# Patient Record
Sex: FEMALE | Race: WHITE | Employment: UNEMPLOYED | URBAN - NONMETROPOLITAN AREA
[De-identification: names, ages, dates, MRNs, and addresses within clinical notes are randomized per-mention and may not be internally consistent; named-entity substitution may affect disease eponyms.]

---

## 2021-12-07 ENCOUNTER — OFFICE VISIT (OUTPATIENT)
Dept: FAMILY MEDICINE CLINIC | Age: 61
End: 2021-12-07
Payer: COMMERCIAL

## 2021-12-07 VITALS
HEART RATE: 66 BPM | HEIGHT: 64 IN | DIASTOLIC BLOOD PRESSURE: 96 MMHG | SYSTOLIC BLOOD PRESSURE: 154 MMHG | OXYGEN SATURATION: 97 % | BODY MASS INDEX: 24.07 KG/M2 | TEMPERATURE: 98.1 F | WEIGHT: 141 LBS

## 2021-12-07 DIAGNOSIS — Z76.89 ENCOUNTER TO ESTABLISH CARE WITH NEW DOCTOR: ICD-10-CM

## 2021-12-07 DIAGNOSIS — R06.2 NOCTURNAL COUGH WITH WHEEZE: ICD-10-CM

## 2021-12-07 DIAGNOSIS — M19.90 ARTHRITIS: ICD-10-CM

## 2021-12-07 DIAGNOSIS — R05.8 NOCTURNAL COUGH WITH WHEEZE: ICD-10-CM

## 2021-12-07 DIAGNOSIS — I10 ESSENTIAL HYPERTENSION: Primary | ICD-10-CM

## 2021-12-07 PROCEDURE — 99204 OFFICE O/P NEW MOD 45 MIN: CPT | Performed by: STUDENT IN AN ORGANIZED HEALTH CARE EDUCATION/TRAINING PROGRAM

## 2021-12-07 RX ORDER — LISINOPRIL 10 MG/1
10 TABLET ORAL DAILY
Qty: 30 TABLET | Refills: 0 | Status: SHIPPED | OUTPATIENT
Start: 2021-12-07 | End: 2021-12-14 | Stop reason: SDUPTHER

## 2021-12-07 RX ORDER — ALBUTEROL SULFATE 90 UG/1
2 AEROSOL, METERED RESPIRATORY (INHALATION) 4 TIMES DAILY PRN
Qty: 18 G | Refills: 0 | Status: SHIPPED | OUTPATIENT
Start: 2021-12-07 | End: 2022-01-31 | Stop reason: SDUPTHER

## 2021-12-07 RX ORDER — MELOXICAM 15 MG/1
15 TABLET ORAL DAILY
Qty: 30 TABLET | Refills: 5 | Status: SHIPPED | OUTPATIENT
Start: 2021-12-07 | End: 2022-01-17

## 2021-12-07 RX ORDER — MELOXICAM 15 MG/1
15 TABLET ORAL DAILY
COMMUNITY
Start: 2021-11-30 | End: 2021-12-07 | Stop reason: SDUPTHER

## 2021-12-07 SDOH — ECONOMIC STABILITY: FOOD INSECURITY: WITHIN THE PAST 12 MONTHS, THE FOOD YOU BOUGHT JUST DIDN'T LAST AND YOU DIDN'T HAVE MONEY TO GET MORE.: NEVER TRUE

## 2021-12-07 SDOH — ECONOMIC STABILITY: FOOD INSECURITY: WITHIN THE PAST 12 MONTHS, YOU WORRIED THAT YOUR FOOD WOULD RUN OUT BEFORE YOU GOT MONEY TO BUY MORE.: NEVER TRUE

## 2021-12-07 ASSESSMENT — ENCOUNTER SYMPTOMS
ABDOMINAL PAIN: 0
SINUS PRESSURE: 0
SHORTNESS OF BREATH: 0
SORE THROAT: 0
COUGH: 0
VOMITING: 0

## 2021-12-07 ASSESSMENT — PATIENT HEALTH QUESTIONNAIRE - PHQ9
SUM OF ALL RESPONSES TO PHQ QUESTIONS 1-9: 0
2. FEELING DOWN, DEPRESSED OR HOPELESS: 0
SUM OF ALL RESPONSES TO PHQ QUESTIONS 1-9: 0
SUM OF ALL RESPONSES TO PHQ QUESTIONS 1-9: 0
SUM OF ALL RESPONSES TO PHQ9 QUESTIONS 1 & 2: 0
1. LITTLE INTEREST OR PLEASURE IN DOING THINGS: 0

## 2021-12-07 ASSESSMENT — SOCIAL DETERMINANTS OF HEALTH (SDOH): HOW HARD IS IT FOR YOU TO PAY FOR THE VERY BASICS LIKE FOOD, HOUSING, MEDICAL CARE, AND HEATING?: NOT HARD AT ALL

## 2021-12-07 NOTE — PATIENT INSTRUCTIONS
Your goal blood pressure is 135/85 or below. Symptoms of low blood pressure include  lightheadedness, weakness, pale color, and nausea. If these occur you should have your blood pressure checked as soon as possible, either using a home blood pressure cuff or have someone bring you to the office to have your blood pressure checked. Weight loss and diet control are an important part of managing high blood pressure. Diet aiming at lowering sodium and cholesterol are very important contributors to blood pressure management. Good blood pressure control is important as it prevent heart attacks and stroke. High Blood Pressure: Care Instructions  Overview     It's normal for blood pressure to go up and down throughout the day. But if it stays up, you have high blood pressure. Another name for high blood pressure is hypertension. Despite what a lot of people think, high blood pressure usually doesn't cause headaches or make you feel dizzy or lightheaded. It usually has no symptoms. But it does increase your risk of stroke, heart attack, and other problems. You and your doctor will talk about your risks of these problems based on your blood pressure. Your doctor will give you a goal for your blood pressure. Your goal will be based on your health and your age. Lifestyle changes, such as eating healthy and being active, are always important to help lower blood pressure. You might also take medicine to reach your blood pressure goal.  Follow-up care is a key part of your treatment and safety. Be sure to make and go to all appointments, and call your doctor if you are having problems. It's also a good idea to know your test results and keep a list of the medicines you take. How can you care for yourself at home? Medical treatment  · If you stop taking your medicine, your blood pressure will go back up. You may take one or more types of medicine to lower your blood pressure. Be safe with medicines.  Take your medicine exactly as prescribed. Call your doctor if you think you are having a problem with your medicine. · Talk to your doctor before you start taking aspirin every day. Aspirin can help certain people lower their risk of a heart attack or stroke. But taking aspirin isn't right for everyone, because it can cause serious bleeding. · See your doctor regularly. You may need to see the doctor more often at first or until your blood pressure comes down. · If you are taking blood pressure medicine, talk to your doctor before you take decongestants or anti-inflammatory medicine, such as ibuprofen. Some of these medicines can raise blood pressure. · Learn how to check your blood pressure at home. Lifestyle changes  · Stay at a healthy weight. This is especially important if you put on weight around the waist. Losing even 10 pounds can help you lower your blood pressure. · If your doctor recommends it, get more exercise. Walking is a good choice. Bit by bit, increase the amount you walk every day. Try for at least 30 minutes on most days of the week. You also may want to swim, bike, or do other activities. · Avoid or limit alcohol. Talk to your doctor about whether you can drink any alcohol. · Try to limit how much sodium you eat to less than 2,300 milligrams (mg) a day. Your doctor may ask you to try to eat less than 1,500 mg a day. · Eat plenty of fruits (such as bananas and oranges), vegetables, legumes, whole grains, and low-fat dairy products. · Lower the amount of saturated fat in your diet. Saturated fat is found in animal products such as milk, cheese, and meat. Limiting these foods may help you lose weight and also lower your risk for heart disease. · Do not smoke. Smoking increases your risk for heart attack and stroke. If you need help quitting, talk to your doctor about stop-smoking programs and medicines. These can increase your chances of quitting for good.     When should you call for help?   Call  911 anytime you think you may need emergency care. This may mean having symptoms that suggest that your blood pressure is causing a serious heart or blood vessel problem. Your blood pressure may be over 180/120. For example, call 911 if:  · You have symptoms of a heart attack. These may include:  ? Chest pain or pressure, or a strange feeling in the chest.  ? Sweating. ? Shortness of breath. ? Nausea or vomiting. ? Pain, pressure, or a strange feeling in the back, neck, jaw, or upper belly or in one or both shoulders or arms. ? Lightheadedness or sudden weakness. ? A fast or irregular heartbeat. · You have symptoms of a stroke. These may include:  ? Sudden numbness, tingling, weakness, or loss of movement in your face, arm, or leg, especially on only one side of your body. ? Sudden vision changes. ? Sudden trouble speaking. ? Sudden confusion or trouble understanding simple statements. ? Sudden problems with walking or balance. ? A sudden, severe headache that is different from past headaches. · You have severe back or belly pain. Do not wait until your blood pressure comes down on its own. Get help right away. Call your doctor now or seek immediate care if:  · Your blood pressure is much higher than normal (such as 180/120 or higher), but you don't have symptoms. · You think high blood pressure is causing symptoms, such as:  ? Severe headache.  ? Blurry vision. Watch closely for changes in your health, and be sure to contact your doctor if:  · Your blood pressure measures higher than your doctor recommends at least 2 times. That means the top number is higher or the bottom number is higher, or both. · You think you may be having side effects from your blood pressure medicine. Where can you learn more? Go to https://pratik.[employer]. org and sign in to your [employer] account.  Enter O888 in the [employer] box to learn more about \"High Blood Pressure: Care Instructions. \"     If you do not have an account, please click on the \"Sign Up Now\" link. Current as of: December 16, 2019               Content Version: 12.5  © 2080-4102 Healthwise, Incorporated. Care instructions adapted under license by [facility] ([facility]). If you have questions about a medical condition or this instruction, always ask your healthcare professional. [address] any warranty or liability for your use of this information. DASH Diet: Care Instructions  Your Care Instructions     The DASH diet is an eating plan that can help lower your blood pressure. DASH stands for Dietary Approaches to Stop Hypertension. Hypertension is high blood pressure. The DASH diet focuses on eating foods that are high in calcium, potassium, and magnesium. These nutrients can lower blood pressure. The foods that are highest in these nutrients are fruits, vegetables, low-fat dairy products, nuts, seeds, and legumes. But taking calcium, potassium, and magnesium supplements instead of eating foods that are high in those nutrients does not have the same effect. The DASH diet also includes whole grains, fish, and poultry. The DASH diet is one of several lifestyle changes your doctor may recommend to lower your high blood pressure. Your doctor may also want you to decrease the amount of sodium in your diet. Lowering sodium while following the DASH diet can lower blood pressure even further than just the DASH diet alone. Follow-up care is a key part of your treatment and safety. Be sure to make and go to all appointments, and call your doctor if you are having problems. It's also a good idea to know your test results and keep a list of the medicines you take. How can you care for yourself at home? Following the DASH diet  · Eat 4 to 5 servings of fruit each day. A serving is 1 medium-sized piece of fruit, ½ cup chopped or canned fruit, 1/4 cup dried fruit, or 4 ounces (½ cup) of fruit juice. Choose fruit more often than fruit juice. · Eat 4 to 5 servings of vegetables each day. A serving is 1 cup of lettuce or raw leafy vegetables, ½ cup of chopped or cooked vegetables, or 4 ounces (½ cup) of vegetable juice. Choose vegetables more often than vegetable juice. · Get 2 to 3 servings of low-fat and fat-free dairy each day. A serving is 8 ounces of milk, 1 cup of yogurt, or 1 ½ ounces of cheese. · Eat 6 to 8 servings of grains each day. A serving is 1 slice of bread, 1 ounce of dry cereal, or ½ cup of cooked rice, pasta, or cooked cereal. Try to choose whole-grain products as much as possible. · Limit lean meat, poultry, and fish to 2 servings each day. A serving is 3 ounces, about the size of a deck of cards. · Eat 4 to 5 servings of nuts, seeds, and legumes (cooked dried beans, lentils, and split peas) each week. A serving is 1/3 cup of nuts, 2 tablespoons of seeds, or ½ cup of cooked beans or peas. · Limit fats and oils to 2 to 3 servings each day. A serving is 1 teaspoon of vegetable oil or 2 tablespoons of salad dressing. · Limit sweets and added sugars to 5 servings or less a week. A serving is 1 tablespoon jelly or jam, ½ cup sorbet, or 1 cup of lemonade. · Eat less than 2,300 milligrams (mg) of sodium a day. If you limit your sodium to 1,500 mg a day, you can lower your blood pressure even more. Tips for success  · Start small. Do not try to make dramatic changes to your diet all at once. You might feel that you are missing out on your favorite foods and then be more likely to not follow the plan. Make small changes, and stick with them. Once those changes become habit, add a few more changes. · Try some of the following:  ? Make it a goal to eat a fruit or vegetable at every meal and at snacks. This will make it easy to get the recommended amount of fruits and vegetables each day. ? Try yogurt topped with fruit and nuts for a snack or healthy dessert.   ? Add lettuce, tomato, cucumber, and onion to sandwiches. ? Combine a ready-made pizza crust with low-fat mozzarella cheese and lots of vegetable toppings. Try using tomatoes, squash, spinach, broccoli, carrots, cauliflower, and onions. ? Have a variety of cut-up vegetables with a low-fat dip as an appetizer instead of chips and dip. ? Sprinkle sunflower seeds or chopped almonds over salads. Or try adding chopped walnuts or almonds to cooked vegetables. ? Try some vegetarian meals using beans and peas. Add garbanzo or kidney beans to salads. Make burritos and tacos with mashed matias beans or black beans. Where can you learn more? Go to https://[employer] Financialjohnnaeb.[facility]. org and sign in to your [employer] account. Enter I340 in the [employer] box to learn more about \"DASH Diet: Care Instructions. \"     If you do not have an account, please click on the \"Sign Up Now\" link. Current as of: December 16, 2019               Content Version: 12.5  © 9700-3032 Healthwise, Incorporated. Care instructions adapted under license by [facility] ([facility]). If you have questions about a medical condition or this instruction, always ask your healthcare professional. [address] any warranty or liability for your use of this information.

## 2021-12-07 NOTE — PROGRESS NOTES
2021    Brdigett Oseguera (:  1960) is a 63 y.o. female, here for evaluation of the following medical concerns:  Chief Complaint   Patient presents with   [address] [place] [address]     Declined mammogram and colonoscopy.  Breathing Problem     Would like inhaler. HPI  Establishing care  Last PCP was several years ago  Past medical history: osteoarthritis-hands knees    OA  Started on Meloxicam 3 months ago  Feels like this has helped her pain a little bit  Pain located in her hands and knees  Previously had steroid injection but had severe migraine for 2 weeks following injection  Was told by Ortho they could do a fusion for her but patient declined    Breathing problems  Exposed to cleaning chemicals 7 years ago  Has had off and on breathing issues since then  Worse in the winter time and with chemical exposure  Endorses cough, wheezing  No prior history of asthma or COPD  Has been given albuterol inhaler in the past which improves her symptoms  Has never had PFTs    Elevated BP reading  No prior history of eHTN  BP has been slowly increasing over time  BP was in the 140's at last provider and she was focusing on diet and exercise  Denied CP, SOB, headaches, vision changes    Health maintenance  Breast cancer: Declined mammogram  Colon cancer: Declined screening  Screening lab work: Patient declined at this time    Review of Systems   Constitutional: Negative for chills and fever. HENT: Negative for congestion, sinus pressure and sore throat. Respiratory: Negative for cough and shortness of breath. Cardiovascular: Negative for chest pain and palpitations. Gastrointestinal: Negative for abdominal pain and vomiting. Musculoskeletal: Positive for arthralgias. Negative for myalgias. Skin: Negative for rash and wound. Neurological: Negative for speech difficulty and light-headedness. Psychiatric/Behavioral: Negative for suicidal ideas.  The patient is not nervous/anxious. Prior to Visit Medications    Medication Sig Taking? Authorizing Provider   meloxicam (MOBIC) 15 MG tablet Take 1 tablet by mouth daily Yes Jayce Baugh DO   albuterol sulfate HFA (VENTOLIN HFA) 108 (90 Base) MCG/ACT inhaler Inhale 2 puffs into the lungs 4 times daily as needed for Wheezing Yes Jayce Baugh DO   lisinopril (PRINIVIL;ZESTRIL) 10 MG tablet Take 1 tablet by mouth daily Yes Jayce Baugh DO        Medications Discontinued During This Encounter   Medication Reason    meloxicam (MOBIC) 15 MG tablet REORDER       Allergies   Allergen Reactions    Penicillins Itching and Rash       Past Medical History:   Diagnosis Date    Arthritis of both knees     Arthritis of right hand        Past Surgical History:   Procedure Laterality Date    BREAST ENHANCEMENT SURGERY       SECTION      PARTIAL HYSTERECTOMY      TONSILLECTOMY      WISDOM TOOTH EXTRACTION         Social History     Socioeconomic History    Marital status: Unknown     Spouse name: Not on file    Number of children: Not on file    Years of education: Not on file    Highest education level: Not on file   Occupational History    Not on file   Tobacco Use    Smoking status: Never Smoker    Smokeless tobacco: Never Used   Substance and Sexual Activity    Alcohol use: Not on file    Drug use: Not on file    Sexual activity: Not on file   Other Topics Concern    Not on file   Social History Narrative    Not on file     Social Determinants of Health     Financial Resource Strain: Low Risk     Difficulty of Paying Living Expenses: Not hard at all   Food Insecurity: No Food Insecurity    Worried About Running Out of Food in the Last Year: Never true    [place] of Food in the Last Year: Never true   Transportation Needs:     Lack of Transportation (Medical): Not on file    Lack of Transportation (Non-Medical):  Not on file   Physical Activity:     Days of Exercise per Week: Not on file    Minutes of Exercise per Session: Not on file   Stress:     Feeling of Stress : Not on file   Social Connections:     Frequency of Communication with Friends and Family: Not on file    Frequency of Social Gatherings with Friends and Family: Not on file    Attends Evangelical Services: Not on file    Active Member of [address] [place] [address] [employer] or Organizations: Not on file    Attends Club or Organization Meetings: Not on file    Marital Status: Not on file   Intimate Partner Violence:     Fear of Current or Ex-Partner: Not on file    Emotionally Abused: Not on file    Physically Abused: Not on file    Sexually Abused: Not on file   Housing Stability:     Unable to Pay for Housing in the Last Year: Not on file    Number of [place] in the Last Year: Not on file    Unstable Housing in the Last Year: Not on file        Family History   Problem Relation Age of Onset    Cancer Mother         lung    High Blood Pressure Mother     High Blood Pressure Father     Diabetes Neg Hx     Kidney Disease Neg Hx     Stroke Neg Hx        Vitals:    12/07/21 1239 12/07/21 1251   BP: (!) 162/94 (!) 154/96   Site: Left Upper Arm Right Upper Arm   Position: Sitting Sitting   Cuff Size: Medium Adult Medium Adult   Pulse: 66    Temp: 98.1 °F (36.7 °C)    SpO2: 97%    Weight: 141 lb (64 kg)    Height: 5' 4\" (1.626 m)        Estimated body mass index is 24.2 kg/m² as calculated from the following:    Height as of this encounter: 5' 4\" (1.626 m). Weight as of this encounter: 141 lb (64 kg). No results for input(s): WBC, RBC, HGB, HCT, MCV, MCH, MCHC, RDW, PLT, MPV in the last 72 hours. No results for input(s): NA, K, CL, CO2, BUN, CREATININE, GLUCOSE, CALCIUM, PROT, LABALBU, BILITOT, ALKPHOS, AST, ALT in the last 72 hours. No results found for: LABA1C    No results found. Physical Exam  Constitutional:       Appearance: Normal appearance. She is normal weight. HENT:      Head: Normocephalic and atraumatic.    Eyes: Extraocular Movements: Extraocular movements intact. Conjunctiva/sclera: Conjunctivae normal.   Cardiovascular:      Rate and Rhythm: Normal rate and regular rhythm. Pulses: Normal pulses. Heart sounds: Normal heart sounds. Pulmonary:      Effort: Pulmonary effort is normal.      Breath sounds: Normal breath sounds. No wheezing or rales. Skin:     General: Skin is warm. Capillary Refill: Capillary refill takes less than 2 seconds. Findings: No rash. Neurological:      Mental Status: She is alert. Psychiatric:         Mood and Affect: Mood normal.         Thought Content: Thought content normal.         Judgment: Judgment normal.         ASSESSMENT/PLAN:  1. Essential hypertension  -Blood pressure is elevated on exam today and per patient elevated on prior visits with her last PCP several years ago  -We will start lisinopril at this time and recheck in 2 weeks at follow-up appointment  -Patient declined screening lab work  -Discussed low-sodium diet and exercise 150 minutes/week    - lisinopril (PRINIVIL;ZESTRIL) 10 MG tablet; Take 1 tablet by mouth daily  Dispense: 30 tablet; Refill: 0    2. Arthritis  -Continue meloxicam  -Patient has [facility] [address] in the past  -Had negative reaction to corticosteroid joint injection  -Was told by Ortho that next option was fusion however patient does not want surgery at this time    - meloxicam (MOBIC) 15 MG tablet; Take 1 tablet by mouth daily  Dispense: 30 tablet; Refill: 5    3. Nocturnal cough with wheeze  -We will get PFTs to rule out underlying asthma or COPD  -Albuterol for symptomatic treatment    - albuterol sulfate HFA (VENTOLIN HFA) 108 (90 Base) MCG/ACT inhaler; Inhale 2 puffs into the lungs 4 times daily as needed for Wheezing  Dispense: 18 g; Refill: 0  - Full PFT Study With Bronchodilator; Future    4.  Encounter to establish care with new doctor  -Declined mammogram, Colonoscopy and screening labs      Medications Discontinued During This Encounter   Medication Reason    meloxicam (MOBIC) 15 MG tablet REORDER       ---------------------------------------------------------------------  Side effects, adverse effects of the medication prescribed today, as well as treatment plan/ rationale and result expectations have been discussed with the patient who expresses understanding and desires to proceed. Close follow up to evaluate treatment results and for coordination of care. I have reviewed the patient's medical history in detail and updated the computerized patient record. As always, patient is advised that if symptoms worsen in any way they will proceed to the nearest emergency room. --------------------------------------------------------------------    Return in about 2 weeks (around 12/21/2021) for f/u BP, new med. An  electronic signature was used to authenticate this note.     --Que Dixon DO on 12/7/2021 at 1:18 PM

## 2021-12-09 ENCOUNTER — TELEPHONE (OUTPATIENT)
Dept: FAMILY MEDICINE CLINIC | Age: 61
End: 2021-12-09

## 2021-12-09 NOTE — TELEPHONE ENCOUNTER
Pt calling because she was charged $ 25 instead of $ 5 on 12/7/21 appt with Dr Wilmer Ibarra.      Pt # [phone]

## 2021-12-14 ENCOUNTER — TELEPHONE (OUTPATIENT)
Dept: FAMILY MEDICINE CLINIC | Age: 61
End: 2021-12-14

## 2021-12-14 ENCOUNTER — VIRTUAL VISIT (OUTPATIENT)
Dept: FAMILY MEDICINE CLINIC | Age: 61
End: 2021-12-14
Payer: COMMERCIAL

## 2021-12-14 DIAGNOSIS — T88.7XXA MEDICATION SIDE EFFECT: Primary | ICD-10-CM

## 2021-12-14 DIAGNOSIS — I10 ESSENTIAL HYPERTENSION: ICD-10-CM

## 2021-12-14 PROCEDURE — 99213 OFFICE O/P EST LOW 20 MIN: CPT

## 2021-12-14 RX ORDER — LISINOPRIL 10 MG/1
10 TABLET ORAL DAILY
Qty: 30 TABLET | Refills: 0 | Status: SHIPPED | OUTPATIENT
Start: 2021-12-14 | End: 2022-01-03 | Stop reason: SINTOL

## 2021-12-14 ASSESSMENT — ENCOUNTER SYMPTOMS
CHEST TIGHTNESS: 0
TROUBLE SWALLOWING: 0
SINUS PAIN: 0
APNEA: 0
ABDOMINAL PAIN: 0
COLOR CHANGE: 0
EYE DISCHARGE: 0
SINUS PRESSURE: 0
DIARRHEA: 0
WHEEZING: 0
FACIAL SWELLING: 0
NAUSEA: 0
EYE ITCHING: 0
VOMITING: 0
SHORTNESS OF BREATH: 0
RHINORRHEA: 0
COUGH: 0
SORE THROAT: 0
EYE PAIN: 0
BACK PAIN: 0

## 2021-12-14 NOTE — PATIENT INSTRUCTIONS
Patient Education        Learning About ACE Inhibitors  What are ACE inhibitors? ACE (angiotensin-converting enzyme) inhibitors are medicines that lower blood pressure. They stop the release of an enzyme. This enzyme makes your blood vessels smaller. Without it, your blood vessels relax and get bigger. This lowers your blood pressure. These medicines also increase how much water and salt go into your urine. This also lowers blood pressure. You may take this kind of medicine if you have high blood pressure. Or you may take it if you have heart problems, kidney problems, or diabetes. If you have coronary artery disease, this medicine can help prevent heart attacks and strokes. Examples  · benazepril (Lotensin)  · enalapril (Vasotec)  · lisinopril (Prinivil, Zestril)  · ramipril (Altace)  This is not a complete list.  Possible side effects  Side effects may include:  · A cough. · Low blood pressure. This can make you feel dizzy or weak. · Too much potassium in your body. · Swelling of your lips, tongue, or face. If the swelling is severe, you may need treatment right away. Severe swelling can make it hard to breathe, but this is rare. You may have other side effects or reactions not listed here. Check the information that comes with your medicine. What to know about taking this medicine  · ACE inhibitors can cause a dry cough. Talk to your doctor if you have a dry cough. You may need a different medicine. · These medicines can cause an allergic reaction. This can cause a little swelling. Or it can cause red bumps on your skin that hurt. In rare cases, the swelling may make it hard for you to breathe. · Do not take this medicine if you are pregnant or plan to become pregnant. · Take your medicines exactly as prescribed. Call your doctor if you think you are having a problem with your medicine. · Check with your doctor or pharmacist before you use any other medicines.  This includes over-the-counter

## 2021-12-14 NOTE — TELEPHONE ENCOUNTER
Called pt to reschedule her 12/23 OV appt for January. She is concerned that her lisinopril will run out before then. She feels like she is having some sinus issues. Pt wants to know if you could send her in a z joanne?

## 2021-12-14 NOTE — PROGRESS NOTES
[address] Encounter  CHIEF COMPLAINT       Chief Complaint   Patient presents with    Sinusitis     evening times, dry cough, lots of pressure around nose, forehead lots of pressure. needs a zpack.  Other     temp 98.4    pulse 79 ox 95   bp 140/100 bp 140/96       HISTORY OF PRESENT ILLNESS   Twin Prescott is a 63 y.o. female who presents with:  HPI  Reporting symptoms of sinus congestion and dizziness she reports the symptoms have been fluctuating on and off. Worse in the evenings since she started taking lisinopril a few weeks ago. REVIEW OF SYSTEMS     Review of Systems   Constitutional: Negative for appetite change, chills, diaphoresis, fatigue and fever. HENT: Positive for congestion. Negative for ear discharge, ear pain, facial swelling, hearing loss, mouth sores, postnasal drip, rhinorrhea, sinus pressure, sinus pain, sore throat and trouble swallowing. Eyes: Negative for pain, discharge and itching. Respiratory: Negative for apnea, cough, chest tightness, shortness of breath and wheezing. Cardiovascular: Negative for chest pain and palpitations. Gastrointestinal: Negative for abdominal pain, diarrhea, nausea and vomiting. Endocrine: Negative for cold intolerance and heat intolerance. Genitourinary: Negative for decreased urine volume and difficulty urinating. Musculoskeletal: Negative for arthralgias, back pain and myalgias. Skin: Negative for color change, pallor and rash. Neurological: Positive for dizziness. Negative for syncope, weakness, light-headedness and headaches. Hematological: Negative for adenopathy. Psychiatric/Behavioral: Negative for behavioral problems, confusion and sleep disturbance.      PAST MEDICAL HISTORY         Diagnosis Date    Arthritis of both knees     Arthritis of right hand      SURGICAL HISTORY     Patient  has a past surgical history that includes partial hysterectomy (cervix not removed);  section; Breast enhancement surgery; Young tooth extraction; and Tonsillectomy. CURRENT MEDICATIONS       Previous Medications    ALBUTEROL SULFATE HFA (VENTOLIN HFA) 108 (90 BASE) MCG/ACT INHALER    Inhale 2 puffs into the lungs 4 times daily as needed for Wheezing    LISINOPRIL (PRINIVIL;ZESTRIL) 10 MG TABLET    Take 1 tablet by mouth daily    MELOXICAM (MOBIC) 15 MG TABLET    Take 1 tablet by mouth daily     ALLERGIES     Patient is is allergic to penicillins. FAMILY HISTORY     Patient'sfamily history includes Cancer in her mother; High Blood Pressure in her father and mother. HISTORY     Patient  reports that she has never smoked. She has never used smokeless tobacco.  PHYSICAL EXAM     VITALS   ,  ,  ,  ,    Physical Exam  Constitutional:       General: She is not in acute distress. Appearance: Normal appearance. She is not ill-appearing, toxic-appearing or diaphoretic. HENT:      Head: Normocephalic. Right Ear: Tympanic membrane, ear canal and external ear normal. No middle ear effusion. There is no impacted cerumen. No mastoid tenderness. Tympanic membrane is not perforated, erythematous or bulging. Left Ear: Tympanic membrane, ear canal and external ear normal.  No middle ear effusion. There is no impacted cerumen. No mastoid tenderness. Tympanic membrane is not perforated, erythematous or bulging. Nose: No congestion or rhinorrhea. Mouth/Throat:      Mouth: Mucous membranes are moist.      Pharynx: Oropharynx is clear. No pharyngeal swelling, oropharyngeal exudate or posterior oropharyngeal erythema. Tonsils: No tonsillar exudate or tonsillar abscesses. 0 on the right. 0 on the left. Eyes:      General:         Right eye: No discharge. Left eye: No discharge. Cardiovascular:      Rate and Rhythm: Normal rate and regular rhythm. Pulses: Normal pulses. Heart sounds: Normal heart sounds. No murmur heard. No gallop.     Pulmonary:      Effort: Pulmonary effort is normal. No respiratory distress. Breath sounds: Normal breath sounds. No stridor. No wheezing, rhonchi or rales. Chest:      Chest wall: No tenderness. Abdominal:      General: Abdomen is flat. There is no distension. Palpations: Abdomen is soft. Tenderness: There is no abdominal tenderness. Musculoskeletal:         General: Normal range of motion. Cervical back: No rigidity or tenderness. Lymphadenopathy:      Cervical: No cervical adenopathy. Skin:     General: Skin is warm and dry. Capillary Refill: Capillary refill takes less than 2 seconds. Coloration: Skin is not pale. Neurological:      General: No focal deficit present. Mental Status: She is alert and oriented to person, place, and time. Mental status is at baseline. Psychiatric:         Mood and Affect: Mood normal.         Behavior: Behavior normal.       READY CARE COURSE   No orders of the defined types were placed in this encounter. Labs:  No results found for this visit on 12/14/21. IMAGING:  No orders to display     Scheduled Meds:  Continuous Infusions:  PRN Meds:. PROCEDURES:  FINAL IMPRESSION    No diagnosis found. DISPOSITION/PLAN     HISTORY OF PRESENT ILLNESS   Priscilla Dia is a 63 y.o. female who presents with ymptoms of sinus congestion and dizziness she reports the symptoms have been fluctuating on and off. Worse in the evenings since she started taking lisinopril a few weeks ago. Pt is afebrile has nontoxic appearance and VS are stable. On exam ears appear bilaterally normal, no bulging of the TM no erythremia. Pharynx is pink and moist, no tonsillar enlargement. Neck is supple, no masses. Lung sounds are clear throughout. Heart sounds normal and regular. No edema noted in the lower extremities. Patient's blood pressure is mildly elevated today. Though consistent or slightly improved from previous visit.   There is low concern for an acute illness at this time advised patient to obtain blood pressure cuff and begin checking blood pressures at home she should do this every other day and record results to bring back in for PCP to review. Patient will be set up with an appointment for PCP prior to leaving today. PATIENT REFERRED TO:  Return As soon as possible, for Follow up with PCP. DISCHARGE MEDICATIONS:  New Prescriptions    No medications on file     Cannot display discharge medications since this is not an admission.        Norma Alpers, SAVANNAH - CNP

## 2021-12-14 NOTE — TELEPHONE ENCOUNTER
I did refill patient's lisinopril    I would recommend she be seen for her sinus issues, can be done virtually

## 2021-12-23 ENCOUNTER — HOSPITAL ENCOUNTER (OUTPATIENT)
Dept: PULMONOLOGY | Age: 61
Discharge: HOME | End: 2021-12-23
Payer: COMMERCIAL

## 2021-12-23 DIAGNOSIS — R06.2 NOCTURNAL COUGH WITH WHEEZE: ICD-10-CM

## 2021-12-23 DIAGNOSIS — R05.8 NOCTURNAL COUGH WITH WHEEZE: ICD-10-CM

## 2021-12-23 PROCEDURE — 94060 EVALUATION OF WHEEZING: CPT

## 2021-12-23 PROCEDURE — 94729 DIFFUSING CAPACITY: CPT

## 2021-12-23 PROCEDURE — 6360000002 HC RX W HCPCS: Performed by: STUDENT IN AN ORGANIZED HEALTH CARE EDUCATION/TRAINING PROGRAM

## 2021-12-23 PROCEDURE — 94726 PLETHYSMOGRAPHY LUNG VOLUMES: CPT

## 2021-12-23 RX ORDER — ALBUTEROL SULFATE 2.5 MG/3ML
2.5 SOLUTION RESPIRATORY (INHALATION) ONCE
Status: COMPLETED | OUTPATIENT
Start: 2021-12-23 | End: 2021-12-23

## 2021-12-23 RX ADMIN — ALBUTEROL SULFATE 2.5 MG: 2.5 SOLUTION RESPIRATORY (INHALATION) at 09:25

## 2021-12-27 NOTE — PROCEDURES
[address]                      [address], [address] [facility]                               PULMONARY FUNCTION    PATIENT NAME: Romulo Babb                    :        1960  MED REC NO:   [id number]                            ROOM:  ACCOUNT NO:   [de-identified]                           ADMIT DATE: 2021  PROVIDER:     Neela Cartwright MD    DATE OF PROCEDURE:  2021    REASON FOR STUDY:  Shortness of breath, cough, and wheezing. INTERPRETATION:  FVC is 1.91, 57% of predicted; FEV1 is 1.41, 54% of  predicted; FEV1/FVC is 73%; and FEF is 25-75% is 1.09, 45% of predicted. Postbronchodilator study shows FVC is 2.18, 14% improvement and FEV1 is  1.60, 13% improvement. Lung volume study shows residual volume is 2.56,  129% of predicted; TLC is 4.32, 85% of predicted; RV to TLC ratio is  59.11, 151% of predicted. Diffusion capacity is 19.65, 90% of  predicted. Airway resistance is 4.23, 227% of predicted. SUMMARY:  Moderately severe obstructive pulmonary disease. There is  significant response to bronchodilator. Static lung volume study  suggests significant air trapping and hyperinflation. Diffusion  capacity within normal range. Airway resistance is increased. Clinical  correlation is requested.         Andrew Clayton MD    D: 2021 9:06:38       T: 2021 10:41:46     [email or web address]  Job#: [id number]     Doc#: [id number]    CC:

## 2021-12-28 PROCEDURE — 94729 DIFFUSING CAPACITY: CPT | Performed by: INTERNAL MEDICINE

## 2021-12-28 PROCEDURE — 94726 PLETHYSMOGRAPHY LUNG VOLUMES: CPT | Performed by: INTERNAL MEDICINE

## 2021-12-28 PROCEDURE — 94060 EVALUATION OF WHEEZING: CPT | Performed by: INTERNAL MEDICINE

## 2022-01-03 ENCOUNTER — OFFICE VISIT (OUTPATIENT)
Dept: FAMILY MEDICINE CLINIC | Age: 62
End: 2022-01-03
Payer: COMMERCIAL

## 2022-01-03 VITALS
HEART RATE: 88 BPM | SYSTOLIC BLOOD PRESSURE: 144 MMHG | OXYGEN SATURATION: 98 % | BODY MASS INDEX: 23.56 KG/M2 | TEMPERATURE: 98.2 F | HEIGHT: 64 IN | DIASTOLIC BLOOD PRESSURE: 90 MMHG | WEIGHT: 138 LBS

## 2022-01-03 DIAGNOSIS — I10 ESSENTIAL HYPERTENSION: ICD-10-CM

## 2022-01-03 DIAGNOSIS — M19.90 ARTHRITIS: ICD-10-CM

## 2022-01-03 DIAGNOSIS — T88.7XXA MEDICATION SIDE EFFECT: ICD-10-CM

## 2022-01-03 DIAGNOSIS — J45.40 MODERATE PERSISTENT ASTHMA WITHOUT COMPLICATION: Primary | ICD-10-CM

## 2022-01-03 PROCEDURE — 99214 OFFICE O/P EST MOD 30 MIN: CPT | Performed by: STUDENT IN AN ORGANIZED HEALTH CARE EDUCATION/TRAINING PROGRAM

## 2022-01-03 RX ORDER — LOSARTAN POTASSIUM 50 MG/1
50 TABLET ORAL DAILY
Qty: 14 TABLET | Refills: 0 | Status: CANCELLED | OUTPATIENT
Start: 2022-01-03 | End: 2022-01-17

## 2022-01-03 RX ORDER — TELMISARTAN 40 MG/1
40 TABLET ORAL DAILY
Qty: 30 TABLET | Refills: 0 | Status: SHIPPED | OUTPATIENT
Start: 2022-01-03 | End: 2022-01-31 | Stop reason: SDUPTHER

## 2022-01-03 RX ORDER — AMLODIPINE BESYLATE 5 MG/1
5 TABLET ORAL DAILY
Qty: 30 TABLET | Refills: 0 | Status: CANCELLED | OUTPATIENT
Start: 2022-01-03

## 2022-01-03 ASSESSMENT — ENCOUNTER SYMPTOMS
SHORTNESS OF BREATH: 0
VOMITING: 0
ABDOMINAL PAIN: 0
WHEEZING: 1
COUGH: 1
SINUS PRESSURE: 0
SORE THROAT: 0

## 2022-01-03 NOTE — PATIENT INSTRUCTIONS
Patient Education        Learning About ARBs  Introduction     ARBs (angiotensin II receptor blockers) block a hormone that makes blood vessels narrow. As a result, the blood vessels relax and widen. This lowers blood pressure. ARBs also put more water and salt into the urine. This also lowers blood pressure. ARBs can treat:  · High blood pressure. · Coronary artery disease. · Heart failure. They also may be used to help your kidneys when you have diabetes. Examples  · candesartan (Atacand)  · irbesartan (Avapro)  · losartan (Cozaar)  · olmesartan (Benicar)  · valsartan (Diovan)  This is not a complete list of all ARBs. Possible side effects  Side effects may include:  · Low blood pressure. You may feel dizzy and weak. · High potassium levels. You may have other side effects or reactions not listed here. Check the information that comes with your medicine. What to know about taking this medicine  · ARBs may be used if you had a cough when you tried to take an ACE inhibitor. ARBs are less likely to cause a cough. · You may need regular blood tests. · Take your medicines exactly as prescribed. Call your doctor if you think you are having a problem with your medicine. · Tell your doctor or pharmacist all the medicines you take. This includes over-the-counter medicines, vitamins, herbal products, and supplements. Taking some medicines together can cause problems. · You should not take ARBs if you are pregnant or planning to become pregnant. Where can you learn more? Go to https://[email or web address]-dispo.com.Ma[employer]. org and sign in to your [facility] account. Enter K212 in the [facility] box to learn more about \"Learning About ARBs. \"     If you do not have an account, please click on the \"Sign Up Now\" link. Current as of: April 29, 2021               Content Version: 13.1  © 6009-6027 Healthwise, Incorporated. Care instructions adapted under license by [facility] ([facility]).  If you have questions about a medical condition or this instruction, always ask your healthcare professional. [place] [address] any warranty or liability for your use of this information.

## 2022-01-03 NOTE — RESULT ENCOUNTER NOTE
Noted, moderate to severe obstructive pulmonary disease with responds to bronchodilator - discussed with pt at apt

## 2022-01-03 NOTE — PROGRESS NOTES
1/3/2022    Treasure Rodriguez (:  1960) is a 63 y.o. female, here for evaluation of the following medical concerns:  Chief Complaint   Patient presents with    Hypertension    Bloated    Discuss Medications     Allergy to lisinopril? HPI  Past medical history: osteoarthritis-hands knees     Breathing problems  Exposed to cleaning chemicals 7 years ago  Has had off and on breathing issues since then  Worse in the winter time and with chemical exposure  Endorses cough, wheezing  No prior history of asthma or COPD  Has been given albuterol inhaler in the past which improves her symptoms    PFTs  showed moderately severe obstructive pulmonary disease. There is  significant response to bronchodilator    Elevated BP reading  No prior history of eHTN  BP has been slowly increasing over time  BP was in the 140's at last provider and she was focusing on diet and exercise  Denied CP, SOB, headaches, vision changes    Started on 10mg lisinopril   Stopped due to lightheadedness, dry cough, bloated  Would like to try Telmisartan    OA  Started on Meloxicam 3 months ago  Feels like this has helped her pain a little bit  Pain located in her hands and knees  Previously had steroid injection but had severe migraine for 2 weeks following injection  Was told by Ortho they could do a fusion for her but patient declined     Continue meloxicam daily    Health maintenance  Breast cancer: Declined mammogram - waiting until next month due to current enlarged LNs under the left arm due to recent COVID vaccine  Colon cancer: Declined screening    Review of Systems   Constitutional: Negative for chills and fever. HENT: Negative for congestion, sinus pressure and sore throat. Respiratory: Positive for cough and wheezing. Negative for shortness of breath. Cardiovascular: Negative for chest pain and palpitations. Gastrointestinal: Negative for abdominal pain and vomiting.    Musculoskeletal: Negative for arthralgias and myalgias. Skin: Negative for rash and wound. Neurological: Negative for speech difficulty and light-headedness. Psychiatric/Behavioral: Negative for suicidal ideas. The patient is not nervous/anxious. Prior to Visit Medications    Medication Sig Taking?  Authorizing Provider   budesonide (PULMICORT FLEXHALER) 180 MCG/ACT AEPB inhaler Inhale 2 puffs into the lungs 2 times daily Yes Saniya Lizzy, DO   telmisartan (MICARDIS) 40 MG tablet Take 1 tablet by mouth daily Yes Saniya Lizzy, DO   meloxicam (MOBIC) 15 MG tablet Take 1 tablet by mouth daily Yes Saniya Lizzy, DO   albuterol sulfate HFA (VENTOLIN HFA) 108 (90 Base) MCG/ACT inhaler Inhale 2 puffs into the lungs 4 times daily as needed for Wheezing Yes Saniya Ball, DO        Medications Discontinued During This Encounter   Medication Reason    lisinopril (PRINIVIL;ZESTRIL) 10 MG tablet Side effects       Allergies   Allergen Reactions    Lisinopril Other (See Comments), Shortness Of Breath and Swelling    Molds & Smuts Shortness Of Breath and Palpitations    Milk-Related Compounds Other (See Comments)    Pollen Extract Itching, Other (See Comments) and Swelling    Penicillins Itching and Rash       Past Medical History:   Diagnosis Date    Arthritis of both knees     Arthritis of right hand     Fibroadenoma of breast, right        Past Surgical History:   Procedure Laterality Date    BREAST ENHANCEMENT SURGERY      BREAST FIBROADENOMA SURGERY       SECTION      PARTIAL HYSTERECTOMY      TONSILLECTOMY      WISDOM TOOTH EXTRACTION         Social History     Socioeconomic History    Marital status:      Spouse name: Not on file    Number of children: Not on file    Years of education: Not on file    Highest education level: Not on file   Occupational History    Not on file   Tobacco Use    Smoking status: Never Smoker    Smokeless tobacco: Never Used   Substance and Sexual Activity    Alcohol use: Not on file    Drug use: Not on file    Sexual activity: Not on file   Other Topics Concern    Not on file   Social History Narrative    Not on file     Social Determinants of Health     Financial Resource Strain: Low Risk     Difficulty of Paying Living Expenses: Not hard at all   Food Insecurity: No Food Insecurity    Worried About Running Out of Food in the Last Year: Never true    [address] in the Last Year: Never true   Transportation Needs:     Lack of Transportation (Medical): Not on file    Lack of Transportation (Non-Medical):  Not on file   Physical Activity:     Days of Exercise per Week: Not on file    Minutes of Exercise per Session: Not on file   Stress:     Feeling of Stress : Not on file   Social Connections:     Frequency of Communication with Friends and Family: Not on file    Frequency of Social Gatherings with Friends and Family: Not on file    Attends Worship Services: Not on file    Active Member of [address] [place] [address] or Organizations: Not on file    Attends Club or Organization Meetings: Not on file    Marital Status: Not on file   Intimate Partner Violence:     Fear of Current or Ex-Partner: Not on file    Emotionally Abused: Not on file    Physically Abused: Not on file    Sexually Abused: Not on file   Housing Stability:     Unable to Pay for Housing in the Last Year: Not on file    Number of [place] in the Last Year: Not on file    Unstable Housing in the Last Year: Not on file        Family History   Problem Relation Age of Onset    Cancer Mother         lung    High Blood Pressure Mother     High Blood Pressure Father     Diabetes Neg Hx     Kidney Disease Neg Hx     Stroke Neg Hx        Vitals:    01/03/22 1037 01/03/22 1046   BP: (!) 136/90 (!) 144/90   Site: Right Upper Arm Left Upper Arm   Position: Sitting Sitting   Cuff Size: Medium Adult Medium Adult   Pulse: 88    Temp: 98.2 °F (36.8 °C)    SpO2: 98%    Weight: 138 lb (62.6 kg)    Height: 5' 4\" (1.626 m)        Estimated body mass index is 23.69 kg/m² as calculated from the following:    Height as of this encounter: 5' 4\" (1.626 m). Weight as of this encounter: 138 lb (62.6 kg). No results for input(s): WBC, RBC, HGB, HCT, MCV, MCH, MCHC, RDW, PLT, MPV in the last 72 hours. No results for input(s): NA, K, CL, CO2, BUN, CREATININE, GLUCOSE, CALCIUM, PROT, LABALBU, BILITOT, ALKPHOS, AST, ALT in the last 72 hours. No results found for: LABA1C    No results found. Physical Exam  Constitutional:       General: She is not in acute distress. Appearance: Normal appearance. HENT:      Head: Normocephalic and atraumatic. Eyes:      Extraocular Movements: Extraocular movements intact. Conjunctiva/sclera: Conjunctivae normal.   Musculoskeletal:         General: No deformity. Normal range of motion. Skin:     Findings: No lesion or rash. Neurological:      General: No focal deficit present. Mental Status: She is alert. Mental status is at baseline. Psychiatric:         Mood and Affect: Mood normal.         Behavior: Behavior normal.         Thought Content: Thought content normal.         ASSESSMENT/PLAN:  1. Moderate persistent asthma without complication  PFTs with moderately severe obstructive pulmonary disease with significant improvement with bronchodilator  We will start Pulmicort and continue albuterol as needed  Referral created for pulmonology    - budesonide (PULMICORT FLEXHALER) 180 MCG/ACT AEPB inhaler; Inhale 2 puffs into the lungs 2 times daily  Dispense: 1 each; Refill: Beatrice Carpio MD, Pulmonology, [facility]    2. Essential hypertension  Patient had side effects from lisinopril including abdominal bloating, lightheadedness and dry cough  She would like to try an ARB at this time specifically telmisartan as she is read online about it  Start telmisartan with follow-up in 2 weeks  Labs as ordered    - telmisartan (MICARDIS) 40 MG tablet;  Take 1 tablet by mouth daily  Dispense: 30 tablet; Refill: 0    3. Arthritis  Continue meloxicam      Medications Discontinued During This Encounter   Medication Reason    lisinopril (PRINIVIL;ZESTRIL) 10 MG tablet Side effects       ---------------------------------------------------------------------  Side effects, adverse effects of the medication prescribed today, as well as treatment plan/ rationale and result expectations have been discussed with the patient who expresses understanding and desires to proceed. Close follow up to evaluate treatment results and for coordination of care. I have reviewed the patient's medical history in detail and updated the computerized patient record. As always, patient is advised that if symptoms worsen in any way they will proceed to the nearest emergency room. --------------------------------------------------------------------    Return in about 2 weeks (around 1/17/2022) for f/u BP - 30 min. An  electronic signature was used to authenticate this note.     --Quinton Rodriguez DO on 1/4/2022 at 2:56 PM

## 2022-01-04 ENCOUNTER — TELEPHONE (OUTPATIENT)
Dept: FAMILY MEDICINE CLINIC | Age: 62
End: 2022-01-04

## 2022-01-04 NOTE — TELEPHONE ENCOUNTER
[facility], Monday-Friday.   8am-430 pm.     [place] Monday-Friday   6a - 5p.  sat 7a - 12p

## 2022-01-04 NOTE — TELEPHONE ENCOUNTER
Pt calling regarding past appointments. Allergy to milk related products, inhaler that was prescribed has milk related products. Pt would like the declining of labs removed from her notes. States it was never brought up or discussed. Also in the notes it was never mentioned about the lymph nodes being enlarged reason why mammogram is not being completed now. Possible inflammation from booster shot. Pt would like mammogram completed at [facility]    Ph, [phone]    Pt will hold off on mammogram. until next appointment. Will order then. Pt states she would like all the blood work that can be done all in one day, she hates blood work.      Will call pt back with  Lab hours and locations    [phone]

## 2022-01-05 DIAGNOSIS — I10 ESSENTIAL HYPERTENSION: ICD-10-CM

## 2022-01-05 LAB
ALBUMIN SERPL-MCNC: 4.5 G/DL (ref 3.5–4.6)
ALP BLD-CCNC: 106 U/L (ref 40–130)
ALT SERPL-CCNC: 36 U/L (ref 0–33)
ANION GAP SERPL CALCULATED.3IONS-SCNC: 12 MEQ/L (ref 9–15)
AST SERPL-CCNC: 27 U/L (ref 0–35)
BILIRUB SERPL-MCNC: 0.4 MG/DL (ref 0.2–0.7)
BUN BLDV-MCNC: 17 MG/DL (ref 6–20)
CALCIUM SERPL-MCNC: 9.7 MG/DL (ref 8.5–9.9)
CHLORIDE BLD-SCNC: 100 MEQ/L (ref 95–107)
CHOLESTEROL, TOTAL: 246 MG/DL (ref 0–199)
CO2: 27 MEQ/L (ref 20–31)
CREAT SERPL-MCNC: 0.65 MG/DL (ref 0.5–0.9)
GFR AFRICAN AMERICAN: >60
GFR NON-AFRICAN AMERICAN: >60
GLOBULIN: 2.9 G/DL (ref 2.3–3.5)
GLUCOSE BLD-MCNC: 84 MG/DL (ref 70–99)
HCT VFR BLD CALC: 43 % (ref 37–47)
HDLC SERPL-MCNC: 75 MG/DL (ref 40–59)
HEMOGLOBIN: 14.3 G/DL (ref 12–16)
LDL CHOLESTEROL CALCULATED: 155 MG/DL (ref 0–129)
MCH RBC QN AUTO: 31.2 PG (ref 27–31.3)
MCHC RBC AUTO-ENTMCNC: 33.2 % (ref 33–37)
MCV RBC AUTO: 93.9 FL (ref 82–100)
PDW BLD-RTO: 14 % (ref 11.5–14.5)
PLATELET # BLD: 271 K/UL (ref 130–400)
POTASSIUM SERPL-SCNC: 4.3 MEQ/L (ref 3.4–4.9)
RBC # BLD: 4.58 M/UL (ref 4.2–5.4)
SODIUM BLD-SCNC: 139 MEQ/L (ref 135–144)
TOTAL PROTEIN: 7.4 G/DL (ref 6.3–8)
TRIGL SERPL-MCNC: 79 MG/DL (ref 0–150)
TSH REFLEX: 2.49 UIU/ML (ref 0.44–3.86)
WBC # BLD: 7.3 K/UL (ref 4.8–10.8)

## 2022-01-17 ENCOUNTER — OFFICE VISIT (OUTPATIENT)
Dept: FAMILY MEDICINE CLINIC | Age: 62
End: 2022-01-17
Payer: COMMERCIAL

## 2022-01-17 VITALS
SYSTOLIC BLOOD PRESSURE: 130 MMHG | HEIGHT: 64 IN | TEMPERATURE: 98.4 F | BODY MASS INDEX: 23.69 KG/M2 | OXYGEN SATURATION: 98 % | DIASTOLIC BLOOD PRESSURE: 70 MMHG | HEART RATE: 87 BPM

## 2022-01-17 DIAGNOSIS — Z12.31 SCREENING MAMMOGRAM FOR BREAST CANCER: ICD-10-CM

## 2022-01-17 DIAGNOSIS — J45.40 MODERATE PERSISTENT ASTHMA WITHOUT COMPLICATION: ICD-10-CM

## 2022-01-17 DIAGNOSIS — M19.90 ARTHRITIS: ICD-10-CM

## 2022-01-17 DIAGNOSIS — S16.1XXA STRAIN OF STERNOCLEIDOMASTOID MUSCLE, INITIAL ENCOUNTER: ICD-10-CM

## 2022-01-17 DIAGNOSIS — I10 ESSENTIAL HYPERTENSION: Primary | ICD-10-CM

## 2022-01-17 DIAGNOSIS — Z80.3 FAMILY HISTORY OF BREAST CANCER: ICD-10-CM

## 2022-01-17 PROCEDURE — 99214 OFFICE O/P EST MOD 30 MIN: CPT | Performed by: STUDENT IN AN ORGANIZED HEALTH CARE EDUCATION/TRAINING PROGRAM

## 2022-01-17 RX ORDER — FLUTICASONE PROPIONATE 44 UG/1
2 AEROSOL, METERED RESPIRATORY (INHALATION) 2 TIMES DAILY
Qty: 1 EACH | Refills: 0 | Status: SHIPPED | OUTPATIENT
Start: 2022-01-17 | End: 2022-04-01 | Stop reason: SDUPTHER

## 2022-01-17 ASSESSMENT — ENCOUNTER SYMPTOMS
VOMITING: 0
COUGH: 1
CONSTIPATION: 0
ABDOMINAL PAIN: 0
ABDOMINAL DISTENTION: 0
DIARRHEA: 0
SHORTNESS OF BREATH: 0
SORE THROAT: 0
SINUS PRESSURE: 0

## 2022-01-17 NOTE — PROGRESS NOTES
2022    Jes Gomez (:  1960) is a 63 y.o. female, here for evaluation of the following medical concerns:  Chief Complaint   Patient presents with    2 Week Follow-Up    Hypertension    Discuss Medications     Inhaler. Pt lactose. Unable to take the pulmicort. Has used flonase i the past so possibly fluticasone inhaler. Pt would also like to discuss meloxicam, has not taken in the last 2 weeks due to side effects with BP medication.  Health Maintenance     Yes for Mammogram. Declined colonscopy at this time.  Cough     Every evening. Productive.       HPI  Past medical history: osteoarthritis-hands knees    Breathing problems  Exposed to cleaning chemicals 7 years ago  Has had off and on breathing issues since then  Worse in the winter time and with chemical exposure  Endorses cough, wheezing  No prior history of asthma or COPD  Has been given albuterol inhaler in the past which improves her symptoms     PFTs  showed moderately severe obstructive pulmonary disease. Nino Spatz is  significant response to bronchodilator  Started on Pulmicort however patient did not  at the pharmacy as she states she has a lactose allergy and there is lactose product in the inhaler  Referred to pulmonology at last apt - scheduled  with Dr. Rishi Dee    Does have intermittent productive cough with clear mucus production  Denies fevers or chills    Elevated BP reading  Initially started on 10mg lisinopril   Stopped due to lightheadedness, dry cough, bloated  Pt requested Telmisartan - started on 40mg daily 1/3  Blood pressure well controlled today and patient is not having any side effects    Neck pain  Left anterior neck  Occurring for a few months  Worse with movement of the head side to side  Has not tried anything for symptoms besides meloxicam which she takes for arthritis    OA  Started on Meloxicam 3 months ago  Feels like this has helped her pain a little bit  Pain located in her hands and knees  Previously had steroid injection but had severe migraine for 2 weeks following injection  Was told by Ortho they could do a fusion for her but patient declined     Stop meloxicam because she was having bloating and GI issues     Health maintenance  Breast cancer: would like ordered  Colon cancer: Declined screening  Basic labs: normal    Review of Systems   Constitutional: Negative for chills and fever. HENT: Negative for congestion, sinus pressure and sore throat. Respiratory: Positive for cough. Negative for shortness of breath. Cardiovascular: Negative for chest pain and palpitations. Gastrointestinal: Negative for abdominal distention, abdominal pain, constipation, diarrhea and vomiting. Musculoskeletal: Positive for neck pain. Negative for arthralgias and myalgias. Skin: Negative for rash and wound. Neurological: Negative for speech difficulty and light-headedness. Psychiatric/Behavioral: Negative for suicidal ideas. The patient is not nervous/anxious. Prior to Visit Medications    Medication Sig Taking?  Authorizing Provider   fluticasone (FLOVENT HFA) 44 MCG/ACT inhaler Inhale 2 puffs into the lungs 2 times daily Yes Jerry Alvarez, DO   telmisartan (MICARDIS) 40 MG tablet Take 1 tablet by mouth daily Yes Jerry Alvarez, DO   albuterol sulfate HFA (VENTOLIN HFA) 108 (90 Base) MCG/ACT inhaler Inhale 2 puffs into the lungs 4 times daily as needed for Wheezing Yes Jerry Alvarez, DO        Medications Discontinued During This Encounter   Medication Reason    budesonide (PULMICORT FLEXHALER) 180 MCG/ACT AEPB inhaler Patient Choice    meloxicam (MOBIC) 15 MG tablet Patient Choice       Allergies   Allergen Reactions    Lisinopril Other (See Comments), Shortness Of Breath and Swelling    Molds & Smuts Shortness Of Breath and Palpitations    Milk-Related Compounds Other (See Comments)    Pollen Extract Itching, Other (See Comments) and Swelling    Penicillins Itching and Rash       Past Medical History:   Diagnosis Date    Arthritis of both knees     Arthritis of right hand     Fibroadenoma of breast, right        Past Surgical History:   Procedure Laterality Date    BREAST ENHANCEMENT SURGERY      BREAST FIBROADENOMA SURGERY       SECTION      PARTIAL HYSTERECTOMY      TONSILLECTOMY      WISDOM TOOTH EXTRACTION         Social History     Socioeconomic History    Marital status:      Spouse name: Not on file    Number of children: Not on file    Years of education: Not on file    Highest education level: Not on file   Occupational History    Not on file   Tobacco Use    Smoking status: Never Smoker    Smokeless tobacco: Never Used   Substance and Sexual Activity    Alcohol use: Not on file    Drug use: Not on file    Sexual activity: Not on file   Other Topics Concern    Not on file   Social History Narrative    Not on file     Social Determinants of Health     Financial Resource Strain: Low Risk     Difficulty of Paying Living Expenses: Not hard at all   Food Insecurity: No Food Insecurity    Worried About Running Out of Food in the Last Year: Never true    Elham of Food in the Last Year: Never true   Transportation Needs:     Lack of Transportation (Medical): Not on file    Lack of Transportation (Non-Medical):  Not on file   Physical Activity:     Days of Exercise per Week: Not on file    Minutes of Exercise per Session: Not on file   Stress:     Feeling of Stress : Not on file   Social Connections:     Frequency of Communication with Friends and Family: Not on file    Frequency of Social Gatherings with Friends and Family: Not on file    Attends [employer] Services: Not on file    Active Member of Clubs or Organizations: Not on file    Attends Club or Organization Meetings: Not on file    Marital Status: Not on file   Intimate Partner Violence:     Fear of Current or Ex-Partner: Not on file    Emotionally Abused: Not on file  Physically Abused: Not on file    Sexually Abused: Not on file   Housing Stability:     Unable to Pay for Housing in the Last Year: Not on file    Number of Places Lived in the Last Year: Not on file    Unstable Housing in the Last Year: Not on file        Family History   Problem Relation Age of Onset    Cancer Mother         lung    High Blood Pressure Mother     High Blood Pressure Father     Diabetes Neg Hx     Kidney Disease Neg Hx     Stroke Neg Hx        Vitals:    01/17/22 1117   BP: 130/70   Pulse: 87   Temp: 98.4 °F (36.9 °C)   SpO2: 98%   Height: 5' 4\" (1.626 m)       Estimated body mass index is 23.69 kg/m² as calculated from the following:    Height as of this encounter: 5' 4\" (1.626 m). Weight as of 1/3/22: 138 lb (62.6 kg). No results for input(s): WBC, RBC, HGB, HCT, MCV, MCH, MCHC, RDW, PLT, MPV in the last 72 hours. No results for input(s): NA, K, CL, CO2, BUN, CREATININE, GLUCOSE, CALCIUM, PROT, LABALBU, BILITOT, ALKPHOS, AST, ALT in the last 72 hours. No results found for: LABA1C    No results found. Physical Exam  Constitutional:       General: She is not in acute distress. Appearance: Normal appearance. HENT:      Head: Normocephalic and atraumatic. Eyes:      Extraocular Movements: Extraocular movements intact. Conjunctiva/sclera: Conjunctivae normal.   Musculoskeletal:         General: Tenderness (Tenderness at the left SCM origin and along the muscle belly) present. No deformity. Normal range of motion. Skin:     Findings: No lesion or rash. Neurological:      General: No focal deficit present. Mental Status: She is alert. Mental status is at baseline. Psychiatric:         Mood and Affect: Mood normal.         Behavior: Behavior normal.         Thought Content: Thought content normal.         ASSESSMENT/PLAN:  1. Essential hypertension  Blood pressure well controlled, continue telmisartan 40 mg daily    2.  Moderate persistent asthma without complication  Follow-up with Dr. Ricardo Greenfield 2/9 as scheduled  Will trial Flovent in the meantime    - fluticasone (FLOVENT HFA) 44 MCG/ACT inhaler; Inhale 2 puffs into the lungs 2 times daily  Dispense: 1 each; Refill: 0    3. Arthritis  Discontinued meloxicam due to GI side effects including bloating and diarrhea    4. Strain of sternocleidomastoid muscle  Neck pain likely musculoskeletal in nature as she has pain with turning her head and there is tenderness to palpation at the insertion of the SCM on exam  Discussed rice therapy    4. Screening mammogram for breast cancer  - [facility] DIGITAL SCREEN W OR WO CAD BILATERAL; Future    5. Family history of breast cancer  Patient would like referral for genetics as she does have a significant family history of breast cancer at a young age with several aunts and cousins diagnosed with breast cancer or GYN cancers    - External Referral to Genetics      Medications Discontinued During This Encounter   Medication Reason    budesonide (PULMICORT FLEXHALER) 180 MCG/ACT AEPB inhaler Patient Choice    meloxicam (MOBIC) 15 MG tablet Patient Choice       ---------------------------------------------------------------------  Side effects, adverse effects of the medication prescribed today, as well as treatment plan/ rationale and result expectations have been discussed with the patient who expresses understanding and desires to proceed. Close follow up to evaluate treatment results and for coordination of care. I have reviewed the patient's medical history in detail and updated the computerized patient record. As always, patient is advised that if symptoms worsen in any way they will proceed to the nearest emergency room. --------------------------------------------------------------------    Return in about 4 weeks (around 2/14/2022) for f/u chronic conditions. An  electronic signature was used to authenticate this note.     --Derek Coley DO on 1/17/2022 at 12:08 PM

## 2022-01-21 ENCOUNTER — TELEPHONE (OUTPATIENT)
Dept: FAMILY MEDICINE CLINIC | Age: 62
End: 2022-01-21

## 2022-01-21 NOTE — TELEPHONE ENCOUNTER
Insurance pharmacy called to say that flovent was denied. Said we need to try flovent or Qvar first.  States they do not have lactose.

## 2022-01-31 DIAGNOSIS — R06.2 NOCTURNAL COUGH WITH WHEEZE: ICD-10-CM

## 2022-01-31 DIAGNOSIS — R05.8 NOCTURNAL COUGH WITH WHEEZE: ICD-10-CM

## 2022-01-31 DIAGNOSIS — I10 ESSENTIAL HYPERTENSION: ICD-10-CM

## 2022-01-31 RX ORDER — ALBUTEROL SULFATE 90 UG/1
2 AEROSOL, METERED RESPIRATORY (INHALATION) 4 TIMES DAILY PRN
Qty: 18 G | Refills: 0 | Status: SHIPPED | OUTPATIENT
Start: 2022-01-31 | End: 2022-03-11 | Stop reason: SDUPTHER

## 2022-01-31 RX ORDER — TELMISARTAN 40 MG/1
40 TABLET ORAL DAILY
Qty: 30 TABLET | Refills: 0 | Status: SHIPPED | OUTPATIENT
Start: 2022-01-31 | End: 2022-02-18 | Stop reason: SDUPTHER

## 2022-01-31 NOTE — TELEPHONE ENCOUNTER
Pt asking for a call when done   [phone]  Dr Deisi Kong pt pt asking for this as soon as possible due to the snow warning

## 2022-02-02 LAB — MAMMOGRAPHY, EXTERNAL: NORMAL

## 2022-02-09 ENCOUNTER — HOSPITAL ENCOUNTER (OUTPATIENT)
Dept: GENERAL RADIOLOGY | Age: 62
Discharge: HOME | End: 2022-02-11
Payer: COMMERCIAL

## 2022-02-09 ENCOUNTER — OFFICE VISIT (OUTPATIENT)
Dept: PULMONOLOGY | Age: 62
End: 2022-02-09
Payer: COMMERCIAL

## 2022-02-09 VITALS
HEIGHT: 64 IN | OXYGEN SATURATION: 99 % | WEIGHT: 128 LBS | BODY MASS INDEX: 21.85 KG/M2 | SYSTOLIC BLOOD PRESSURE: 121 MMHG | DIASTOLIC BLOOD PRESSURE: 85 MMHG | TEMPERATURE: 98.5 F

## 2022-02-09 DIAGNOSIS — R06.02 SHORTNESS OF BREATH: ICD-10-CM

## 2022-02-09 DIAGNOSIS — J44.9 CHRONIC OBSTRUCTIVE PULMONARY DISEASE, UNSPECIFIED COPD TYPE (HCC): ICD-10-CM

## 2022-02-09 DIAGNOSIS — J44.9 CHRONIC OBSTRUCTIVE PULMONARY DISEASE, UNSPECIFIED COPD TYPE (HCC): Primary | ICD-10-CM

## 2022-02-09 PROCEDURE — 99204 OFFICE O/P NEW MOD 45 MIN: CPT | Performed by: INTERNAL MEDICINE

## 2022-02-09 PROCEDURE — 71046 X-RAY EXAM CHEST 2 VIEWS: CPT

## 2022-02-09 ASSESSMENT — ENCOUNTER SYMPTOMS
RHINORRHEA: 0
VOMITING: 0
TROUBLE SWALLOWING: 0
SHORTNESS OF BREATH: 1
VOICE CHANGE: 0
EYE DISCHARGE: 0
ABDOMINAL PAIN: 0
WHEEZING: 1
SORE THROAT: 0
CHEST TIGHTNESS: 0
COUGH: 1
EYE ITCHING: 0
SINUS PRESSURE: 0
DIARRHEA: 0
NAUSEA: 0

## 2022-02-09 NOTE — PROGRESS NOTES
Subjective:     Kian Ryan is a 63 y.o. female who complains today of:     Chief Complaint   Patient presents with    New Patient     asthma       HPI  C/o shortness of breath  with exertion for 10  Years but getting worse in the last 1 year  She has cough , chest tightness when she has goes  from warm to cold weather. He also has c/o  Wheezing more at night and chest tightness. .  She is currently using albuterol HFA as needed basis. 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 No Chest pain with radiation  or pleuritic pain. No Hemoptysis. No  leg edema. No orthopnea. No Fever or chills. No Rhinorrhea and postnasal drip.     Allergies:  Lisinopril, Molds & smuts, Milk-related compounds, Pollen extract, and Penicillins  Past Medical History:   Diagnosis Date    Arthritis of both knees     Arthritis of right hand     Fibroadenoma of breast, right      Past Surgical History:   Procedure Laterality Date    BREAST ENHANCEMENT SURGERY      BREAST FIBROADENOMA SURGERY       SECTION      PARTIAL HYSTERECTOMY      TONSILLECTOMY      WISDOM TOOTH EXTRACTION       Family History   Problem Relation Age of Onset    Cancer Mother         lung    High Blood Pressure Mother     High Blood Pressure Father     Diabetes Neg Hx     Kidney Disease Neg Hx     Stroke Neg Hx      Social History     Socioeconomic History    Marital status:      Spouse name: Not on file    Number of children: Not on file    Years of education: Not on file    Highest education level: Not on file   Occupational History    Not on file   Tobacco Use    Smoking status: Never Smoker    Smokeless tobacco: Never Used   Substance and Sexual Activity    Alcohol use: Not on file    Drug use: Not on file    Sexual activity: Not on file   Other Topics Concern    Not on Negative for difficulty urinating and hematuria. Musculoskeletal: Negative for arthralgias, joint swelling and myalgias. Skin: Negative for rash. Allergic/Immunologic: Negative for environmental allergies and food allergies. Neurological: Negative for dizziness, tremors, weakness and headaches. Psychiatric/Behavioral: Negative for behavioral problems and sleep disturbance.         :     Vitals:    02/09/22 1424   BP: 121/85   Temp: 98.5 °F (36.9 °C)   SpO2: 99%   Weight: 128 lb (58.1 kg)   Height: 5' 4\" (1.626 m)     Wt Readings from Last 3 Encounters:   02/09/22 128 lb (58.1 kg)   01/03/22 138 lb (62.6 kg)   12/07/21 141 lb (64 kg)         Physical Exam  Constitutional:       General: She is not in acute distress. Appearance: She is well-developed. She is not diaphoretic. HENT:      Head: Normocephalic and atraumatic. Nose: Nose normal.   Eyes:      Pupils: Pupils are equal, round, and reactive to light. Neck:      Thyroid: No thyromegaly. Vascular: No JVD. Trachea: No tracheal deviation. Cardiovascular:      Rate and Rhythm: Normal rate and regular rhythm. Heart sounds: No murmur heard. No friction rub. No gallop. Pulmonary:      Effort: No respiratory distress. Breath sounds: No wheezing or rales. Chest:      Chest wall: No tenderness. Abdominal:      General: There is no distension. Tenderness: There is no abdominal tenderness. There is no rebound. Musculoskeletal:         General: Normal range of motion. Lymphadenopathy:      Cervical: No cervical adenopathy. Skin:     General: Skin is warm and dry. Neurological:      Mental Status: She is alert and oriented to person, place, and time.       Coordination: Coordination normal.         Current Outpatient Medications   Medication Sig Dispense Refill    telmisartan (MICARDIS) 40 MG tablet Take 1 tablet by mouth daily 30 tablet 0    albuterol sulfate HFA (VENTOLIN HFA) 108 (90 Base) MCG/ACT inhaler Inhale 2 puffs into the lungs 4 times daily as needed for Wheezing 18 g 0    fluticasone (FLOVENT HFA) 44 MCG/ACT inhaler Inhale 2 puffs into the lungs 2 times daily (Patient not taking: Reported on 2/9/2022) 1 each 0     No current facility-administered medications for this visit. Assessment/Plan:     1. Chronic obstructive pulmonary disease, unspecified COPD type (HCC)  C/o shortness of breath  with exertion for 10  Years but worse in 1 year. She has cough , chest tightness when she has from warm to cold weather. C/o Wheezing more at night. C/o Chest tightness. She has c/o black mold ,exposure 10 years ago , She has been  and in [address], expose to dust.  Spraying chemical in farm. She is using bronchodilator with albuterol HFA prn. Continue albuterol HFA as needed basis  She had a PFT done shows moderately severe obstructive pulmonary disease with significant response to bronchodilator hyperinflation and DLCO is within normal range. I will request chest x-ray. And alpha-1 antitrypsin level. Will give sample of ICS LAMA LABA. - XR CHEST STANDARD (2 VW); Future  - Alpha-1-Antitrypsin; Future    2. Shortness of breath  She having  chronic shortness of breath which is likely due to COPD, Continue  Bronchodilator, as before. keep Spo2 90% or above. Return in about 4 weeks (around 3/9/2022) for shortness of breath, COPD.       Jose Urbina MD

## 2022-02-11 LAB — ALPHA-1 ANTITRYPSIN: 135 MG/DL (ref 90–200)

## 2022-02-18 DIAGNOSIS — I10 ESSENTIAL HYPERTENSION: ICD-10-CM

## 2022-02-18 RX ORDER — TELMISARTAN 40 MG/1
40 TABLET ORAL DAILY
Qty: 30 TABLET | Refills: 0 | Status: SHIPPED | OUTPATIENT
Start: 2022-02-18

## 2022-02-18 NOTE — TELEPHONE ENCOUNTER
Pt asking for refills. Accidentally left prescription out of town. Told patient that message was routed to provider. Patient then started yelling at me using the F word. Stated that she can never get anywhere when she calls here. Proceeded to hang up on me.  RN

## 2022-02-18 NOTE — TELEPHONE ENCOUNTER
Pt  aware that script was sent in. He did say thank you to all of us. He said that he wanted to know why someone in the office said to him that there was nothing we could do since the provider was not in the office. She went hysterical  after hearing that and that's why he had to get involved.

## 2022-02-18 NOTE — TELEPHONE ENCOUNTER
Pt of VN-Pt  calling stating that his wife left in [place] and is needing the micardis refilled today, please advise, spouses phone number is [phone]

## 2022-02-19 ENCOUNTER — TELEPHONE (OUTPATIENT)
Dept: FAMILY MEDICINE CLINIC | Age: 62
End: 2022-02-19

## 2022-02-19 NOTE — TELEPHONE ENCOUNTER
----- Message from [place] sent at 2/19/2022 10:29 AM EST -----  Subject: Message to Provider    QUESTIONS  Information for Provider? PT  would like to speak with the Practice   Manager about the call yesterday and the issue stemming from that. Please   contact him at [phone]. He would like to be contacted to address the   issue because the PT is too upset with the matter. ---------------------------------------------------------------------------  --------------  Marek QUEEN  What is the best way for the office to contact you? OK to leave message on   voicemail  Preferred Call Back Phone Number? [phone]  ---------------------------------------------------------------------------  --------------  SCRIPT ANSWERS  Relationship to Patient? Other  Representative Name? Miller Farley  Is the Representative on the appropriate HIPAA document in Epic?  Yes

## 2022-03-11 ENCOUNTER — OFFICE VISIT (OUTPATIENT)
Dept: PULMONOLOGY | Age: 62
End: 2022-03-11
Payer: COMMERCIAL

## 2022-03-11 VITALS
HEIGHT: 64 IN | HEART RATE: 73 BPM | OXYGEN SATURATION: 98 % | WEIGHT: 135 LBS | SYSTOLIC BLOOD PRESSURE: 123 MMHG | BODY MASS INDEX: 23.05 KG/M2 | DIASTOLIC BLOOD PRESSURE: 86 MMHG

## 2022-03-11 DIAGNOSIS — J44.9 CHRONIC OBSTRUCTIVE PULMONARY DISEASE, UNSPECIFIED COPD TYPE (HCC): Primary | ICD-10-CM

## 2022-03-11 PROCEDURE — 99214 OFFICE O/P EST MOD 30 MIN: CPT | Performed by: INTERNAL MEDICINE

## 2022-03-11 RX ORDER — ALBUTEROL SULFATE 90 UG/1
2 AEROSOL, METERED RESPIRATORY (INHALATION) 4 TIMES DAILY PRN
Qty: 18 G | Refills: 0 | Status: SHIPPED | OUTPATIENT
Start: 2022-03-11 | End: 2022-04-05 | Stop reason: SDUPTHER

## 2022-03-11 ASSESSMENT — ENCOUNTER SYMPTOMS
RHINORRHEA: 0
COUGH: 1
VOICE CHANGE: 0
WHEEZING: 1
VOMITING: 0
CHEST TIGHTNESS: 0
SORE THROAT: 0
SINUS PRESSURE: 0
EYE ITCHING: 0
EYE DISCHARGE: 0
SHORTNESS OF BREATH: 1
ABDOMINAL PAIN: 0
NAUSEA: 0
DIARRHEA: 0
TROUBLE SWALLOWING: 0

## 2022-03-11 NOTE — PROGRESS NOTES
Subjective:     Kyara Gerard is a 63 y.o. female who complains today of:     Chief Complaint   Patient presents with    COPD     CXR result       HPI    She had CXR show COPD , alpha 1 antitrypsin is normal range. PFT show moderate COPD. She said she is feeling better since she is on breztri HFA  2 puff BID . Albuterol HFA prn .  C/o shortness of breath  with exertion. She has cough , chest tightness when she has goes  from warm to cold weather. He also has C/o Wheezing more at night and chest tightness. She is currently using albuterol HFA as needed basis. No Chest pain with radiation  or pleuritic pain. No Hemoptysis. No  leg edema. No orthopnea. No Fever or chills. No Rhinorrhea and postnasal drip.     Allergies:  Lisinopril, Molds & smuts, Milk-related compounds, Pollen extract, and Penicillins  Past Medical History:   Diagnosis Date    Arthritis of both knees     Arthritis of right hand     Fibroadenoma of breast, right      Past Surgical History:   Procedure Laterality Date    BREAST ENHANCEMENT SURGERY      BREAST FIBROADENOMA SURGERY       SECTION      PARTIAL HYSTERECTOMY      TONSILLECTOMY      WISDOM TOOTH EXTRACTION       Family History   Problem Relation Age of Onset    Cancer Mother         lung    High Blood Pressure Mother     High Blood Pressure Father     Diabetes Neg Hx     Kidney Disease Neg Hx     Stroke Neg Hx      Social History     Socioeconomic History    Marital status:      Spouse name: Not on file    Number of children: Not on file    Years of education: Not on file    Highest education level: Not on file   Occupational History    Not on file   Tobacco Use    Smoking status: Never Smoker    Smokeless tobacco: Never Used   Substance and Sexual Activity    Alcohol use: Not on file    Drug use: Not on file    Sexual activity: Not on file   Other Topics Concern    Not on file   Social History Narrative    Not on file Social Determinants of Health     Financial Resource Strain: Low Risk     Difficulty of Paying Living Expenses: Not hard at all   Food Insecurity: No Food Insecurity    Worried About Running Out of Food in the Last Year: Never true    [place] of Food in the Last Year: Never true   Transportation Needs:     Lack of Transportation (Medical): Not on file    Lack of Transportation (Non-Medical): Not on file   Physical Activity:     Days of Exercise per Week: Not on file    Minutes of Exercise per Session: Not on file   Stress:     Feeling of Stress : Not on file   Social Connections:     Frequency of Communication with Friends and Family: Not on file    Frequency of Social Gatherings with Friends and Family: Not on file    Attends Holiness Services: Not on file    Active Member of [address] [place] [address] or Organizations: Not on file    Attends Club or Organization Meetings: Not on file    Marital Status: Not on file   Intimate Partner Violence:     Fear of Current or Ex-Partner: Not on file    Emotionally Abused: Not on file    Physically Abused: Not on file    Sexually Abused: Not on file   Housing Stability:     Unable to Pay for Housing in the Last Year: Not on file    Number of [place] in the Last Year: Not on file    Unstable Housing in the Last Year: Not on file         Review of Systems   Constitutional: Negative for chills, diaphoresis, fatigue and fever. HENT: Negative for congestion, mouth sores, nosebleeds, postnasal drip, rhinorrhea, sinus pressure, sneezing, sore throat, trouble swallowing and voice change. Eyes: Negative for discharge, itching and visual disturbance. Respiratory: Positive for cough, shortness of breath and wheezing. Negative for chest tightness. Cardiovascular: Negative for chest pain, palpitations and leg swelling. Gastrointestinal: Negative for abdominal pain, diarrhea, nausea and vomiting. Genitourinary: Negative for difficulty urinating and hematuria. Musculoskeletal: Negative for arthralgias, joint swelling and myalgias. Skin: Negative for rash. Allergic/Immunologic: Negative for environmental allergies and food allergies. Neurological: Negative for dizziness, tremors, weakness and headaches. Psychiatric/Behavioral: Negative for behavioral problems and sleep disturbance.         :     Vitals:    03/11/22 1129   BP: 123/86   Pulse: 73   SpO2: 98%   Weight: 135 lb (61.2 kg)   Height: 5' 4\" (1.626 m)     Wt Readings from Last 3 Encounters:   03/11/22 135 lb (61.2 kg)   02/09/22 128 lb (58.1 kg)   01/03/22 138 lb (62.6 kg)         Physical Exam  Constitutional:       General: She is not in acute distress. Appearance: She is well-developed. She is not diaphoretic. HENT:      Head: Normocephalic and atraumatic. Nose: Nose normal.   Eyes:      Pupils: Pupils are equal, round, and reactive to light. Neck:      Thyroid: No thyromegaly. Vascular: No JVD. Trachea: No tracheal deviation. Cardiovascular:      Rate and Rhythm: Normal rate and regular rhythm. Heart sounds: No murmur heard. No friction rub. No gallop. Pulmonary:      Effort: No respiratory distress. Breath sounds: No wheezing or rales. Comments: diminished Breath sound bilaterally. Chest:      Chest wall: No tenderness. Abdominal:      General: There is no distension. Tenderness: There is no abdominal tenderness. There is no rebound. Musculoskeletal:         General: Normal range of motion. Lymphadenopathy:      Cervical: No cervical adenopathy. Skin:     General: Skin is warm and dry. Neurological:      Mental Status: She is alert and oriented to person, place, and time.       Coordination: Coordination normal.         Current Outpatient Medications   Medication Sig Dispense Refill    albuterol sulfate HFA (VENTOLIN HFA) 108 (90 Base) MCG/ACT inhaler Inhale 2 puffs into the lungs 4 times daily as needed for Wheezing 18 g 0    Budeson-Glycopyrrol-Formoterol 160-9-4.8 MCG/ACT AERO 2 puff BID 1 each 3    telmisartan (MICARDIS) 40 MG tablet Take 1 tablet by mouth daily 30 tablet 0    fluticasone (FLOVENT HFA) 44 MCG/ACT inhaler Inhale 2 puffs into the lungs 2 times daily 1 each 0     No current facility-administered medications for this visit. Results for orders placed during the hospital encounter of 02/09/22    XR CHEST (2 VW)    Narrative  CHEST X-RAY. TECHNIQUE:  PA and lateral views of the chest.    CLINICAL INDICATION:  61-year-old female presenting with history of COPD and asthma. COMPARISON:  None. PROCEDURE AND FINDINGS:  The cardiomediastinal silhouette is unremarkable. Biapical prominence consistent with the patient's diagnosis of COPD. Increased bilateral lung volumes is seen. The bronchovascular markings are unremarkable bilaterally. The costophrenic angles are clear, no evidence of lung infiltrate, pleural effusion or parenchymal lung mass. Mild levoscoliosis of the thoracic spine with compensatory dextroscoliosis of the thoracolumbar junction. Degenerative bony changes are seen. Bone demineralization seen. Impression  COPD changes, no evidence of acute cardiopulmonary disease is seen. Assessment/Plan:     1. Chronic obstructive pulmonary disease, unspecified COPD type ([address]  She had CXR show COPD , alpha 1 antitrypsin is normal range. PFT show moderate COPD. Raven Brittle Chest x-ray and PFT both result reviewed with patient. She said she is feeling better since she is on breztri HFA  2 puff BID. She is using albuterol HFA prn .  C/o shortness of breath  with exertion. She has cough , chest tightness when she has goes  from warm to cold weather. He also has C/o Wheezing more at night and chest tightness. She is currently using albuterol HFA as needed basis. Continue same    - albuterol sulfate HFA (VENTOLIN HFA) 108 (90 Base) MCG/ACT inhaler;  Inhale 2 puffs into the lungs 4 times daily as needed for Wheezing  Dispense: 18 g; Refill: 0  - Budeson-Glycopyrrol-Formoterol 160-9-4.8 MCG/ACT AERO; 2 puff BID  Dispense: 1 each; Refill: 3     Return in about 4 months (around 7/11/2022) for COPD.       Daily Morrow MD

## 2022-03-18 NOTE — PROGRESS NOTES
Pt states she completed mammogram at [facility], results have not been received yet. Asked patient to reach out and have them sent to us. Pt agreed.

## 2022-03-31 DIAGNOSIS — J44.9 CHRONIC OBSTRUCTIVE PULMONARY DISEASE, UNSPECIFIED COPD TYPE (HCC): Primary | ICD-10-CM

## 2022-03-31 DIAGNOSIS — J45.40 MODERATE PERSISTENT ASTHMA WITHOUT COMPLICATION: ICD-10-CM

## 2022-03-31 NOTE — TELEPHONE ENCOUNTER
Rx requested:  Requested Prescriptions     Pending Prescriptions Disp Refills    Budeson-Glycopyrrol-Formoterol 160-9-4.8 MCG/ACT AERO 1 each 3     Si puff BID    fluticasone (FLOVENT HFA) 44 MCG/ACT inhaler 1 each 0     Sig: Inhale 2 puffs into the lungs 2 times daily       Last Office Visit:   3/11/2022      Next Visit Date:  Future Appointments   Date Time Provider Lily Kolb   2022 11:00 AM Sapna Fox MD [facility]

## 2022-04-01 RX ORDER — FLUTICASONE PROPIONATE 44 UG/1
2 AEROSOL, METERED RESPIRATORY (INHALATION) 2 TIMES DAILY
Qty: 1 EACH | Refills: 0 | Status: SHIPPED | OUTPATIENT
Start: 2022-04-01 | End: 2022-07-14 | Stop reason: ALTCHOICE

## 2022-04-05 DIAGNOSIS — J44.9 CHRONIC OBSTRUCTIVE PULMONARY DISEASE, UNSPECIFIED COPD TYPE (HCC): Primary | ICD-10-CM

## 2022-04-05 RX ORDER — ALBUTEROL SULFATE 90 UG/1
2 AEROSOL, METERED RESPIRATORY (INHALATION) 4 TIMES DAILY PRN
Qty: 18 G | Refills: 0 | Status: SHIPPED | OUTPATIENT
Start: 2022-04-05 | End: 2022-07-14 | Stop reason: SDUPTHER

## 2022-04-05 NOTE — TELEPHONE ENCOUNTER
Rx requested:  Requested Prescriptions     Pending Prescriptions Disp Refills    albuterol sulfate HFA (VENTOLIN HFA) 108 (90 Base) MCG/ACT inhaler 18 g 0     Sig: Inhale 2 puffs into the lungs 4 times daily as needed for Wheezing    Budeson-Glycopyrrol-Formoterol 160-9-4.8 MCG/ACT AERO 1 each 3     Si puff BID       Last Office Visit:   3/11/2022      Next Visit Date:  Future Appointments   Date Time Provider Lily Kolb   2022 11:00 AM Corry Hernández MD [address] [place] [address]

## 2022-04-06 ENCOUNTER — TELEPHONE (OUTPATIENT)
Dept: PULMONOLOGY | Age: 62
End: 2022-04-06

## 2022-05-09 ENCOUNTER — TELEPHONE (OUTPATIENT)
Dept: GASTROENTEROLOGY | Age: 62
End: 2022-05-09

## 2022-05-09 NOTE — TELEPHONE ENCOUNTER
Spoke to patient , She stated that she will never come back to [facility].  She asked not to be contacted again

## 2022-07-14 ENCOUNTER — OFFICE VISIT (OUTPATIENT)
Dept: PULMONOLOGY | Age: 62
End: 2022-07-14
Payer: COMMERCIAL

## 2022-07-14 VITALS
RESPIRATION RATE: 18 BRPM | TEMPERATURE: 97.3 F | WEIGHT: 136 LBS | OXYGEN SATURATION: 98 % | BODY MASS INDEX: 23.22 KG/M2 | HEART RATE: 71 BPM | HEIGHT: 64 IN | SYSTOLIC BLOOD PRESSURE: 122 MMHG | DIASTOLIC BLOOD PRESSURE: 84 MMHG

## 2022-07-14 DIAGNOSIS — J45.40 MODERATE PERSISTENT ASTHMA WITHOUT COMPLICATION: Primary | ICD-10-CM

## 2022-07-14 DIAGNOSIS — J44.9 CHRONIC OBSTRUCTIVE PULMONARY DISEASE, UNSPECIFIED COPD TYPE (HCC): ICD-10-CM

## 2022-07-14 PROCEDURE — 99214 OFFICE O/P EST MOD 30 MIN: CPT | Performed by: INTERNAL MEDICINE

## 2022-07-14 RX ORDER — ALBUTEROL SULFATE 90 UG/1
2 AEROSOL, METERED RESPIRATORY (INHALATION) 4 TIMES DAILY PRN
Qty: 18 G | Refills: 0 | Status: CANCELLED | OUTPATIENT
Start: 2022-07-14 | End: 2022-08-13

## 2022-07-14 RX ORDER — ALBUTEROL SULFATE 90 UG/1
2 AEROSOL, METERED RESPIRATORY (INHALATION) 4 TIMES DAILY PRN
Qty: 3 EACH | Refills: 1 | Status: SHIPPED | OUTPATIENT
Start: 2022-07-14 | End: 2022-08-28

## 2022-07-14 ASSESSMENT — ENCOUNTER SYMPTOMS
EYE ITCHING: 0
WHEEZING: 0
SHORTNESS OF BREATH: 1
COUGH: 1
SORE THROAT: 0
EYE DISCHARGE: 0
VOICE CHANGE: 0
NAUSEA: 0
RHINORRHEA: 0
SINUS PRESSURE: 0
VOMITING: 0
TROUBLE SWALLOWING: 0
ABDOMINAL PAIN: 0
CHEST TIGHTNESS: 0
DIARRHEA: 0

## 2022-07-14 NOTE — PROGRESS NOTES
Subjective:     Deya Connolly is a 63 y.o. female who complains today of:     Chief Complaint   Patient presents with    Follow-up     patient is following up on COPD. HPI  She had CXR show COPD , alpha 1 antitrypsin is normal range. PFT show moderate COPD. She is on breztri HFA  2 puff BID . Albuterol HFA prn .  C/o shortness of breath  with exertion. She has cough , chest tightness when she has goes  from warm to cold weather. No wheezing since using breztri  She is currently using albuterol HFA as needed basis. No Chest pain with radiation  or pleuritic pain. No Hemoptysis. No  leg edema. No orthopnea. No Fever or chills. No Rhinorrhea and postnasal drip.        Allergies:  Lisinopril, Molds & smuts, Milk-related compounds, Pollen extract, and Penicillins  Past Medical History:   Diagnosis Date    Arthritis of both knees     Arthritis of right hand     Fibroadenoma of breast, right      Past Surgical History:   Procedure Laterality Date    BREAST ENHANCEMENT SURGERY      BREAST FIBROADENOMA SURGERY       SECTION      PARTIAL HYSTERECTOMY (CERVIX NOT REMOVED)      TONSILLECTOMY      WISDOM TOOTH EXTRACTION       Family History   Problem Relation Age of Onset    Cancer Mother         lung    High Blood Pressure Mother     High Blood Pressure Father     Diabetes Neg Hx     Kidney Disease Neg Hx     Stroke Neg Hx      Social History     Socioeconomic History    Marital status:      Spouse name: Not on file    Number of children: Not on file    Years of education: Not on file    Highest education level: Not on file   Occupational History    Not on file   Tobacco Use    Smoking status: Never Smoker    Smokeless tobacco: Never Used   Substance and Sexual Activity    Alcohol use: Not on file    Drug use: Not on file    Sexual activity: Not on file   Other Topics Concern    Not on file   Social History Narrative    Not on file     Social Determinants of Health     Financial Resource Strain: Low Risk     Difficulty of Paying Living Expenses: Not hard at all   Food Insecurity: No Food Insecurity    Worried About [facility] in the Last Year: Never true    Ran Out of Food in the Last Year: Never true   Transportation Needs:     Lack of Transportation (Medical): Not on file    Lack of Transportation (Non-Medical): Not on file   Physical Activity:     Days of Exercise per Week: Not on file    Minutes of Exercise per Session: Not on file   Stress:     Feeling of Stress : Not on file   Social Connections:     Frequency of Communication with Friends and Family: Not on file    Frequency of Social Gatherings with Friends and Family: Not on file    Attends Jain Services: Not on file    Active Member of Clubs or Organizations: Not on file    Attends Club or Organization Meetings: Not on file    Marital Status: Not on file   Intimate Partner Violence:     Fear of Current or Ex-Partner: Not on file    Emotionally Abused: Not on file    Physically Abused: Not on file    Sexually Abused: Not on file   Housing Stability:     Unable to Pay for Housing in the Last Year: Not on file    Number of [place] in the Last Year: Not on file    Unstable Housing in the Last Year: Not on file         Review of Systems   Constitutional: Negative for chills, diaphoresis, fatigue and fever. HENT: Negative for congestion, mouth sores, nosebleeds, postnasal drip, rhinorrhea, sinus pressure, sneezing, sore throat, trouble swallowing and voice change. Eyes: Negative for discharge, itching and visual disturbance. Respiratory: Positive for cough and shortness of breath. Negative for chest tightness and wheezing. Cardiovascular: Negative for chest pain, palpitations and leg swelling. Gastrointestinal: Negative for abdominal pain, diarrhea, nausea and vomiting. Genitourinary: Negative for difficulty urinating and hematuria.    Musculoskeletal: Negative for arthralgias, joint swelling and myalgias. Skin: Negative for rash. Allergic/Immunologic: Negative for environmental allergies and food allergies. Neurological: Negative for dizziness, tremors, weakness and headaches. Psychiatric/Behavioral: Negative for behavioral problems and sleep disturbance.         :     Vitals:    07/14/22 1109   BP: 122/84   Site: Left Upper Arm   Position: Sitting   Cuff Size: Small Adult   Pulse: 71   Resp: 18   Temp: 97.3 °F (36.3 °C)   TempSrc: Temporal   SpO2: 98%   Weight: 136 lb (61.7 kg)   Height: 5' 4\" (1.626 m)     Wt Readings from Last 3 Encounters:   07/14/22 136 lb (61.7 kg)   03/11/22 135 lb (61.2 kg)   02/09/22 128 lb (58.1 kg)         Physical Exam  Constitutional:       General: She is not in acute distress. Appearance: She is well-developed. She is not diaphoretic. HENT:      Head: Normocephalic and atraumatic. Nose: Nose normal.   Eyes:      Pupils: Pupils are equal, round, and reactive to light. Neck:      Thyroid: No thyromegaly. Vascular: No JVD. Trachea: No tracheal deviation. Cardiovascular:      Rate and Rhythm: Normal rate and regular rhythm. Heart sounds: No murmur heard. No friction rub. No gallop. Pulmonary:      Effort: No respiratory distress. Breath sounds: No wheezing or rales. Comments: diminished Breath sound bilaterally. Chest:      Chest wall: No tenderness. Abdominal:      General: There is no distension. Tenderness: There is no abdominal tenderness. There is no rebound. Musculoskeletal:         General: Normal range of motion. Lymphadenopathy:      Cervical: No cervical adenopathy. Skin:     General: Skin is warm and dry. Neurological:      Mental Status: She is alert and oriented to person, place, and time.       Coordination: Coordination normal.   Psychiatric:         Mood and Affect: Mood normal.         Behavior: Behavior normal.       Current Outpatient Medications   Medication Sig Dispense Refill    albuterol sulfate HFA (VENTOLIN HFA) 108 (90 Base) MCG/ACT inhaler Inhale 2 puffs into the lungs 4 times daily as needed for Wheezing 3 each 1    Budeson-Glycopyrrol-Formoterol 160-9-4.8 MCG/ACT AERO Inhale 2 puffs into the lungs in the morning and at bedtime 2 puff twice a day. 3 each 1    telmisartan (MICARDIS) 40 MG tablet Take 1 tablet by mouth daily 30 tablet 0     No current facility-administered medications for this visit. Results for orders placed during the hospital encounter of 02/09/22    XR CHEST (2 VW)    Narrative  CHEST X-RAY. TECHNIQUE:  PA and lateral views of the chest.    CLINICAL INDICATION:  79-year-old female presenting with history of COPD and asthma. COMPARISON:  None. PROCEDURE AND FINDINGS:  The cardiomediastinal silhouette is unremarkable. Biapical prominence consistent with the patient's diagnosis of COPD. Increased bilateral lung volumes is seen. The bronchovascular markings are unremarkable bilaterally. The costophrenic angles are clear, no evidence of lung infiltrate, pleural effusion or parenchymal lung mass. Mild levoscoliosis of the thoracic spine with compensatory dextroscoliosis of the thoracolumbar junction. Degenerative bony changes are seen. Bone demineralization seen. Impression  COPD changes, no evidence of acute cardiopulmonary disease is seen. Assessment/Plan:     1. Moderate persistent asthma without complication  She had CXR show COPD , alpha 1 antitrypsin is normal range. PFT show moderate COPD. She is on breztri HFA  2 puff BID . Albuterol HFA prn .  C/o shortness of breath  with exertion. She has cough , chest tightness when she has goes  from warm to cold weather. No wheezing since using breztri HFA. She is currently using albuterol HFA as needed basis. 2. Chronic obstructive pulmonary disease, unspecified COPD type ([address]  Continue bronchodilator therapy as before.     - albuterol sulfate HFA (VENTOLIN HFA) 108 (90 Base) MCG/ACT inhaler; Inhale 2 puffs into the lungs 4 times daily as needed for Wheezing  Dispense: 3 each; Refill: 1  - Budeson-Glycopyrrol-Formoterol 160-9-4.8 MCG/ACT AERO; Inhale 2 puffs into the lungs in the morning and at bedtime 2 puff twice a day. Dispense: 3 each; Refill: 1      Return in about 4 months (around 11/14/2022) for asthma, COPD.       Mary Camarena MD

## 2022-08-09 DIAGNOSIS — J44.9 CHRONIC OBSTRUCTIVE PULMONARY DISEASE, UNSPECIFIED COPD TYPE (HCC): ICD-10-CM

## 2022-08-09 NOTE — TELEPHONE ENCOUNTER
Please approve or deny this request.    Rx requested:  Requested Prescriptions     Pending Prescriptions Disp Refills    Budeson-Glycopyrrol-Formoterol 160-9-4.8 MCG/ACT AERO 3 each 1     Sig: Inhale 2 puffs into the lungs in the morning and at bedtime 2 puff twice a day.          Last Office Visit:   7/14/2022      Next Visit Date:  Future Appointments   Date Time Provider Lily Kolb   11/15/2022  9:45 AM Arnaud Zambrano MD [address] [place] [address]

## 2022-11-15 ENCOUNTER — OFFICE VISIT (OUTPATIENT)
Dept: PULMONOLOGY | Age: 62
End: 2022-11-15
Payer: COMMERCIAL

## 2022-11-15 VITALS
OXYGEN SATURATION: 98 % | WEIGHT: 135 LBS | BODY MASS INDEX: 23.17 KG/M2 | SYSTOLIC BLOOD PRESSURE: 118 MMHG | HEART RATE: 76 BPM | DIASTOLIC BLOOD PRESSURE: 80 MMHG

## 2022-11-15 DIAGNOSIS — J44.9 CHRONIC OBSTRUCTIVE PULMONARY DISEASE, UNSPECIFIED COPD TYPE (HCC): ICD-10-CM

## 2022-11-15 DIAGNOSIS — J45.40 MODERATE PERSISTENT ASTHMA WITHOUT COMPLICATION: Primary | ICD-10-CM

## 2022-11-15 PROCEDURE — 3078F DIAST BP <80 MM HG: CPT | Performed by: INTERNAL MEDICINE

## 2022-11-15 PROCEDURE — 99214 OFFICE O/P EST MOD 30 MIN: CPT | Performed by: INTERNAL MEDICINE

## 2022-11-15 PROCEDURE — 3074F SYST BP LT 130 MM HG: CPT | Performed by: INTERNAL MEDICINE

## 2022-11-15 RX ORDER — ALBUTEROL SULFATE 90 UG/1
2 AEROSOL, METERED RESPIRATORY (INHALATION) 4 TIMES DAILY PRN
Qty: 3 EACH | Refills: 1 | Status: SHIPPED | OUTPATIENT
Start: 2022-11-15 | End: 2022-12-30

## 2022-11-15 RX ORDER — OMEPRAZOLE 20 MG/1
CAPSULE, DELAYED RELEASE ORAL
COMMUNITY
Start: 2022-10-14

## 2022-11-15 ASSESSMENT — ENCOUNTER SYMPTOMS
RHINORRHEA: 0
NAUSEA: 0
SHORTNESS OF BREATH: 1
EYE DISCHARGE: 0
EYE ITCHING: 0
WHEEZING: 0
VOICE CHANGE: 0
COUGH: 0
TROUBLE SWALLOWING: 0
VOMITING: 0
DIARRHEA: 0
SORE THROAT: 0
ABDOMINAL PAIN: 0
CHEST TIGHTNESS: 0
SINUS PRESSURE: 0

## 2022-11-15 NOTE — PROGRESS NOTES
Subjective:     Matt Harmon is a 63 y.o. female who complains today of:     Chief Complaint   Patient presents with    Follow-up     4m f/u     COPD       HPI  She is on breztri HFA  2 puff BID . Albuterol HFA prn .  C/o shortness of breath  with exertion. She has no cough or chest tightness   No wheezing since using Breztri HFA   She is currently using albuterol HFA as needed basis. No Chest pain with radiation  or pleuritic pain. No Hemoptysis. No  leg edema. No orthopnea. No Fever or chills. No Rhinorrhea and postnasal drip. Alpha 1 antitrypsin is normal range. She had CXR show COPD , PFT show moderate COPD.        Allergies:  Lisinopril, Molds & smuts, Milk-related compounds, Pollen extract, and Penicillins  Past Medical History:   Diagnosis Date    Arthritis of both knees     Arthritis of right hand     Fibroadenoma of breast, right      Past Surgical History:   Procedure Laterality Date    BREAST ENHANCEMENT SURGERY      BREAST FIBROADENOMA SURGERY       SECTION      PARTIAL HYSTERECTOMY (CERVIX NOT REMOVED)      TONSILLECTOMY      WISDOM TOOTH EXTRACTION       Family History   Problem Relation Age of Onset    Cancer Mother         lung    High Blood Pressure Mother     High Blood Pressure Father     Diabetes Neg Hx     Kidney Disease Neg Hx     Stroke Neg Hx      Social History     Socioeconomic History    Marital status:      Spouse name: Not on file    Number of children: Not on file    Years of education: Not on file    Highest education level: Not on file   Occupational History    Not on file   Tobacco Use    Smoking status: Never    Smokeless tobacco: Never   Substance and Sexual Activity    Alcohol use: Not on file    Drug use: Not on file    Sexual activity: Not on file   Other Topics Concern    Not on file   Social History Narrative    Not on file     Social Determinants of Health     Financial Resource Strain: Low Risk     Difficulty of Paying Living Expenses: Not hard at all   Food Insecurity: No Food Insecurity    Worried About Running Out of Food in the Last Year: Never true    Ran Out of Food in the Last Year: Never true   Transportation Needs: Not on file   Physical Activity: Not on file   Stress: Not on file   Social Connections: Not on file   Intimate Partner Violence: Not on file   Housing Stability: Not on file         Review of Systems   Constitutional:  Negative for chills, diaphoresis, fatigue and fever. HENT:  Negative for congestion, mouth sores, nosebleeds, postnasal drip, rhinorrhea, sinus pressure, sneezing, sore throat, trouble swallowing and voice change. Eyes:  Negative for discharge, itching and visual disturbance. Respiratory:  Positive for shortness of breath. Negative for cough, chest tightness and wheezing. Cardiovascular:  Negative for chest pain, palpitations and leg swelling. Gastrointestinal:  Negative for abdominal pain, diarrhea, nausea and vomiting. Genitourinary:  Negative for difficulty urinating and hematuria. Musculoskeletal:  Negative for arthralgias, joint swelling and myalgias. Skin:  Negative for rash. Allergic/Immunologic: Negative for environmental allergies and food allergies. Neurological:  Negative for dizziness, tremors, weakness and headaches. Psychiatric/Behavioral:  Negative for behavioral problems and sleep disturbance.        :     Vitals:    11/15/22 0947   BP: 118/80   Site: Right Upper Arm   Position: Sitting   Cuff Size: Medium Adult   Pulse: 76   SpO2: 98%   Weight: 135 lb (61.2 kg)     Wt Readings from Last 3 Encounters:   11/15/22 135 lb (61.2 kg)   07/14/22 136 lb (61.7 kg)   03/11/22 135 lb (61.2 kg)         Physical Exam  Constitutional:       General: She is not in acute distress. Appearance: She is well-developed. She is not diaphoretic. HENT:      Head: Normocephalic and atraumatic. Nose: Nose normal.   Eyes:      Pupils: Pupils are equal, round, and reactive to light.    Neck: Thyroid: No thyromegaly. Vascular: No JVD. Trachea: No tracheal deviation. Cardiovascular:      Rate and Rhythm: Normal rate and regular rhythm. Heart sounds: No murmur heard. No friction rub. No gallop. Pulmonary:      Effort: No respiratory distress. Breath sounds: No wheezing or rales. Comments: diminished Breath sound bilaterally. Chest:      Chest wall: No tenderness. Abdominal:      General: There is no distension. Tenderness: There is no abdominal tenderness. There is no rebound. Musculoskeletal:         General: Normal range of motion. Lymphadenopathy:      Cervical: No cervical adenopathy. Skin:     General: Skin is warm and dry. Neurological:      Mental Status: She is alert and oriented to person, place, and time. Coordination: Coordination normal.   Psychiatric:         Mood and Affect: Mood normal.         Behavior: Behavior normal.       Current Outpatient Medications   Medication Sig Dispense Refill    Budeson-Glycopyrrol-Formoterol 160-9-4.8 MCG/ACT AERO Inhale 2 puffs into the lungs in the morning and at bedtime 2 puff twice a day. 3 each 1    albuterol sulfate HFA (VENTOLIN HFA) 108 (90 Base) MCG/ACT inhaler Inhale 2 puffs into the lungs 4 times daily as needed for Wheezing 3 each 1    telmisartan (MICARDIS) 40 MG tablet Take 1 tablet by mouth daily 30 tablet 0    omeprazole (PRILOSEC) 20 MG delayed release capsule TAKE 1 CAPSULE BY MOUTH DAILY       No current facility-administered medications for this visit. Results for orders placed during the hospital encounter of 02/09/22    XR CHEST (2 VW)    Narrative  CHEST X-RAY. TECHNIQUE:  PA and lateral views of the chest.    CLINICAL INDICATION:  51-year-old female presenting with history of COPD and asthma. COMPARISON:  None. PROCEDURE AND FINDINGS:  The cardiomediastinal silhouette is unremarkable. Biapical prominence consistent with the patient's diagnosis of COPD. Increased bilateral lung volumes is seen. The bronchovascular markings are unremarkable bilaterally. The costophrenic angles are clear, no evidence of lung infiltrate, pleural effusion or parenchymal lung mass. Mild levoscoliosis of the thoracic spine with compensatory dextroscoliosis of the thoracolumbar junction. Degenerative bony changes are seen. Bone demineralization seen. Impression  COPD changes, no evidence of acute cardiopulmonary disease is seen. Assessment/Plan:     1. Moderate persistent asthma without complication  She is on breztri HFA  2 puff BID . Albuterol HFA prn .C/o shortness of breath  with exertion. She has no cough or chest tightness No wheezing since using Breztri HFA She is currently using albuterol HFA as needed basis. - Budeson-Glycopyrrol-Formoterol 160-9-4.8 MCG/ACT AERO; Inhale 2 puffs into the lungs in the morning and at bedtime 2 puff twice a day. Dispense: 3 each; Refill: 1  - albuterol sulfate HFA (VENTOLIN HFA) 108 (90 Base) MCG/ACT inhaler; Inhale 2 puffs into the lungs 4 times daily as needed for Wheezing  Dispense: 3 each; Refill: 1    2. Chronic obstructive pulmonary disease, unspecified COPD type ([address]  CXR and PFT before next visit , continue bronchodilator as before. Return in about 3 months (around 2/15/2023) for CXR result, pft result, asthma, COPD.       Sofía Guan MD

## 2022-12-29 ENCOUNTER — HOSPITAL ENCOUNTER (OUTPATIENT)
Dept: GENERAL RADIOLOGY | Age: 62
Discharge: HOME | End: 2022-12-31
Payer: COMMERCIAL

## 2022-12-29 ENCOUNTER — HOSPITAL ENCOUNTER (OUTPATIENT)
Dept: PULMONOLOGY | Age: 62
Discharge: HOME | End: 2022-12-29
Payer: COMMERCIAL

## 2022-12-29 DIAGNOSIS — J45.40 MODERATE PERSISTENT ASTHMA WITHOUT COMPLICATION: ICD-10-CM

## 2022-12-29 DIAGNOSIS — J44.9 CHRONIC OBSTRUCTIVE PULMONARY DISEASE, UNSPECIFIED COPD TYPE (HCC): ICD-10-CM

## 2022-12-29 PROCEDURE — 94060 EVALUATION OF WHEEZING: CPT

## 2022-12-29 PROCEDURE — 6360000002 HC RX W HCPCS: Performed by: INTERNAL MEDICINE

## 2022-12-29 PROCEDURE — 94726 PLETHYSMOGRAPHY LUNG VOLUMES: CPT

## 2022-12-29 PROCEDURE — 94729 DIFFUSING CAPACITY: CPT

## 2022-12-29 PROCEDURE — 71046 X-RAY EXAM CHEST 2 VIEWS: CPT

## 2022-12-29 RX ORDER — ALBUTEROL SULFATE 2.5 MG/3ML
2.5 SOLUTION RESPIRATORY (INHALATION) ONCE
Status: COMPLETED | OUTPATIENT
Start: 2022-12-29 | End: 2022-12-29

## 2022-12-29 RX ADMIN — ALBUTEROL SULFATE 2.5 MG: 2.5 SOLUTION RESPIRATORY (INHALATION) at 10:40

## 2023-01-02 PROBLEM — J44.9 CHRONIC OBSTRUCTIVE PULMONARY DISEASE (HCC): Status: ACTIVE | Noted: 2023-01-02

## 2023-01-02 NOTE — PROCEDURES
[address]                      [address], [address] [facility]                               PULMONARY FUNCTION    PATIENT NAME: Dash Santillan                    :        1960  MED REC NO:   [id number]                            ROOM:  ACCOUNT NO:   [de-identified]                           ADMIT DATE: 2022  PROVIDER:     Sb Valentin MD    DATE OF PROCEDURE:  2022    REASON FOR STUDY:  Shortness of breath, wheezing. INTERPRETATION:  FVC is 1.99, 60% of predicted. FEV1 is 1.74, 68% of  predicted. FEV1/FVC is 87%. FEF 25-75% is 1.87, 79% of predicted. Postbronchodilator study shows FVC is 2.23, 11% improvement; FEV1 is  1.89, 8% improvement. Lung volume study shows residual volume is 2.74,  138% of predicted. TLC is 4.60, 90% of predicted. RV to TLC ratio is  59.69, 151% of predicted. Diffusion capacity is 20.31, 91% of  predicted. Airway resistance is 2.53, 136% of predicted. SUMMARY:  FVC/FEV1 both decreased with normal ratio with mildly  decreased midflow suggest, there is possibility of obstruction at small  airway level. There is positive response to bronchodilator, most at  midflow level. Lung volume study suggests significant air trapping and  hyperinflation. Diffusion capacity is within normal range. Airway  resistance is increased. Clinical correlation is requested.         Deena Nicole MD    D: 2023 15:37:10       T: 2023 17:31:16     [email or web address]  Job#: [id number]     Doc#: [id number]    CC: Adequate: hears normal conversation without difficulty

## 2023-02-04 PROBLEM — I10 HYPERTENSION: Status: ACTIVE | Noted: 2023-02-04

## 2023-02-04 PROBLEM — M79.641 PAIN OF RIGHT HAND: Status: ACTIVE | Noted: 2023-02-04

## 2023-02-04 PROBLEM — G47.00 INSOMNIA: Status: ACTIVE | Noted: 2023-02-04

## 2023-02-04 PROBLEM — N13.30 HYDRONEPHROSIS: Status: ACTIVE | Noted: 2023-02-04

## 2023-02-04 PROBLEM — M25.641 STIFFNESS OF RIGHT HAND JOINT: Status: ACTIVE | Noted: 2023-02-04

## 2023-02-04 PROBLEM — M48.50XS: Status: ACTIVE | Noted: 2023-02-04

## 2023-02-04 PROBLEM — G89.18 POST-OP PAIN: Status: ACTIVE | Noted: 2023-02-04

## 2023-02-04 PROBLEM — K21.9 GERD (GASTROESOPHAGEAL REFLUX DISEASE): Status: ACTIVE | Noted: 2023-02-04

## 2023-02-04 PROBLEM — R25.2 LEG CRAMPS: Status: ACTIVE | Noted: 2023-02-04

## 2023-02-04 PROBLEM — E55.9 VITAMIN D DEFICIENCY: Status: ACTIVE | Noted: 2023-02-04

## 2023-02-04 PROBLEM — M79.89 SWELLING OF RIGHT HAND: Status: ACTIVE | Noted: 2023-02-04

## 2023-02-04 PROBLEM — J44.9 COPD (CHRONIC OBSTRUCTIVE PULMONARY DISEASE) (MULTI): Status: ACTIVE | Noted: 2023-02-04

## 2023-02-04 PROBLEM — R92.30 DENSE BREAST TISSUE ON MAMMOGRAM: Status: ACTIVE | Noted: 2023-02-04

## 2023-02-04 PROBLEM — M19.049 OSTEOARTHRITIS OF HAND: Status: ACTIVE | Noted: 2023-02-04

## 2023-02-04 PROBLEM — J44.89 ASTHMA WITH COPD (CHRONIC OBSTRUCTIVE PULMONARY DISEASE) (MULTI): Status: ACTIVE | Noted: 2023-02-04

## 2023-02-16 ENCOUNTER — OFFICE VISIT (OUTPATIENT)
Dept: PULMONOLOGY | Age: 63
End: 2023-02-16
Payer: COMMERCIAL

## 2023-02-16 VITALS
HEART RATE: 75 BPM | DIASTOLIC BLOOD PRESSURE: 72 MMHG | BODY MASS INDEX: 22.49 KG/M2 | SYSTOLIC BLOOD PRESSURE: 118 MMHG | OXYGEN SATURATION: 98 % | WEIGHT: 131 LBS

## 2023-02-16 DIAGNOSIS — J44.9 CHRONIC OBSTRUCTIVE PULMONARY DISEASE, UNSPECIFIED COPD TYPE (HCC): Primary | ICD-10-CM

## 2023-02-16 DIAGNOSIS — J45.40 MODERATE PERSISTENT ASTHMA WITHOUT COMPLICATION: ICD-10-CM

## 2023-02-16 DIAGNOSIS — K21.9 GASTROESOPHAGEAL REFLUX DISEASE WITHOUT ESOPHAGITIS: ICD-10-CM

## 2023-02-16 PROCEDURE — 99214 OFFICE O/P EST MOD 30 MIN: CPT | Performed by: INTERNAL MEDICINE

## 2023-02-16 PROCEDURE — 3074F SYST BP LT 130 MM HG: CPT | Performed by: INTERNAL MEDICINE

## 2023-02-16 PROCEDURE — 3078F DIAST BP <80 MM HG: CPT | Performed by: INTERNAL MEDICINE

## 2023-02-16 RX ORDER — ALBUTEROL SULFATE 90 UG/1
2 AEROSOL, METERED RESPIRATORY (INHALATION) 4 TIMES DAILY PRN
Qty: 3 EACH | Refills: 1 | Status: SHIPPED | OUTPATIENT
Start: 2023-02-16 | End: 2023-02-16

## 2023-02-16 RX ORDER — ALBUTEROL SULFATE 90 UG/1
2 AEROSOL, METERED RESPIRATORY (INHALATION) 4 TIMES DAILY PRN
Qty: 3 EACH | Refills: 1 | Status: SHIPPED | OUTPATIENT
Start: 2023-02-16 | End: 2023-05-17

## 2023-02-16 RX ORDER — CYCLOSPORINE 0.5 MG/ML
EMULSION OPHTHALMIC
COMMUNITY
Start: 2022-12-12

## 2023-02-16 ASSESSMENT — ENCOUNTER SYMPTOMS
COUGH: 0
EYE DISCHARGE: 0
NAUSEA: 0
VOMITING: 0
CHEST TIGHTNESS: 0
ABDOMINAL PAIN: 0
VOICE CHANGE: 0
SINUS PRESSURE: 0
TROUBLE SWALLOWING: 0
WHEEZING: 0
EYE ITCHING: 0
SHORTNESS OF BREATH: 1
SORE THROAT: 0
RHINORRHEA: 0
DIARRHEA: 0

## 2023-02-16 NOTE — PROGRESS NOTES
Subjective:     Dana Martino is a 63 y.o. female who complains today of:     Chief Complaint   Patient presents with    Follow-up     3m f/u for asthma, COPD       HPI  She is on breztri HFA  2 puff BID. Albuterol HFA prn .  C/o shortness of breath  with exertion. She has no cough or chest tightness   No wheezing since using Breztri HFA   No Chest pain with radiation  or pleuritic pain. No Hemoptysis. No  leg edema. No orthopnea. No Fever or chills. No Rhinorrhea and postnasal drip. She had CXR show COPD , PFT show moderate COPD. Alpha 1 antitrypsin is normal range.       Allergies:  Lisinopril, Molds & smuts, Milk-related compounds, Pollen extract, and Penicillins  Past Medical History:   Diagnosis Date    Arthritis of both knees     Arthritis of right hand     Fibroadenoma of breast, right      Past Surgical History:   Procedure Laterality Date    BREAST ENHANCEMENT SURGERY      BREAST FIBROADENOMA SURGERY       SECTION      PARTIAL HYSTERECTOMY (CERVIX NOT REMOVED)      TONSILLECTOMY      WISDOM TOOTH EXTRACTION       Family History   Problem Relation Age of Onset    Cancer Mother         lung    High Blood Pressure Mother     High Blood Pressure Father     Diabetes Neg Hx     Kidney Disease Neg Hx     Stroke Neg Hx      Social History     Socioeconomic History    Marital status:      Spouse name: Not on file    Number of children: Not on file    Years of education: Not on file    Highest education level: Not on file   Occupational History    Not on file   Tobacco Use    Smoking status: Never    Smokeless tobacco: Never   Substance and Sexual Activity    Alcohol use: Not on file    Drug use: Not on file    Sexual activity: Not on file   Other Topics Concern    Not on file   Social History Narrative    Not on file     Social Determinants of Health     Financial Resource Strain: Not on file   Food Insecurity: Not on file   Transportation Needs: Not on file   Physical Activity: Not on file   Stress: Not on file   Social Connections: Not on file   Intimate Partner Violence: Not on file   Housing Stability: Not on file         Review of Systems   Constitutional:  Negative for chills, diaphoresis, fatigue and fever. HENT:  Negative for congestion, mouth sores, nosebleeds, postnasal drip, rhinorrhea, sinus pressure, sneezing, sore throat, trouble swallowing and voice change. Eyes:  Negative for discharge, itching and visual disturbance. Respiratory:  Positive for shortness of breath. Negative for cough, chest tightness and wheezing. Cardiovascular:  Negative for chest pain, palpitations and leg swelling. Gastrointestinal:  Negative for abdominal pain, diarrhea, nausea and vomiting. Genitourinary:  Negative for difficulty urinating and hematuria. Musculoskeletal:  Negative for arthralgias, joint swelling and myalgias. Skin:  Negative for rash. Allergic/Immunologic: Negative for environmental allergies and food allergies. Neurological:  Negative for dizziness, tremors, weakness and headaches. Psychiatric/Behavioral:  Negative for behavioral problems and sleep disturbance.        :     Vitals:    02/16/23 0956   BP: 118/72   Site: Right Upper Arm   Position: Sitting   Cuff Size: Medium Adult   Pulse: 75   SpO2: 98%   Weight: 131 lb (59.4 kg)     Wt Readings from Last 3 Encounters:   02/16/23 131 lb (59.4 kg)   11/15/22 135 lb (61.2 kg)   07/14/22 136 lb (61.7 kg)         Physical Exam  Constitutional:       General: She is not in acute distress. Appearance: She is well-developed. She is not diaphoretic. HENT:      Head: Normocephalic and atraumatic. Nose: Nose normal.   Eyes:      Pupils: Pupils are equal, round, and reactive to light. Neck:      Thyroid: No thyromegaly. Vascular: No JVD. Trachea: No tracheal deviation. Cardiovascular:      Rate and Rhythm: Normal rate and regular rhythm. Heart sounds: No murmur heard. No friction rub.  No gallop. Pulmonary:      Effort: No respiratory distress. Breath sounds: No wheezing or rales. Comments: diminished Breath sound bilaterally. Chest:      Chest wall: No tenderness. Abdominal:      General: There is no distension. Tenderness: There is no abdominal tenderness. There is no rebound. Musculoskeletal:         General: Normal range of motion. Lymphadenopathy:      Cervical: No cervical adenopathy. Skin:     General: Skin is warm and dry. Neurological:      Mental Status: She is alert and oriented to person, place, and time. Coordination: Coordination normal.   Psychiatric:         Mood and Affect: Mood normal.         Behavior: Behavior normal.       Current Outpatient Medications   Medication Sig Dispense Refill    cycloSPORINE (RESTASIS) 0.05 % ophthalmic emulsion Instill 1 drop into Both Eyes twice a day as directed      albuterol sulfate HFA (VENTOLIN HFA) 108 (90 Base) MCG/ACT inhaler Inhale 2 puffs into the lungs 4 times daily as needed for Wheezing 3 each 1    omeprazole (PRILOSEC) 20 MG delayed release capsule TAKE 1 CAPSULE BY MOUTH DAILY      Budeson-Glycopyrrol-Formoterol 160-9-4.8 MCG/ACT AERO Inhale 2 puffs into the lungs in the morning and at bedtime 2 puff twice a day. 3 each 1    telmisartan (MICARDIS) 40 MG tablet Take 1 tablet by mouth daily 30 tablet 0     No current facility-administered medications for this visit. Results for orders placed during the hospital encounter of 12/29/22    XR CHEST (2 VW)    Narrative  EXAMINATION:  TWO XRAY VIEWS OF THE CHEST    12/29/2022 11:06 am    COMPARISON:  None. HISTORY:  ORDERING SYSTEM PROVIDED HISTORY: Chronic obstructive pulmonary disease,  unspecified COPD type ([address] Moderate persistent asthma without complication  TECHNOLOGIST PROVIDED HISTORY:  What reading provider will be dictating this exam?->CRC    FINDINGS:  The lungs are without acute focal process. There is no effusion or  pneumothorax.  The cardiomediastinal silhouette is without acute process. The  osseous structures are without acute process. Impression  No acute process. COPD. Results for orders placed during the hospital encounter of 02/09/22    XR CHEST (2 VW)    Narrative  CHEST X-RAY. TECHNIQUE:  PA and lateral views of the chest.    CLINICAL INDICATION:  51-year-old female presenting with history of COPD and asthma. COMPARISON:  None. PROCEDURE AND FINDINGS:  The cardiomediastinal silhouette is unremarkable. Biapical prominence consistent with the patient's diagnosis of COPD. Increased bilateral lung volumes is seen. The bronchovascular markings are unremarkable bilaterally. The costophrenic angles are clear, no evidence of lung infiltrate, pleural effusion or parenchymal lung mass. Mild levoscoliosis of the thoracic spine with compensatory dextroscoliosis of the thoracolumbar junction. Degenerative bony changes are seen. Bone demineralization seen. Impression  COPD changes, no evidence of acute cardiopulmonary disease is seen. Assessment/Plan:     1. Chronic obstructive pulmonary disease, unspecified COPD type ([address]  She is on breztri HFA  2 puff BID. Albuterol HFA prn .C/o shortness of breath  with exertion. She has no cough or chest tightness No wheezing since using [facility]     She had CXR show COPD , PFT show moderate COPD. Alpha 1 antitrypsin is normal range. - albuterol sulfate HFA (VENTOLIN HFA) 108 (90 Base) MCG/ACT inhaler; Inhale 2 puffs into the lungs 4 times daily as needed for Wheezing  Dispense: 3 each; Refill: 1    2. Moderate persistent asthma without complication   continue broncho dilatortherapy as before    3. Gastroesophageal reflux disease without esophagitis  On omeprazole continue same    Return in about 5 months (around 7/5/2023).       Sulaiman Marion MD

## 2023-03-20 ENCOUNTER — OFFICE VISIT (OUTPATIENT)
Dept: PRIMARY CARE | Facility: CLINIC | Age: 63
End: 2023-03-20
Payer: COMMERCIAL

## 2023-03-20 VITALS
DIASTOLIC BLOOD PRESSURE: 78 MMHG | SYSTOLIC BLOOD PRESSURE: 120 MMHG | OXYGEN SATURATION: 98 % | HEIGHT: 62 IN | HEART RATE: 70 BPM | WEIGHT: 132 LBS | RESPIRATION RATE: 14 BRPM | BODY MASS INDEX: 24.29 KG/M2 | TEMPERATURE: 97.5 F

## 2023-03-20 DIAGNOSIS — M81.0 POSTMENOPAUSAL OSTEOPOROSIS: ICD-10-CM

## 2023-03-20 DIAGNOSIS — K21.00 GASTROESOPHAGEAL REFLUX DISEASE WITH ESOPHAGITIS WITHOUT HEMORRHAGE: ICD-10-CM

## 2023-03-20 DIAGNOSIS — M81.0 POSTMENOPAUSAL OSTEOPOROSIS: Primary | ICD-10-CM

## 2023-03-20 DIAGNOSIS — N13.30 HYDRONEPHROSIS, UNSPECIFIED HYDRONEPHROSIS TYPE: ICD-10-CM

## 2023-03-20 DIAGNOSIS — J44.89 ASTHMA WITH COPD (CHRONIC OBSTRUCTIVE PULMONARY DISEASE) (MULTI): ICD-10-CM

## 2023-03-20 PROBLEM — G89.18 POST-OP PAIN: Status: RESOLVED | Noted: 2023-02-04 | Resolved: 2023-03-20

## 2023-03-20 PROBLEM — M79.641 PAIN OF RIGHT HAND: Status: RESOLVED | Noted: 2023-02-04 | Resolved: 2023-03-20

## 2023-03-20 PROBLEM — M79.89 SWELLING OF RIGHT HAND: Status: RESOLVED | Noted: 2023-02-04 | Resolved: 2023-03-20

## 2023-03-20 PROBLEM — M19.049 OSTEOARTHRITIS OF HAND: Status: RESOLVED | Noted: 2023-02-04 | Resolved: 2023-03-20

## 2023-03-20 PROBLEM — M25.641 STIFFNESS OF RIGHT HAND JOINT: Status: RESOLVED | Noted: 2023-02-04 | Resolved: 2023-03-20

## 2023-03-20 PROBLEM — R25.2 LEG CRAMPS: Status: RESOLVED | Noted: 2023-02-04 | Resolved: 2023-03-20

## 2023-03-20 PROBLEM — M48.50XS: Status: RESOLVED | Noted: 2023-02-04 | Resolved: 2023-03-20

## 2023-03-20 PROCEDURE — 3078F DIAST BP <80 MM HG: CPT | Performed by: INTERNAL MEDICINE

## 2023-03-20 PROCEDURE — 3074F SYST BP LT 130 MM HG: CPT | Performed by: INTERNAL MEDICINE

## 2023-03-20 PROCEDURE — 99214 OFFICE O/P EST MOD 30 MIN: CPT | Performed by: INTERNAL MEDICINE

## 2023-03-20 PROCEDURE — 1036F TOBACCO NON-USER: CPT | Performed by: INTERNAL MEDICINE

## 2023-03-20 RX ORDER — ALENDRONATE SODIUM 70 MG/1
70 TABLET ORAL
Qty: 4 TABLET | Refills: 11 | Status: SHIPPED | OUTPATIENT
Start: 2023-03-20 | End: 2023-03-20 | Stop reason: SDUPTHER

## 2023-03-20 RX ORDER — ALENDRONATE SODIUM 70 MG/1
70 TABLET ORAL
Qty: 12 TABLET | Refills: 3 | Status: SHIPPED | OUTPATIENT
Start: 2023-03-20 | End: 2023-09-25 | Stop reason: SDUPTHER

## 2023-03-20 RX ORDER — FERROUS SULFATE, DRIED 160(50) MG
1 TABLET, EXTENDED RELEASE ORAL 2 TIMES DAILY
COMMUNITY

## 2023-03-20 NOTE — PATIENT INSTRUCTIONS
Discussed risk vs benefit of alendronate.  Recommend she cont her protonix due to her severe gerd sx.   Take calcium and vit d   Start alendronate once a week.   She is hoarse on her inhaler. She will follow up  with pulmonary  Call  with any problems or questions.   Follow up in 3

## 2023-03-20 NOTE — ADDENDUM NOTE
Addended by: ESTEFANY ZHAO on: 3/20/2023 12:09 PM     Modules accepted: Orders, Level of Service

## 2023-03-20 NOTE — PROGRESS NOTES
"Subjective    Jesica Brown is a 62 y.o. female who presents for GERD.  HPI  6 month follow up. Had EGD/colonoscopy. Increased omeprazole 40mg to BID.     Discuss bone density results and omeprazole.   She has appt with pulmonary  and nephro.  She would like to  try fosamax.  She is on protonix twice a day      Review of Systems   All other systems reviewed and are negative.        Objective     /78 (BP Location: Left arm, Patient Position: Sitting, BP Cuff Size: Adult)   Pulse 70   Temp 36.4 °C (97.5 °F) (Skin)   Resp 14   Ht 1.575 m (5' 2\")   Wt 59.9 kg (132 lb)   SpO2 98%   BMI 24.14 kg/m²    Physical Exam  Vitals reviewed.   Constitutional:       General: She is not in acute distress.     Appearance: Normal appearance.   Cardiovascular:      Rate and Rhythm: Normal rate and regular rhythm.      Pulses: Normal pulses.      Heart sounds: Normal heart sounds.   Pulmonary:      Effort: Pulmonary effort is normal.      Breath sounds: Normal breath sounds.   Abdominal:      Tenderness: There is no abdominal tenderness.   Musculoskeletal:         General: No swelling.   Skin:     General: Skin is warm and dry.   Neurological:      Mental Status: She is alert.       Health Maintenance Due   Topic Date Due    Yearly Adult Physical  Never done    Lipid Panel  Never done    HIV Screening  Never done    Colorectal Cancer Screening  Never done    MMR Vaccines (1 of 1 - Standard series) Never done    Hepatitis C Screening  Never done    Cervical Cancer Screening  Never done    Zoster Vaccines (1 of 2) 11/23/2012    Pneumococcal Vaccine: Pediatrics (0 to 5 Years) and At-Risk Patients (6 to 64 Years) (2 - PCV) 10/15/2021    DTaP/Tdap/Td Vaccines (2 - Td or Tdap) 11/12/2021    Mammogram  02/02/2023          Assessment/Plan   Problem List Items Addressed This Visit          Respiratory    Asthma with COPD (chronic obstructive pulmonary disease) (CMS/[facility])     Other Visit Diagnoses       Postmenopausal osteoporosis   "  -  Primary    Relevant Medications    alendronate (Fosamax) 70 mg tablet

## 2023-04-13 LAB
ALBUMIN (MG/L) IN URINE: <7 MG/L
ALBUMIN/CREATININE (UG/MG) IN URINE: NORMAL UG/MG CRT (ref 0–30)
CREATININE (MG/DL) IN SER/PLAS: 0.8 MG/DL (ref 0.5–1.05)
CREATININE (MG/DL) IN URINE: 47.4 MG/DL (ref 20–320)
GFR FEMALE: 84 ML/MIN/1.73M2
UREA NITROGEN (MG/DL) IN SER/PLAS: 18 MG/DL (ref 6–23)

## 2023-04-14 DIAGNOSIS — I10 HYPERTENSION, UNSPECIFIED TYPE: ICD-10-CM

## 2023-04-14 RX ORDER — TELMISARTAN 40 MG/1
40 TABLET ORAL DAILY
Qty: 90 TABLET | Refills: 3 | Status: SHIPPED | OUTPATIENT
Start: 2023-04-14 | End: 2023-09-25 | Stop reason: SDUPTHER

## 2023-07-17 ENCOUNTER — OFFICE VISIT (OUTPATIENT)
Dept: PULMONOLOGY | Age: 63
End: 2023-07-17
Payer: COMMERCIAL

## 2023-07-17 VITALS
WEIGHT: 128 LBS | OXYGEN SATURATION: 98 % | SYSTOLIC BLOOD PRESSURE: 104 MMHG | TEMPERATURE: 97.1 F | BODY MASS INDEX: 21.97 KG/M2 | HEART RATE: 68 BPM | DIASTOLIC BLOOD PRESSURE: 80 MMHG

## 2023-07-17 DIAGNOSIS — J45.40 MODERATE PERSISTENT ASTHMA WITHOUT COMPLICATION: ICD-10-CM

## 2023-07-17 DIAGNOSIS — K21.9 GASTROESOPHAGEAL REFLUX DISEASE WITHOUT ESOPHAGITIS: ICD-10-CM

## 2023-07-17 DIAGNOSIS — J44.9 CHRONIC OBSTRUCTIVE PULMONARY DISEASE, UNSPECIFIED COPD TYPE (HCC): Primary | ICD-10-CM

## 2023-07-17 PROCEDURE — 3078F DIAST BP <80 MM HG: CPT | Performed by: INTERNAL MEDICINE

## 2023-07-17 PROCEDURE — 99214 OFFICE O/P EST MOD 30 MIN: CPT | Performed by: INTERNAL MEDICINE

## 2023-07-17 PROCEDURE — 3074F SYST BP LT 130 MM HG: CPT | Performed by: INTERNAL MEDICINE

## 2023-07-17 RX ORDER — ALBUTEROL SULFATE 90 UG/1
2 AEROSOL, METERED RESPIRATORY (INHALATION) 4 TIMES DAILY PRN
Qty: 3 EACH | Refills: 1 | Status: SHIPPED | OUTPATIENT
Start: 2023-07-17 | End: 2023-10-15

## 2023-07-17 RX ORDER — TAMSULOSIN HYDROCHLORIDE 0.4 MG/1
CAPSULE ORAL
COMMUNITY
Start: 2023-06-21

## 2023-07-17 RX ORDER — ALENDRONATE SODIUM 70 MG/1
TABLET ORAL
COMMUNITY
Start: 2023-06-21

## 2023-07-17 ASSESSMENT — ENCOUNTER SYMPTOMS
VOMITING: 0
EYE DISCHARGE: 0
RHINORRHEA: 0
SINUS PRESSURE: 0
SORE THROAT: 0
TROUBLE SWALLOWING: 0
WHEEZING: 0
VOICE CHANGE: 0
NAUSEA: 0
ABDOMINAL PAIN: 0
SHORTNESS OF BREATH: 1
COUGH: 1
EYE ITCHING: 0
CHEST TIGHTNESS: 0
DIARRHEA: 0

## 2023-07-17 NOTE — PROGRESS NOTES
Appearance: She is well-developed. She is not diaphoretic. HENT:      Head: Normocephalic and atraumatic. Nose: Nose normal.   Eyes:      Pupils: Pupils are equal, round, and reactive to light. Neck:      Thyroid: No thyromegaly. Vascular: No JVD. Trachea: No tracheal deviation. Cardiovascular:      Rate and Rhythm: Normal rate and regular rhythm. Heart sounds: No murmur heard. No friction rub. No gallop. Pulmonary:      Effort: No respiratory distress. Breath sounds: No wheezing or rales. Comments: diminished Breath sound bilaterally. Chest:      Chest wall: No tenderness. Abdominal:      General: There is no distension. Tenderness: There is no abdominal tenderness. There is no rebound. Musculoskeletal:         General: Normal range of motion. Lymphadenopathy:      Cervical: No cervical adenopathy. Skin:     General: Skin is warm and dry. Neurological:      Mental Status: She is alert and oriented to person, place, and time. Coordination: Coordination normal.   Psychiatric:         Mood and Affect: Mood normal.         Behavior: Behavior normal.       Current Outpatient Medications   Medication Sig Dispense Refill    alendronate (FOSAMAX) 70 MG tablet TAKE 1 TABLET BY MOUTH ONCE A WEEK. take IN THE MORNING ON AN EMPTY STOMACH with a full glass OF water. do not take anything else BY MOUTH or lie down for the next 30 MINUTES      tamsulosin (FLOMAX) 0.4 MG capsule TAKE 1 CAPSULE BY MOUTH 30 minutes following the same meal DAILY      albuterol sulfate HFA (VENTOLIN HFA) 108 (90 Base) MCG/ACT inhaler Inhale 2 puffs into the lungs 4 times daily as needed for Wheezing 3 each 1    Budeson-Glycopyrrol-Formoterol 160-9-4.8 MCG/ACT AERO Inhale 2 puffs into the lungs in the morning and at bedtime 2 puff twice a day.  3 each 1    cycloSPORINE (RESTASIS) 0.05 % ophthalmic emulsion Instill 1 drop into Both Eyes twice a day as directed      omeprazole (PRILOSEC) 20

## 2023-09-25 ENCOUNTER — OFFICE VISIT (OUTPATIENT)
Dept: PRIMARY CARE | Facility: CLINIC | Age: 63
End: 2023-09-25
Payer: COMMERCIAL

## 2023-09-25 VITALS
HEART RATE: 62 BPM | SYSTOLIC BLOOD PRESSURE: 118 MMHG | TEMPERATURE: 97.4 F | DIASTOLIC BLOOD PRESSURE: 66 MMHG | HEIGHT: 62 IN | OXYGEN SATURATION: 98 % | RESPIRATION RATE: 18 BRPM | WEIGHT: 127 LBS | BODY MASS INDEX: 23.37 KG/M2

## 2023-09-25 DIAGNOSIS — Z12.11 COLON CANCER SCREENING: ICD-10-CM

## 2023-09-25 DIAGNOSIS — Z12.31 ENCOUNTER FOR SCREENING MAMMOGRAM FOR MALIGNANT NEOPLASM OF BREAST: ICD-10-CM

## 2023-09-25 DIAGNOSIS — K21.00 GASTROESOPHAGEAL REFLUX DISEASE WITH ESOPHAGITIS WITHOUT HEMORRHAGE: ICD-10-CM

## 2023-09-25 DIAGNOSIS — Z84.81 FAMILY HISTORY OF BRCA GENE MUTATION: ICD-10-CM

## 2023-09-25 DIAGNOSIS — K22.719 BARRETT'S ESOPHAGUS WITH DYSPLASIA: ICD-10-CM

## 2023-09-25 DIAGNOSIS — Z00.00 WELLNESS EXAMINATION: Primary | ICD-10-CM

## 2023-09-25 DIAGNOSIS — M81.0 POSTMENOPAUSAL OSTEOPOROSIS: ICD-10-CM

## 2023-09-25 DIAGNOSIS — R92.30 DENSE BREAST TISSUE ON MAMMOGRAM: ICD-10-CM

## 2023-09-25 DIAGNOSIS — J44.89 ASTHMA WITH COPD (CHRONIC OBSTRUCTIVE PULMONARY DISEASE) (MULTI): ICD-10-CM

## 2023-09-25 DIAGNOSIS — N13.30 HYDRONEPHROSIS, UNSPECIFIED HYDRONEPHROSIS TYPE: ICD-10-CM

## 2023-09-25 DIAGNOSIS — F51.01 PRIMARY INSOMNIA: ICD-10-CM

## 2023-09-25 DIAGNOSIS — I10 HYPERTENSION, UNSPECIFIED TYPE: ICD-10-CM

## 2023-09-25 PROCEDURE — 1036F TOBACCO NON-USER: CPT | Performed by: INTERNAL MEDICINE

## 2023-09-25 PROCEDURE — 90686 IIV4 VACC NO PRSV 0.5 ML IM: CPT | Performed by: INTERNAL MEDICINE

## 2023-09-25 PROCEDURE — 90471 IMMUNIZATION ADMIN: CPT | Performed by: INTERNAL MEDICINE

## 2023-09-25 PROCEDURE — 99396 PREV VISIT EST AGE 40-64: CPT | Performed by: INTERNAL MEDICINE

## 2023-09-25 PROCEDURE — 3074F SYST BP LT 130 MM HG: CPT | Performed by: INTERNAL MEDICINE

## 2023-09-25 PROCEDURE — 3078F DIAST BP <80 MM HG: CPT | Performed by: INTERNAL MEDICINE

## 2023-09-25 RX ORDER — BUDESONIDE, GLYCOPYRROLATE, AND FORMOTEROL FUMARATE 160; 9; 4.8 UG/1; UG/1; UG/1
2 AEROSOL, METERED RESPIRATORY (INHALATION) 2 TIMES DAILY
Qty: 72 G | Refills: 3 | Status: SHIPPED | OUTPATIENT
Start: 2023-09-25 | End: 2024-09-24

## 2023-09-25 RX ORDER — ALBUTEROL SULFATE 90 UG/1
2 AEROSOL, METERED RESPIRATORY (INHALATION) EVERY 6 HOURS PRN
Qty: 18 G | Refills: 3 | Status: SHIPPED | OUTPATIENT
Start: 2023-09-25 | End: 2024-05-06 | Stop reason: ALTCHOICE

## 2023-09-25 RX ORDER — TAMSULOSIN HYDROCHLORIDE 0.4 MG/1
0.4 CAPSULE ORAL DAILY
Qty: 90 CAPSULE | Refills: 3 | Status: SHIPPED | OUTPATIENT
Start: 2023-09-25 | End: 2024-09-24

## 2023-09-25 RX ORDER — TELMISARTAN 40 MG/1
40 TABLET ORAL DAILY
Qty: 90 TABLET | Refills: 3 | Status: SHIPPED | OUTPATIENT
Start: 2023-09-25 | End: 2024-09-24

## 2023-09-25 RX ORDER — TAMSULOSIN HYDROCHLORIDE 0.4 MG/1
0.4 CAPSULE ORAL DAILY
COMMUNITY
Start: 2023-04-25 | End: 2023-09-25 | Stop reason: SDUPTHER

## 2023-09-25 RX ORDER — OMEPRAZOLE 40 MG/1
40 CAPSULE, DELAYED RELEASE ORAL
Qty: 90 CAPSULE | Refills: 3 | Status: SHIPPED | OUTPATIENT
Start: 2023-09-25 | End: 2024-09-24

## 2023-09-25 RX ORDER — TRAZODONE HYDROCHLORIDE 50 MG/1
50 TABLET ORAL NIGHTLY
Qty: 90 TABLET | Refills: 3 | Status: SHIPPED | OUTPATIENT
Start: 2023-09-25 | End: 2024-09-24

## 2023-09-25 RX ORDER — ALENDRONATE SODIUM 70 MG/1
70 TABLET ORAL
Qty: 12 TABLET | Refills: 3 | Status: SHIPPED | OUTPATIENT
Start: 2023-09-25 | End: 2024-09-24

## 2023-09-25 NOTE — PROGRESS NOTES
"Subjective   Patient ID: Jesica Brown is a 62 y.o. female who presents for Annual Exam.    Her breathing is better.  Her pulmonary is improved.  She saw pulmonary  She had work up with pulm and found to have hydronephrosis. Her work up was negative  She is up to date on colonoscopy and egd           Review of Systems   All other systems reviewed and are negative.      Objective   /66   Pulse 62   Temp 36.3 °C (97.4 °F) (Temporal)   Resp 18   Ht 1.575 m (5' 2\")   Wt 57.6 kg (127 lb)   SpO2 98%   BMI 23.23 kg/m²     Physical Exam  Constitutional:       Appearance: Normal appearance.   Cardiovascular:      Rate and Rhythm: Normal rate and regular rhythm.      Pulses: Normal pulses.   Pulmonary:      Effort: Pulmonary effort is normal.      Breath sounds: Normal breath sounds.   Chest:      Chest wall: No mass or tenderness.   Breasts:     Right: Normal.      Left: Normal.   Abdominal:      General: Abdomen is flat.   Musculoskeletal:      Cervical back: Neck supple. No tenderness.   Lymphadenopathy:      Cervical: No cervical adenopathy.      Upper Body:      Right upper body: No supraclavicular or axillary adenopathy.      Left upper body: No supraclavicular or axillary adenopathy.   Neurological:      Mental Status: She is alert.   Psychiatric:         Attention and Perception: Attention and perception normal.         Mood and Affect: Mood and affect normal.         Assessment/Plan   Problem List Items Addressed This Visit       Asthma with COPD (chronic obstructive pulmonary disease) (CMS/HCC)    Dense breast tissue on mammogram    GERD (gastroesophageal reflux disease)     Other Visit Diagnoses       Wellness examination    -  Primary    Relevant Orders    CBC    Comprehensive Metabolic Panel    Lipid Panel    Postmenopausal osteoporosis        Bhatia's esophagus with dysplasia        Encounter for screening mammogram for malignant neoplasm of breast        Relevant Orders    BI mammo bilateral " screening tomosynthesis    Family history of BRCA gene mutation        Relevant Orders              Check labs, flu shot  Call  with any problems or questions.   Follow up in a year.

## 2023-11-13 ENCOUNTER — OFFICE VISIT (OUTPATIENT)
Dept: GASTROENTEROLOGY | Facility: CLINIC | Age: 63
End: 2023-11-13
Payer: COMMERCIAL

## 2023-11-13 ENCOUNTER — LAB (OUTPATIENT)
Dept: LAB | Facility: LAB | Age: 63
End: 2023-11-13
Payer: COMMERCIAL

## 2023-11-13 VITALS
HEIGHT: 62 IN | WEIGHT: 127.8 LBS | SYSTOLIC BLOOD PRESSURE: 111 MMHG | HEART RATE: 69 BPM | BODY MASS INDEX: 23.52 KG/M2 | DIASTOLIC BLOOD PRESSURE: 73 MMHG

## 2023-11-13 DIAGNOSIS — Z84.81 FAMILY HISTORY OF BRCA GENE MUTATION: ICD-10-CM

## 2023-11-13 DIAGNOSIS — Z86.010 PERSONAL HISTORY OF COLONIC POLYPS: ICD-10-CM

## 2023-11-13 DIAGNOSIS — Z00.00 WELLNESS EXAMINATION: ICD-10-CM

## 2023-11-13 DIAGNOSIS — K21.00 GASTROESOPHAGEAL REFLUX DISEASE WITH ESOPHAGITIS WITHOUT HEMORRHAGE: Primary | ICD-10-CM

## 2023-11-13 DIAGNOSIS — Z12.11 COLON CANCER SCREENING: ICD-10-CM

## 2023-11-13 PROBLEM — Z86.0100 PERSONAL HISTORY OF COLONIC POLYPS: Status: ACTIVE | Noted: 2023-11-13

## 2023-11-13 PROBLEM — K59.00 CONSTIPATION: Status: ACTIVE | Noted: 2023-11-13

## 2023-11-13 LAB
ALBUMIN SERPL BCP-MCNC: 4.1 G/DL (ref 3.4–5)
ALP SERPL-CCNC: 42 U/L (ref 33–136)
ALT SERPL W P-5'-P-CCNC: 11 U/L (ref 7–45)
ANION GAP SERPL CALC-SCNC: 8 MMOL/L (ref 10–20)
AST SERPL W P-5'-P-CCNC: 18 U/L (ref 9–39)
BILIRUB SERPL-MCNC: 0.4 MG/DL (ref 0–1.2)
BUN SERPL-MCNC: 15 MG/DL (ref 6–23)
CALCIUM SERPL-MCNC: 9 MG/DL (ref 8.6–10.3)
CANCER AG125 SERPL-ACNC: 6.7 U/ML (ref 0–30.2)
CHLORIDE SERPL-SCNC: 104 MMOL/L (ref 98–107)
CHOLEST SERPL-MCNC: 203 MG/DL (ref 0–199)
CHOLESTEROL/HDL RATIO: 2.8
CO2 SERPL-SCNC: 29 MMOL/L (ref 21–32)
CREAT SERPL-MCNC: 0.71 MG/DL (ref 0.5–1.05)
ERYTHROCYTE [DISTWIDTH] IN BLOOD BY AUTOMATED COUNT: 13.8 % (ref 11.5–14.5)
GFR SERPL CREATININE-BSD FRML MDRD: >90 ML/MIN/1.73M*2
GLUCOSE SERPL-MCNC: 93 MG/DL (ref 74–99)
HCT VFR BLD AUTO: 41.2 % (ref 36–46)
HDLC SERPL-MCNC: 72.5 MG/DL
HGB BLD-MCNC: 13.4 G/DL (ref 12–16)
LDLC SERPL CALC-MCNC: 119 MG/DL
MCH RBC QN AUTO: 30.6 PG (ref 26–34)
MCHC RBC AUTO-ENTMCNC: 32.5 G/DL (ref 32–36)
MCV RBC AUTO: 94 FL (ref 80–100)
NON HDL CHOLESTEROL: 131 MG/DL (ref 0–149)
NRBC BLD-RTO: 0 /100 WBCS (ref 0–0)
PLATELET # BLD AUTO: 241 X10*3/UL (ref 150–450)
POTASSIUM SERPL-SCNC: 4.4 MMOL/L (ref 3.5–5.3)
PROT SERPL-MCNC: 6.3 G/DL (ref 6.4–8.2)
RBC # BLD AUTO: 4.38 X10*6/UL (ref 4–5.2)
SODIUM SERPL-SCNC: 137 MMOL/L (ref 136–145)
TRIGL SERPL-MCNC: 60 MG/DL (ref 0–149)
VLDL: 12 MG/DL (ref 0–40)
WBC # BLD AUTO: 4.8 X10*3/UL (ref 4.4–11.3)

## 2023-11-13 PROCEDURE — 36415 COLL VENOUS BLD VENIPUNCTURE: CPT

## 2023-11-13 PROCEDURE — 80053 COMPREHEN METABOLIC PANEL: CPT

## 2023-11-13 PROCEDURE — 3078F DIAST BP <80 MM HG: CPT | Performed by: PHYSICIAN ASSISTANT

## 2023-11-13 PROCEDURE — 86304 IMMUNOASSAY TUMOR CA 125: CPT

## 2023-11-13 PROCEDURE — 3074F SYST BP LT 130 MM HG: CPT | Performed by: PHYSICIAN ASSISTANT

## 2023-11-13 PROCEDURE — 1036F TOBACCO NON-USER: CPT | Performed by: PHYSICIAN ASSISTANT

## 2023-11-13 PROCEDURE — 85027 COMPLETE CBC AUTOMATED: CPT

## 2023-11-13 PROCEDURE — 99214 OFFICE O/P EST MOD 30 MIN: CPT | Performed by: PHYSICIAN ASSISTANT

## 2023-11-13 PROCEDURE — 80061 LIPID PANEL: CPT

## 2023-11-13 RX ORDER — FAMOTIDINE 40 MG/1
40 TABLET, FILM COATED ORAL NIGHTLY
Qty: 30 TABLET | Refills: 11 | Status: SHIPPED | OUTPATIENT
Start: 2023-11-13 | End: 2024-05-06 | Stop reason: ALTCHOICE

## 2023-11-13 RX ORDER — SOD SULF/POT CHLORIDE/MAG SULF 1.479 G
24 TABLET ORAL ONCE
Qty: 24 TABLET | Refills: 0 | Status: SHIPPED | OUTPATIENT
Start: 2023-11-13 | End: 2023-11-13

## 2023-11-13 ASSESSMENT — ENCOUNTER SYMPTOMS
UNEXPECTED WEIGHT CHANGE: 0
DIZZINESS: 0
EYE PAIN: 0
APPETITE CHANGE: 0
HEMATURIA: 0
CHILLS: 0
DYSURIA: 0
WHEEZING: 0
HEADACHES: 0
MYALGIAS: 0
SORE THROAT: 0
FEVER: 0
TROUBLE SWALLOWING: 0
ARTHRALGIAS: 0
JOINT SWELLING: 0
CONFUSION: 1
NUMBNESS: 0
COUGH: 0
SHORTNESS OF BREATH: 1
WEAKNESS: 0

## 2023-11-13 NOTE — PROGRESS NOTES
Gastroenterology Office Visit     History of Present Illness:   Jesica Brown is a 62 y.o. female who presents to GI clinic for acid reflux.    She stopped taking her omeprazole 40 mg daily last month after breaking her ribs. She denies any symptoms of acid reflux. She will get abdominal pain after eating too much. She denies dysphagia or odynophagia. She has history of constipation and has for awhile. She is taking a probiotic to help with her bowels. She typically moves her bowels every day to every other day. She occasionally strains to have a a bowel movements 2-3 time a week. She will pass hard pelts rarely and usually occurs once a month and typically is worse with stress. She denies diarrhea, melena,or hematochezia.    Last colonoscopy: 2/2023  Last endosopy: 2/2023    Review of Systems  Review of Systems   Constitutional:  Negative for appetite change, chills, fever and unexpected weight change.   HENT:  Negative for mouth sores, sore throat and trouble swallowing.    Eyes:  Negative for pain and visual disturbance.   Respiratory:  Positive for shortness of breath (rarely). Negative for cough and wheezing.    Cardiovascular:  Negative for chest pain.   Genitourinary:  Negative for decreased urine volume, dysuria and hematuria.   Musculoskeletal:  Negative for arthralgias, joint swelling and myalgias.   Skin:  Negative for pallor and rash.   Neurological:  Negative for dizziness, weakness, numbness and headaches.   Psychiatric/Behavioral:  Positive for confusion.        Past Medical History   has a past medical history of Personal history of other diseases of the circulatory system (03/18/2022) and Personal history of other diseases of the respiratory system (03/18/2022).     Past Surgical History  Past Surgical History:   Procedure Laterality Date    OTHER SURGICAL HISTORY  03/18/2022    [place] tooth extraction    OTHER SURGICAL HISTORY  03/18/2022    Hysterectomy    OTHER SURGICAL HISTORY  03/18/2022     Breast augmentation    OTHER SURGICAL HISTORY  2022    Tonsillectomy with adenoidectomy    OTHER SURGICAL HISTORY  2022     section    OTHER SURGICAL HISTORY  2022    Breast biopsy       Social History   reports that she has never smoked. She has never used smokeless tobacco. She reports current alcohol use of about 7.0 standard drinks of alcohol per week. She reports that she does not use drugs.     Family History  family history includes BRCA in her paternal cousin; Lung cancer (age of onset: 65 - 69) in her mother.   No family history of stomach. He had 5 or 7 uncles and aunts on dads side who had colon cancer    Allergies  Allergies   Allergen Reactions    Lactose Unknown     Intolerance    Lisinopril Cough    Penicillins Unknown       Medications  Current Outpatient Medications   Medication Instructions    albuterol 90 mcg/actuation inhaler 2 puffs, inhalation, Every 6 hours PRN    alendronate (FOSAMAX) 70 mg, oral, Every 7 days, Take in the morning with a full glass of water, on an empty stomach, and do not take anything else by mouth or lie down for the next 30 min.    budesonide-glycopyr-formoterol (Breztri Aerosphere) 160-9-4.8 mcg/actuation HFA aerosol inhaler 2 puffs, inhalation, 2 times daily    calcium carbonate-vitamin D3 500 mg-5 mcg (200 unit) tablet 1 tablet, oral, 2 times daily    cycloSPORINE 0.05 % drops 1 drop, Both Eyes, 2 times daily    famotidine (PEPCID) 40 mg, oral, Nightly    Lactobacillus acidophilus (PROBIOTIC ORAL) No dose, route, or frequency recorded.    montelukast (SINGULAIR) 10 mg, oral, Nightly    multivit-min/ferrous fumarate (MULTI VITAMIN ORAL) Take 1 tablet daily    omeprazole (PRILOSEC) 40 mg, oral, Daily before breakfast, Do not crush or chew.    sod sulf-pot chloride-mag sulf (Sutab) 1.479-0.188- 0.225 gram tablet 24 tablets, oral, Once    tamsulosin (FLOMAX) 0.4 mg, oral, Daily    telmisartan (MICARDIS) 40 mg, oral, Daily    traZODone (DESYREL) 50  "mg, oral, Nightly        Objective   Visit Vitals  /73   Pulse 69        Physical Exam  Constitutional:       General: She is not in acute distress.     Appearance: Normal appearance.   HENT:      Mouth/Throat:      Mouth: Mucous membranes are moist.      Pharynx: Oropharynx is clear.   Eyes:      General: No scleral icterus.     Extraocular Movements: Extraocular movements intact.      Conjunctiva/sclera: Conjunctivae normal.      Pupils: Pupils are equal, round, and reactive to light.   Cardiovascular:      Rate and Rhythm: Normal rate and regular rhythm.      Heart sounds: No murmur heard.  Pulmonary:      Effort: Pulmonary effort is normal.      Breath sounds: No wheezing or rhonchi.   Abdominal:      General: Bowel sounds are normal. There is no distension.      Palpations: Abdomen is soft. There is no mass.      Tenderness: There is no abdominal tenderness.      Hernia: No hernia is present.   Musculoskeletal:         General: No swelling or deformity.   Skin:     General: Skin is warm.      Coloration: Skin is not jaundiced.   Neurological:      General: No focal deficit present.      Mental Status: She is alert and oriented to person, place, and time.   Psychiatric:         Mood and Affect: Mood normal.         LABS  Pertinent labs were reviewed with the patient  No results found for: \"WBC\", \"HGB\", \"HCT\", \"PLT\"   Lab Results   Component Value Date     09/19/2022    K 4.0 09/19/2022     09/19/2022    CO2 30 09/19/2022    BUN 18 04/13/2023    CREATININE 0.80 04/13/2023    GLUCOSE 88 09/19/2022    CALCIUM 9.3 09/19/2022      Lab Results   Component Value Date    ALKPHOS 62 09/19/2022    BILITOT 0.4 09/19/2022    PROT 6.9 09/19/2022    ALT 16 09/19/2022    AST 21 09/19/2022      No results found for: \"INR\"   No results found for: \"IRON\", \"TIBC\"   No results found for: \"FERRITIN\"   No results found for: \"TSH\", \"T3FREE\", \"FREET4\"     Radiology  Pertinent radiology was reviewed with the " patient  CT Abdomen pelvis WO Contrast 2/20/2023  IMPRESSION:  Mild right hydronephrosis of uncertain significance. A contrasted  exam may be useful.     Suggestion of tiny gallstones.     The abdomen and pelvis are otherwise unremarkable.    Endoscopy  Colonoscopy by Dr Bernard 2/23/2023  Indication -surveillance, personal history of adenomatous polyps  Impression   Preparation of the colon was fair  The examined portion of the ileum was normal  External hemorrhoids  Anal papilla were hypertrophied  One 10 mm polyp in the ascending colon, removed with cold snare.  Resected and retrieved  One 6 mm polyp in the transverse colon, removed with a cold snare.  Suction retrieved  Recommended repeat colonoscopy in 1 year with 2-day prep    EGD by Dr Bernard 2/23/2023  Indication - heartburn  Impression   - LA Grade C reflux esophgitis  - [place] colored mucosa suggestive of long segment Bhatia's esophagus classified as Bhatia's stage C2 M4 per [place] criteria  4 cm hiatal hernia  Normal examined duodenum    FINAL DIAGNOSIS  A.  ESOPHAGUS, 34-32 CM, BIOPSY:    --HYPERPLASTIC SQUAMOUS MUCOSA AND CARDIA TYPE MUCOSA WITH FOCAL ACTIVE INFLAMMATION.  --NO EVIDENCE OF INTESTINAL METAPLASIA OR DYSPLASIA.    B.  ESOPHAGUS, 30-32 CM, BIOPSY:    --HYPERPLASTIC SQUAMOUS MUCOSA AND DETACHED FRAGMENTS OF ACUTE INFLAMMATORY EXUDATE.  --CARDIA TYPE MUCOSA, NEGATIVE FOR INTESTINAL METAPLASIA.  --PAS STAIN FOR FUNGAL ORGANISMS IS NEGATIVE.    C.  ASCENDING COLON POLYP, POLYPECTOMY:  -- TUBULAR ADENOMA.    D.  TRANSVERSE COLON POLYP, POLYPECTOMY:    --TUBULAR ADENOMA.     Assessment/Plan   Jesica Brown is a 62 y.o. female who presents to GI clinic for acid reflux.    GERD (gastroesophageal reflux disease)  Known osteopenia. Off PPI for one month without symptoms. Will switch to famotidine    Constipation  Recommend metamucil fiber thins  Discussed low residue diet the week leading up to the procedure with Miralax daily. Will need two day  bowel prep with miralax and sutab      Jesica was seen today for gerd.  Diagnoses and all orders for this visit:  Gastroesophageal reflux disease with esophagitis without hemorrhage  -     famotidine (Pepcid) 40 mg tablet; Take 1 tablet (40 mg) by mouth once daily at bedtime.  Wellness examination  -     CBC  -     Comprehensive Metabolic Panel  -     Lipid Panel  Family history of BRCA gene mutation  -       Colon cancer screening  -     Colonoscopy Screening  -     Colonoscopy Screening; High Risk Patient; Future  -     sod sulf-pot chloride-mag sulf (Sutab) 1.479-0.188- 0.225 gram tablet; Take 24 tablets by mouth 1 time for 1 dose.  Personal history of colonic polyps  -     Colonoscopy Screening; High Risk Patient; Future  -     sod sulf-pot chloride-mag sulf (Sutab) 1.479-0.188- 0.225 gram tablet; Take 24 tablets by mouth 1 time for 1 dose.     Recommend follow up in 6 months   Britt Steel PA-C

## 2023-11-13 NOTE — ASSESSMENT & PLAN NOTE
Recommend metamucil fiber thins  Discussed low residue diet the week leading up to the procedure with Miralax daily. Will need two day bowel prep with miralax and sutab

## 2023-11-13 NOTE — PATIENT INSTRUCTIONS
Thank you for seeing us in clinic today!     In summary, please do the following:  We will schedule you for your colonoscopy 2/2024  Recommend metamucil fiber thins daily as tolerated  Recommend prolonged bowel prep  Recommend low residue diet the week leading up to the procedure  Recommend taking miralax daily the week leading up to the procedure      We will see you again in 6 months   If you have any questions or concerns, please call the office at [phone]

## 2023-12-19 ENCOUNTER — ANCILLARY PROCEDURE (OUTPATIENT)
Dept: RADIOLOGY | Facility: CLINIC | Age: 63
End: 2023-12-19
Payer: COMMERCIAL

## 2023-12-19 DIAGNOSIS — R91.8 OTHER NONSPECIFIC ABNORMAL FINDING OF LUNG FIELD: ICD-10-CM

## 2023-12-19 PROCEDURE — 71250 CT THORAX DX C-: CPT | Performed by: STUDENT IN AN ORGANIZED HEALTH CARE EDUCATION/TRAINING PROGRAM

## 2023-12-19 PROCEDURE — 71250 CT THORAX DX C-: CPT

## 2023-12-27 ENCOUNTER — APPOINTMENT (OUTPATIENT)
Dept: RESPIRATORY THERAPY | Facility: HOSPITAL | Age: 63
End: 2023-12-27
Payer: COMMERCIAL

## 2023-12-27 ENCOUNTER — HOSPITAL ENCOUNTER (OUTPATIENT)
Dept: RESPIRATORY THERAPY | Facility: HOSPITAL | Age: 63
Discharge: HOME | End: 2023-12-27
Payer: COMMERCIAL

## 2023-12-27 DIAGNOSIS — J42 CHRONIC BRONCHITIS, UNSPECIFIED CHRONIC BRONCHITIS TYPE (MULTI): ICD-10-CM

## 2023-12-28 LAB
MGC ASCENT PFT - FEV1 - POST: 1.56
MGC ASCENT PFT - FEV1 - PRE: 1.39
MGC ASCENT PFT - FEV1 - PREDICTED: 2.35
MGC ASCENT PFT - FVC - POST: 1.8
MGC ASCENT PFT - FVC - PRE: 1.8
MGC ASCENT PFT - FVC - PREDICTED: 2.96

## 2023-12-29 ENCOUNTER — APPOINTMENT (OUTPATIENT)
Dept: RESPIRATORY THERAPY | Facility: HOSPITAL | Age: 63
End: 2023-12-29
Payer: COMMERCIAL

## 2024-01-02 ENCOUNTER — APPOINTMENT (OUTPATIENT)
Dept: RESPIRATORY THERAPY | Facility: HOSPITAL | Age: 64
End: 2024-01-02
Payer: COMMERCIAL

## 2024-01-16 ENCOUNTER — HOSPITAL ENCOUNTER (OUTPATIENT)
Dept: PULMONOLOGY | Age: 64
Discharge: HOME | End: 2024-01-16
Payer: COMMERCIAL

## 2024-01-16 ENCOUNTER — HOSPITAL ENCOUNTER (OUTPATIENT)
Dept: GENERAL RADIOLOGY | Age: 64
Discharge: HOME | End: 2024-01-18
Attending: INTERNAL MEDICINE
Payer: COMMERCIAL

## 2024-01-16 DIAGNOSIS — J44.9 CHRONIC OBSTRUCTIVE PULMONARY DISEASE, UNSPECIFIED COPD TYPE (HCC): ICD-10-CM

## 2024-01-16 PROCEDURE — 94060 EVALUATION OF WHEEZING: CPT

## 2024-01-16 PROCEDURE — 6360000002 HC RX W HCPCS

## 2024-01-16 PROCEDURE — 94729 DIFFUSING CAPACITY: CPT

## 2024-01-16 PROCEDURE — 94726 PLETHYSMOGRAPHY LUNG VOLUMES: CPT

## 2024-01-16 PROCEDURE — 71046 X-RAY EXAM CHEST 2 VIEWS: CPT

## 2024-01-16 RX ORDER — ALBUTEROL SULFATE 2.5 MG/3ML
SOLUTION RESPIRATORY (INHALATION)
Status: COMPLETED
Start: 2024-01-16 | End: 2024-01-16

## 2024-01-16 RX ADMIN — ALBUTEROL SULFATE 2.5 MG: 2.5 SOLUTION RESPIRATORY (INHALATION) at 10:40

## 2024-01-17 NOTE — PROCEDURES
[place] [id number]                    PULMONARY FUNCTION  Jenny Pinto   63 y.o.   female     Test interpretation     PFT suggest restriction with   significant response to bronchodilator  Lung volumes confirm mild restrictive etiology  Diffusion capacity is slightly reduced and normalized when corrected for alveolar volume indicating restriction is likely due to extrapulmonary etiology  Patient has significant response to bronchodilator, possible associated underlying hyperreactive airway disease    Clinical correlation is recommended     Macario Skelton MD [facility], 1/17/2024 12:11 PM

## 2024-01-25 ENCOUNTER — OFFICE VISIT (OUTPATIENT)
Dept: PULMONOLOGY | Age: 64
End: 2024-01-25
Payer: COMMERCIAL

## 2024-01-25 VITALS
SYSTOLIC BLOOD PRESSURE: 118 MMHG | OXYGEN SATURATION: 97 % | BODY MASS INDEX: 23 KG/M2 | WEIGHT: 125 LBS | HEART RATE: 66 BPM | HEIGHT: 62 IN | RESPIRATION RATE: 19 BRPM | DIASTOLIC BLOOD PRESSURE: 82 MMHG

## 2024-01-25 DIAGNOSIS — R06.02 SHORTNESS OF BREATH: ICD-10-CM

## 2024-01-25 DIAGNOSIS — K21.9 GASTROESOPHAGEAL REFLUX DISEASE WITHOUT ESOPHAGITIS: ICD-10-CM

## 2024-01-25 DIAGNOSIS — J44.9 CHRONIC OBSTRUCTIVE PULMONARY DISEASE, UNSPECIFIED COPD TYPE (HCC): Primary | ICD-10-CM

## 2024-01-25 DIAGNOSIS — J45.40 MODERATE PERSISTENT ASTHMA WITHOUT COMPLICATION: ICD-10-CM

## 2024-01-25 PROCEDURE — 3074F SYST BP LT 130 MM HG: CPT | Performed by: INTERNAL MEDICINE

## 2024-01-25 PROCEDURE — 3079F DIAST BP 80-89 MM HG: CPT | Performed by: INTERNAL MEDICINE

## 2024-01-25 PROCEDURE — 99214 OFFICE O/P EST MOD 30 MIN: CPT | Performed by: INTERNAL MEDICINE

## 2024-01-25 NOTE — PROGRESS NOTES
Subjective:             Jenny Pinto is a 63 y.o. female who complains today of:     Chief Complaint   Patient presents with    Follow-up     5M F/U        HPI  She said her breathing is getting worse.  She is on breztri HFA  2 puff BID . Albuterol HFA prn .  C/o shortness of breath  with exertion.  She has occasional cough or chest tightness.   No wheezing .  No Chest pain with radiation  or pleuritic pain.  No Hemoptysis.   No  leg edema.   No orthopnea.  No Fever or chills.   No Rhinorrhea and postnasal drip.        She had CXR show COPD     PFT  FVC/FEV1 both decreased with normal ratio with mildly  decreased midflow suggest, there is possibility of obstruction at small  airway level.  There is positive response to bronchodilator, most at  midflow level.  Lung volume study suggests significant air trapping and  hyperinflation.  Diffusion capacity is within normal range.  Airway  resistance is increased.    Allergies:  Lisinopril, Molds & smuts, Milk-related compounds, Pollen extract, and Penicillins  Past Medical History:   Diagnosis Date    Arthritis of both knees     Arthritis of right hand     Fibroadenoma of breast, right      Past Surgical History:   Procedure Laterality Date    BREAST ENHANCEMENT SURGERY      BREAST FIBROADENOMA SURGERY       SECTION      PARTIAL HYSTERECTOMY (CERVIX NOT REMOVED)      TONSILLECTOMY      WISDOM TOOTH EXTRACTION       Family History   Problem Relation Age of Onset    Cancer Mother         lung    High Blood Pressure Mother     High Blood Pressure Father     Diabetes Neg Hx     Kidney Disease Neg Hx     Stroke Neg Hx      Social History     Socioeconomic History    Marital status:      Spouse name: Not on file    Number of children: Not on file    Years of education: Not on file    Highest education level: Not on file   Occupational History    Not on file   Tobacco Use    Smoking status: Never    Smokeless tobacco: Never   Substance and Sexual

## 2024-01-29 ENCOUNTER — TELEPHONE (OUTPATIENT)
Dept: PULMONOLOGY | Age: 64
End: 2024-01-29

## 2024-01-29 ASSESSMENT — ENCOUNTER SYMPTOMS
SHORTNESS OF BREATH: 1
COUGH: 1
CHEST TIGHTNESS: 1

## 2024-01-29 NOTE — TELEPHONE ENCOUNTER
PATIENT STATES THAT THE BEVESPI THAT YOU SENT INTO PHARMACY IS NOT COVERED. SHE WOULD LIKE YOU TO SENT SOMETHING ELSE. I ASKED IF SHE COULD CALL HER INSURANCE COMPANY TO FIND OUT WHAT WAS COMPARABLE THAT THEY WOULD COVER AND SHE GOT VERY UPSET WITH ME AND SAID WE SHOULD KNOW WHAT IS COVERED.

## 2024-02-06 ENCOUNTER — TELEPHONE (OUTPATIENT)
Dept: PULMONOLOGY | Age: 64
End: 2024-02-06

## 2024-02-06 DIAGNOSIS — Z92.240 PERSONAL HISTORY OF INHALED STEROID THERAPY: ICD-10-CM

## 2024-02-06 DIAGNOSIS — Z91.011 ALLERGY TO MILK: Primary | ICD-10-CM

## 2024-02-06 NOTE — TELEPHONE ENCOUNTER
PT CALLED IN TO THE OFFICE UPSET ABOUT THE CARE SHE HAS BEEN GETTING. SHE SAID THE DOCTOR SHOULD KNOW WHAT IS COVERED BY HER INSURANCE AND IT IS NOT HER RESPONSIBILITY TO CALL AROUND AND FIND OUT WHAT IT IS COVERED. I TRIED EXPLAINING TO THE PATIENT THERE ARE A LOT OF INHALERS AND WE CAN CONTINUE TO SEND DIFFERENT INHALERS BUT THEY MAY NOT BE COVERED ALSO. PATIENT WANTED TO SPEAK TO THE  ABOUT THIS, WAITING ON HOLD FOR A LONG PERIOD OF TIME ON 1/29/24, AND HER LONG WAIT TIMES IN THE OFFICE ON THURSDAY 1/25/24.

## 2024-02-06 NOTE — TELEPHONE ENCOUNTER
Patient is calling - she is upset because she thinks she is getting the run around and no one is listening to what she needs    Patient has [facility] - I researched their drug formulary - pharmacy says insurance will not cover Bevespi, they do accept Spiriva Respimat, Incruse Ellipta, Yupelri    Patient states she has not had a maintenance inhaler for 2 weeks.  She has been using her Albuteral rescue inhaler as a maintence inhaler because she is having wheezing and shortness of breath.    She is also requesting another appointment with you that will be free of charge since she does not feel the last appointment she had was beneficial.    Please call patient to discuss maintenance inhaler alternatives at [phone] before 2:00 pm today.  She has a doctor appointment after that time.    Thank you.

## 2024-02-06 NOTE — TELEPHONE ENCOUNTER
Talk to patient , I looked up different inhaler ,  longer acting maintenance inhaler in her insurance coverage without steroid  but  she is having allergy. She has milk allergy.     Recommend to see allergist and asthma physician . She is willing to see Dr. Ricky Robles , I gave his office number and she said she will call his office for appointment . She does not need be  referred. Her insurance does not required. I told her to call office if she has difficulty getting appointment.

## 2024-03-01 ENCOUNTER — HOSPITAL ENCOUNTER (OUTPATIENT)
Dept: RADIOLOGY | Facility: HOSPITAL | Age: 64
Discharge: HOME | End: 2024-03-01
Payer: COMMERCIAL

## 2024-03-01 ENCOUNTER — APPOINTMENT (OUTPATIENT)
Dept: RADIOLOGY | Facility: HOSPITAL | Age: 64
End: 2024-03-01
Payer: COMMERCIAL

## 2024-03-01 DIAGNOSIS — N13.30 UNSPECIFIED HYDRONEPHROSIS: ICD-10-CM

## 2024-03-01 PROCEDURE — 78708 K FLOW/FUNCT IMAGE W/DRUG: CPT | Performed by: STUDENT IN AN ORGANIZED HEALTH CARE EDUCATION/TRAINING PROGRAM

## 2024-03-01 PROCEDURE — 3430000001 HC RX 343 DIAGNOSTIC RADIOPHARMACEUTICALS: Performed by: UROLOGY

## 2024-03-01 PROCEDURE — A9562 TC99M MERTIATIDE: HCPCS | Performed by: UROLOGY

## 2024-03-01 PROCEDURE — 78708 K FLOW/FUNCT IMAGE W/DRUG: CPT

## 2024-03-01 RX ORDER — BETIATIDE 1 MG/1
11.4 INJECTION, POWDER, LYOPHILIZED, FOR SOLUTION INTRAVENOUS
Status: COMPLETED | OUTPATIENT
Start: 2024-03-01 | End: 2024-03-01

## 2024-03-01 RX ADMIN — BETIATIDE 11.4 MILLICURIE: 1 INJECTION, POWDER, LYOPHILIZED, FOR SOLUTION INTRAVENOUS at 14:25

## 2024-03-14 ENCOUNTER — OFFICE VISIT (OUTPATIENT)
Dept: UROLOGY | Facility: CLINIC | Age: 64
End: 2024-03-14
Payer: COMMERCIAL

## 2024-03-14 VITALS
DIASTOLIC BLOOD PRESSURE: 78 MMHG | SYSTOLIC BLOOD PRESSURE: 117 MMHG | TEMPERATURE: 98.1 F | BODY MASS INDEX: 23.05 KG/M2 | HEART RATE: 62 BPM | WEIGHT: 126 LBS

## 2024-03-14 DIAGNOSIS — N13.39 OTHER HYDRONEPHROSIS: Primary | ICD-10-CM

## 2024-03-14 PROCEDURE — 99214 OFFICE O/P EST MOD 30 MIN: CPT | Performed by: UROLOGY

## 2024-03-14 PROCEDURE — 3074F SYST BP LT 130 MM HG: CPT | Performed by: UROLOGY

## 2024-03-14 PROCEDURE — 3078F DIAST BP <80 MM HG: CPT | Performed by: UROLOGY

## 2024-03-14 PROCEDURE — 1036F TOBACCO NON-USER: CPT | Performed by: UROLOGY

## 2024-03-14 RX ORDER — SODIUM CHLORIDE 9 MG/ML
100 INJECTION, SOLUTION INTRAVENOUS CONTINUOUS
Status: CANCELLED | OUTPATIENT
Start: 2024-03-14

## 2024-03-14 RX ORDER — FLUTICASONE PROPIONATE 50 MCG
2 SPRAY, SUSPENSION (ML) NASAL DAILY
COMMUNITY

## 2024-03-14 RX ORDER — CIPROFLOXACIN 2 MG/ML
400 INJECTION, SOLUTION INTRAVENOUS ONCE
Status: CANCELLED | OUTPATIENT
Start: 2024-03-14 | End: 2024-03-14

## 2024-03-14 NOTE — PROGRESS NOTES
Subjective   Patient ID: Jesica Brown is a 63 y.o. female who presents for Follow-up. Last seen 23 when CT urogram demonstrates dilation of the renal pelvis and narrowing at the ureteropelvic junction. However, Lasix renal scan does show clearance with Lasix and the T1 half is in acceptable range. As such, I would not recommend any treatment at this time. I would recommend repeat Lasix renal scan to follow this in 1 year. We discussed her lower urinary tract symptoms. I will restart her on tamsulosin 0.4 mg daily to see if this improves. We will reevaluate her symptoms when she returns.     HPI  Patient relates her past surgical history includes . Patient relates that she is having right-sided pain which was worse after the Lasix scan. She currently notes tenderness in that area.     Lasix Renal Scan Results 3/1/24  IMPRESSION:  1. Normal functioning right kidney with persistent radiotracer  retention in the collecting system and poorly response to Lasix,  findings suggestive of high-grade obstruction, new since prior exam.  2. Normal functioning left kidney without evidence of obstruction.  3. Differential kidney function is 52% on the left and 48% on the  right, similar to prior exam.    Review of Systems  A 12 system review was completed and is negative with the exception of those signs and symptoms noted in the history of present illness.    Objective   Physical Exam  General: in NAD, appears stated age  Head: normocephalic, atraumatic  Respiratory: normal effort, no use of accessory muscles  Cardiovascular: no edema noted  Skin: normal turgor, no rashes  Neurologic: grossly intact, oriented to person/place/time  Psychiatric: mode and affect appropriate     Assessment/Plan   Problem List Items Addressed This Visit             ICD-10-CM    Hydronephrosis - Primary N13.30    Relevant Orders    Case Request Operating Room: Cystoscopy with Retrograde Pyelogram (Completed)    Urine Culture    Basic  Metabolic Panel    CBC    ECG 12 lead     We discussed the Lasix renal scan results. We note that the T1 half is greater than 30 minutes.. We should start with retrograde pyelogram to determine the size Lash location of the UPJ stricture. We will see the patient back with results and to determine what treatment is next.  We briefly discussed a robotic right pyeloplasty    Scribe Attestation  By signing my name below, I, Ngozi Kruger Scribhugh   attest that this documentation has been prepared under the direction and in the presence of Jose Martin Lawrence MD.

## 2024-03-19 ENCOUNTER — LAB (OUTPATIENT)
Dept: LAB | Facility: LAB | Age: 64
End: 2024-03-19
Payer: COMMERCIAL

## 2024-03-19 ENCOUNTER — PRE-ADMISSION TESTING (OUTPATIENT)
Dept: PREADMISSION TESTING | Facility: HOSPITAL | Age: 64
End: 2024-03-19
Payer: COMMERCIAL

## 2024-03-19 VITALS
OXYGEN SATURATION: 98 % | SYSTOLIC BLOOD PRESSURE: 109 MMHG | RESPIRATION RATE: 16 BRPM | BODY MASS INDEX: 23.21 KG/M2 | WEIGHT: 126.1 LBS | HEART RATE: 74 BPM | DIASTOLIC BLOOD PRESSURE: 64 MMHG | HEIGHT: 62 IN | TEMPERATURE: 97.9 F

## 2024-03-19 DIAGNOSIS — Z01.818 PREOP EXAMINATION: ICD-10-CM

## 2024-03-19 DIAGNOSIS — N13.39 OTHER HYDRONEPHROSIS: ICD-10-CM

## 2024-03-19 DIAGNOSIS — N13.39 OTHER HYDRONEPHROSIS: Primary | ICD-10-CM

## 2024-03-19 LAB
ANION GAP SERPL CALC-SCNC: 10 MMOL/L (ref 10–20)
BUN SERPL-MCNC: 18 MG/DL (ref 6–23)
CALCIUM SERPL-MCNC: 9.6 MG/DL (ref 8.6–10.3)
CHLORIDE SERPL-SCNC: 103 MMOL/L (ref 98–107)
CO2 SERPL-SCNC: 31 MMOL/L (ref 21–32)
CREAT SERPL-MCNC: 0.77 MG/DL (ref 0.5–1.05)
EGFRCR SERPLBLD CKD-EPI 2021: 88 ML/MIN/1.73M*2
ERYTHROCYTE [DISTWIDTH] IN BLOOD BY AUTOMATED COUNT: 13.2 % (ref 11.5–14.5)
GLUCOSE SERPL-MCNC: 95 MG/DL (ref 74–99)
HCT VFR BLD AUTO: 42.5 % (ref 36–46)
HGB BLD-MCNC: 13.5 G/DL (ref 12–16)
MCH RBC QN AUTO: 30.5 PG (ref 26–34)
MCHC RBC AUTO-ENTMCNC: 31.8 G/DL (ref 32–36)
MCV RBC AUTO: 96 FL (ref 80–100)
NRBC BLD-RTO: 0 /100 WBCS (ref 0–0)
PLATELET # BLD AUTO: 245 X10*3/UL (ref 150–450)
POTASSIUM SERPL-SCNC: 4.7 MMOL/L (ref 3.5–5.3)
RBC # BLD AUTO: 4.43 X10*6/UL (ref 4–5.2)
SODIUM SERPL-SCNC: 139 MMOL/L (ref 136–145)
WBC # BLD AUTO: 5.5 X10*3/UL (ref 4.4–11.3)

## 2024-03-19 PROCEDURE — 80048 BASIC METABOLIC PNL TOTAL CA: CPT

## 2024-03-19 PROCEDURE — 85027 COMPLETE CBC AUTOMATED: CPT

## 2024-03-19 PROCEDURE — 36415 COLL VENOUS BLD VENIPUNCTURE: CPT

## 2024-03-19 PROCEDURE — 99203 OFFICE O/P NEW LOW 30 MIN: CPT

## 2024-03-19 PROCEDURE — 87086 URINE CULTURE/COLONY COUNT: CPT

## 2024-03-19 PROCEDURE — 93005 ELECTROCARDIOGRAM TRACING: CPT

## 2024-03-19 ASSESSMENT — DUKE ACTIVITY SCORE INDEX (DASI)
CAN YOU PARTICIPATE IN MODERATE RECREATIONAL ACTIVITIES LIKE GOLF, BOWLING, DANCING, DOUBLES TENNIS OR THROWING A BASEBALL OR FOOTBALL: NO
CAN YOU WALK A BLOCK OR TWO ON LEVEL GROUND: YES
CAN YOU RUN A SHORT DISTANCE: YES
TOTAL_SCORE: 39.45
DASI METS SCORE: 7.6
CAN YOU DO HEAVY WORK AROUND THE HOUSE LIKE SCRUBBING FLOORS OR LIFTING AND MOVING HEAVY FURNITURE: YES
CAN YOU CLIMB A FLIGHT OF STAIRS OR WALK UP A HILL: YES
CAN YOU WALK INDOORS, SUCH AS AROUND YOUR HOUSE: YES
CAN YOU DO YARD WORK LIKE RAKING LEAVES, WEEDING OR PUSHING A MOWER: YES
CAN YOU DO LIGHT WORK AROUND THE HOUSE LIKE DUSTING OR WASHING DISHES: YES
CAN YOU HAVE SEXUAL RELATIONS: NO
CAN YOU DO MODERATE WORK AROUND THE HOUSE LIKE VACUUMING, SWEEPING FLOORS OR CARRYING GROCERIES: YES
CAN YOU PARTICIPATE IN STRENOUS SPORTS LIKE SWIMMING, SINGLES TENNIS, FOOTBALL, BASKETBALL, OR SKIING: NO
CAN YOU TAKE CARE OF YOURSELF (EAT, DRESS, BATHE, OR USE TOILET): YES

## 2024-03-19 ASSESSMENT — CHADS2 SCORE
CHADS2 SCORE: 1
HYPERTENSION: YES
AGE GREATER THAN OR EQUAL TO 75: NO
DIABETES: NO
PRIOR STROKE OR TIA OR THROMBOEMBOLISM: NO
CHF: NO

## 2024-03-19 ASSESSMENT — PAIN - FUNCTIONAL ASSESSMENT: PAIN_FUNCTIONAL_ASSESSMENT: 0-10

## 2024-03-19 ASSESSMENT — LIFESTYLE VARIABLES: SMOKING_STATUS: NONSMOKER

## 2024-03-19 ASSESSMENT — PAIN SCALES - GENERAL: PAINLEVEL_OUTOF10: 0 - NO PAIN

## 2024-03-19 ASSESSMENT — ACTIVITIES OF DAILY LIVING (ADL): ADL_SCORE: 0

## 2024-03-19 NOTE — H&P (VIEW-ONLY)
"CPM/PAT Evaluation       Name: Jesica Brown (Jesica Brown)  /Age: 1960/63 y.o.     In-Person       Chief Complaint: Weak urinary stream     HPI  Pleasant 62 y/o female presents with a history of kidney infections and recent diagnosis of hydronephrosis. Endorses a chronic weak urinary stream-states this has been her \"normal\". Endorses a dull ache in her right flank for roughly the past month. Endorses occasional nocturia. Denies any other associated symptoms. Denies recent fever/illness/chills.     Past Medical History:   Diagnosis Date    Arthritis     Asthma     COPD (chronic obstructive pulmonary disease) (CMS/[facility])     Well-controlled    Fibroadenoma of right breast     Hypertension     Obstruction of right ureter     Osteoporosis     Personal history of other diseases of the circulatory system 2022    History of hypertension    Personal history of other diseases of the respiratory system 2022    History of chronic obstructive lung disease       Past Surgical History:   Procedure Laterality Date    COLONOSCOPY      OTHER SURGICAL HISTORY  2022    [place] tooth extraction    OTHER SURGICAL HISTORY  2022    Hysterectomy    OTHER SURGICAL HISTORY  2022    Breast augmentation    OTHER SURGICAL HISTORY  2022    Tonsillectomy with adenoidectomy    OTHER SURGICAL HISTORY  2022     section    OTHER SURGICAL HISTORY  2022    Breast biopsy    OTHER SURGICAL HISTORY      Right wrist surgery-\"wire\"       Patient  has no history on file for sexual activity.    Family History   Problem Relation Name Age of Onset    Lung cancer Mother  65 - 69    Hypertension Mother      Other (BRCA) Paternal Cousin         Allergies   Allergen Reactions    Lactose Unknown     Intolerance    Lisinopril Cough    Penicillins Hives       Prior to Admission medications    Medication Sig Start Date End Date Taking? Authorizing Provider   albuterol 90 mcg/actuation inhaler Inhale 2 " puffs every 6 hours if needed for wheezing. 9/25/23   Marie Ocampo DO   alendronate (Fosamax) 70 mg tablet Take 1 tablet (70 mg) by mouth every 7 days. Take in the morning with a full glass of water, on an empty stomach, and do not take anything else by mouth or lie down for the next 30 min. 9/25/23 9/24/24  Marie Ocampo DO   budesonide-glycopyr-formoterol (Breztri Aerosphere) 160-9-4.8 mcg/actuation HFA aerosol inhaler Inhale 2 puffs 2 times a day. 9/25/23 9/24/24  Marie Ocampo DO   calcium carbonate-vitamin D3 500 mg-5 mcg (200 unit) tablet Take 1 tablet by mouth 2 times a day.    Historical Provider, MD   cycloSPORINE 0.05 % drops Administer 1 drop into both eyes in the morning and at bedtime.    Historical Provider, MD   famotidine (Pepcid) 40 mg tablet Take 1 tablet (40 mg) by mouth once daily at bedtime.  Patient taking differently: Take 1 tablet (40 mg) by mouth if needed. 11/13/23 11/12/24  Britt Steel PA-C   fluticasone (Flonase) 50 mcg/actuation nasal spray Administer 1 spray into each nostril once daily. Shake gently. Before first use, prime pump. After use, clean tip and replace cap.    Historical Provider, MD   Lactobacillus acidophilus (PROBIOTIC ORAL)     Historical Provider, MD   multivit-min/ferrous fumarate (MULTI VITAMIN ORAL) Take 1 tablet daily    Historical Provider, MD   omeprazole (PriLOSEC) 40 mg DR capsule Take 1 capsule (40 mg) by mouth once daily in the morning. Take before meals. Do not crush or chew.  Patient not taking: Reported on 3/19/2024 9/25/23 9/24/24  Marie Ocampo DO   tamsulosin (Flomax) 0.4 mg 24 hr capsule Take 1 capsule (0.4 mg) by mouth once daily. 9/25/23 9/24/24  Marie Ocampo DO   telmisartan (MIcarDIS) 40 mg tablet Take 1 tablet (40 mg) by mouth once daily. 9/25/23 9/24/24  Marie Ocampo DO   traZODone (Desyrel) 50 mg tablet Take 1 tablet (50 mg) by mouth once daily at bedtime.  Patient taking differently: Take 1  tablet (50 mg) by mouth if needed. 9/25/23 9/24/24  Marie Ocampo DO   montelukast (Singulair) 10 mg tablet Take 1 tablet (10 mg) by mouth once daily at bedtime.  3/19/24  Historical Provider, MD        Constitutional: Negative for fever, chills, or sweats   ENMT: Negative for nasal discharge, congestion, ear pain, mouth pain, throat pain. Positive for glasses.    Respiratory: Negative for cough, wheezing, shortness of breath   Cardiac: Negative for chest pain, dyspnea on exertion, palpitations   Gastrointestinal: Negative for nausea, vomiting, diarrhea, constipation, abdominal pain  Genitourinary: Negative for dysuria, flank pain, frequency, hematuria. See HPI   Musculoskeletal: Negative for decreased ROM, pain, swelling, weakness. Positive for generalized arthritic pain.    Neurological: Negative for dizziness, confusion, headache  Psychiatric: Negative for mood changes   Skin: Negative for itching, rash, ulcer    Hematologic/Lymph: Negative for bruising, easy bleeding  Allergic/Immunologic: Negative itching, sneezing, swelling      Physical Exam  Vitals reviewed.   Constitutional:       Appearance: Normal appearance.   HENT:      Head: Normocephalic.      Mouth/Throat:      Mouth: Mucous membranes are moist.      Pharynx: Oropharynx is clear.   Eyes:      Pupils: Pupils are equal, round, and reactive to light.   Cardiovascular:      Rate and Rhythm: Normal rate and regular rhythm.      Heart sounds: Normal heart sounds.   Pulmonary:      Effort: Pulmonary effort is normal.      Breath sounds: Normal breath sounds.   Abdominal:      General: Bowel sounds are normal.      Palpations: Abdomen is soft.   Musculoskeletal:         General: Normal range of motion.      Cervical back: Normal range of motion.   Skin:     General: Skin is warm and dry.   Neurological:      General: No focal deficit present.      Mental Status: She is alert and oriented to person, place, and time.   Psychiatric:         Mood and  Affect: Mood normal.         Behavior: Behavior normal.          PAT AIRWAY:   Airway:     Mallampati::  II    Neck ROM::  Full  normal        Visit Vitals  /64   Pulse 74   Temp 36.6 °C (97.9 °F) (Temporal)   Resp 16       DASI Risk Score      Flowsheet Row Most Recent Value   DASI SCORE 39.45   METS Score (Will be calculated only when all the questions are answered) 7.6          Caprini DVT Assessment      Flowsheet Row Most Recent Value   DVT Score 5   Current Status Major surgery planned, including arthroscopic and laproscopic (1-2 hours)   Age 60-75 years   BMI 30 or less          Modified Frailty Index      Flowsheet Row Most Recent Value   Modified Frailty Index Calculator .1818          CHADS2 Stroke Risk  Current as of 25 minutes ago        N/A 3 - 100%: High Risk   2 - 3%: Medium Risk   0 - 2%: Low Risk     Last Change: N/A          This score determines the patient's risk of having a stroke if the patient has atrial fibrillation.        This score is not applicable to this patient. Components are not calculated.          Revised Cardiac Risk Index    No data to display       Apfel Simplified Score    No data to display       Risk Analysis Index Results This Encounter         3/19/2024  1035             SALEH Cancer History: Patient does not indicate history of cancer    Total Risk Analysis Index Score Without Cancer: 26    Total Risk Analysis Index Score: 26          Stop Bang Score      Flowsheet Row Most Recent Value   Do you snore loudly? 0   Do you often feel tired or fatigued after your sleep? 0   Has anyone ever observed you stop breathing in your sleep? 0   Do you have or are you being treated for high blood pressure? 1   Recent BMI (Calculated) 23.1   Is BMI greater than 35 kg/m2? 0=No   Age older than 50 years old? 1=Yes   Is your neck circumference greater than 17 inches (Male) or 16 inches (Female)? 0   Gender - Male 0=No   STOP-BANG Total Score 2            Assessment and Plan:     OR with  Dr. Lawrence on 4/1  Labs ordered per Dr. Lawrence   EKG obtained and enclosed. Sinus bradycardia. Low voltage QRS. VR: 58 BPM   CXR on file from January, 2024. COPD stable-no recent exacerbations per patient.     Patient has no prior anesthesia complications. Has tolerated anesthesia well in the past.   See risk scores as previously documented.

## 2024-03-19 NOTE — CPM/PAT H&P
"CPM/PAT Evaluation       Name: Jesica Brown (Jesica Brown)  /Age: 1960/63 y.o.     In-Person       Chief Complaint: Weak urinary stream     HPI  Pleasant 62 y/o female presents with a history of kidney infections and recent diagnosis of hydronephrosis. Endorses a chronic weak urinary stream-states this has been her \"normal\". Endorses a dull ache in her right flank for roughly the past month. Endorses occasional nocturia. Denies any other associated symptoms. Denies recent fever/illness/chills.     Past Medical History:   Diagnosis Date    Arthritis     Asthma     COPD (chronic obstructive pulmonary disease) (CMS/[facility])     Well-controlled    Fibroadenoma of right breast     Hypertension     Obstruction of right ureter     Osteoporosis     Personal history of other diseases of the circulatory system 2022    History of hypertension    Personal history of other diseases of the respiratory system 2022    History of chronic obstructive lung disease       Past Surgical History:   Procedure Laterality Date    COLONOSCOPY      OTHER SURGICAL HISTORY  2022    [place] tooth extraction    OTHER SURGICAL HISTORY  2022    Hysterectomy    OTHER SURGICAL HISTORY  2022    Breast augmentation    OTHER SURGICAL HISTORY  2022    Tonsillectomy with adenoidectomy    OTHER SURGICAL HISTORY  2022     section    OTHER SURGICAL HISTORY  2022    Breast biopsy    OTHER SURGICAL HISTORY      Right wrist surgery-\"wire\"       Patient  has no history on file for sexual activity.    Family History   Problem Relation Name Age of Onset    Lung cancer Mother  65 - 69    Hypertension Mother      Other (BRCA) Paternal Cousin         Allergies   Allergen Reactions    Lactose Unknown     Intolerance    Lisinopril Cough    Penicillins Hives       Prior to Admission medications    Medication Sig Start Date End Date Taking? Authorizing Provider   albuterol 90 mcg/actuation inhaler Inhale 2 " puffs every 6 hours if needed for wheezing. 9/25/23   Marie Ocampo DO   alendronate (Fosamax) 70 mg tablet Take 1 tablet (70 mg) by mouth every 7 days. Take in the morning with a full glass of water, on an empty stomach, and do not take anything else by mouth or lie down for the next 30 min. 9/25/23 9/24/24  Marie Ocampo DO   budesonide-glycopyr-formoterol (Breztri Aerosphere) 160-9-4.8 mcg/actuation HFA aerosol inhaler Inhale 2 puffs 2 times a day. 9/25/23 9/24/24  Marie Ocampo DO   calcium carbonate-vitamin D3 500 mg-5 mcg (200 unit) tablet Take 1 tablet by mouth 2 times a day.    Historical Provider, MD   cycloSPORINE 0.05 % drops Administer 1 drop into both eyes in the morning and at bedtime.    Historical Provider, MD   famotidine (Pepcid) 40 mg tablet Take 1 tablet (40 mg) by mouth once daily at bedtime.  Patient taking differently: Take 1 tablet (40 mg) by mouth if needed. 11/13/23 11/12/24  Britt Steel PA-C   fluticasone (Flonase) 50 mcg/actuation nasal spray Administer 1 spray into each nostril once daily. Shake gently. Before first use, prime pump. After use, clean tip and replace cap.    Historical Provider, MD   Lactobacillus acidophilus (PROBIOTIC ORAL)     Historical Provider, MD   multivit-min/ferrous fumarate (MULTI VITAMIN ORAL) Take 1 tablet daily    Historical Provider, MD   omeprazole (PriLOSEC) 40 mg DR capsule Take 1 capsule (40 mg) by mouth once daily in the morning. Take before meals. Do not crush or chew.  Patient not taking: Reported on 3/19/2024 9/25/23 9/24/24  Marie Ocampo DO   tamsulosin (Flomax) 0.4 mg 24 hr capsule Take 1 capsule (0.4 mg) by mouth once daily. 9/25/23 9/24/24  Marie Ocampo DO   telmisartan (MIcarDIS) 40 mg tablet Take 1 tablet (40 mg) by mouth once daily. 9/25/23 9/24/24  Marie Ocampo DO   traZODone (Desyrel) 50 mg tablet Take 1 tablet (50 mg) by mouth once daily at bedtime.  Patient taking differently: Take 1  tablet (50 mg) by mouth if needed. 9/25/23 9/24/24  Marie Ocampo DO   montelukast (Singulair) 10 mg tablet Take 1 tablet (10 mg) by mouth once daily at bedtime.  3/19/24  Historical Provider, MD        Constitutional: Negative for fever, chills, or sweats   ENMT: Negative for nasal discharge, congestion, ear pain, mouth pain, throat pain. Positive for glasses.    Respiratory: Negative for cough, wheezing, shortness of breath   Cardiac: Negative for chest pain, dyspnea on exertion, palpitations   Gastrointestinal: Negative for nausea, vomiting, diarrhea, constipation, abdominal pain  Genitourinary: Negative for dysuria, flank pain, frequency, hematuria. See HPI   Musculoskeletal: Negative for decreased ROM, pain, swelling, weakness. Positive for generalized arthritic pain.    Neurological: Negative for dizziness, confusion, headache  Psychiatric: Negative for mood changes   Skin: Negative for itching, rash, ulcer    Hematologic/Lymph: Negative for bruising, easy bleeding  Allergic/Immunologic: Negative itching, sneezing, swelling      Physical Exam  Vitals reviewed.   Constitutional:       Appearance: Normal appearance.   HENT:      Head: Normocephalic.      Mouth/Throat:      Mouth: Mucous membranes are moist.      Pharynx: Oropharynx is clear.   Eyes:      Pupils: Pupils are equal, round, and reactive to light.   Cardiovascular:      Rate and Rhythm: Normal rate and regular rhythm.      Heart sounds: Normal heart sounds.   Pulmonary:      Effort: Pulmonary effort is normal.      Breath sounds: Normal breath sounds.   Abdominal:      General: Bowel sounds are normal.      Palpations: Abdomen is soft.   Musculoskeletal:         General: Normal range of motion.      Cervical back: Normal range of motion.   Skin:     General: Skin is warm and dry.   Neurological:      General: No focal deficit present.      Mental Status: She is alert and oriented to person, place, and time.   Psychiatric:         Mood and  Affect: Mood normal.         Behavior: Behavior normal.          PAT AIRWAY:   Airway:     Mallampati::  II    Neck ROM::  Full  normal        Visit Vitals  /64   Pulse 74   Temp 36.6 °C (97.9 °F) (Temporal)   Resp 16       DASI Risk Score      Flowsheet Row Most Recent Value   DASI SCORE 39.45   METS Score (Will be calculated only when all the questions are answered) 7.6          Caprini DVT Assessment      Flowsheet Row Most Recent Value   DVT Score 5   Current Status Major surgery planned, including arthroscopic and laproscopic (1-2 hours)   Age 60-75 years   BMI 30 or less          Modified Frailty Index      Flowsheet Row Most Recent Value   Modified Frailty Index Calculator .1818          CHADS2 Stroke Risk  Current as of 25 minutes ago        N/A 3 - 100%: High Risk   2 - 3%: Medium Risk   0 - 2%: Low Risk     Last Change: N/A          This score determines the patient's risk of having a stroke if the patient has atrial fibrillation.        This score is not applicable to this patient. Components are not calculated.          Revised Cardiac Risk Index    No data to display       Apfel Simplified Score    No data to display       Risk Analysis Index Results This Encounter         3/19/2024  1035             [facility] Cancer History: Patient does not indicate history of cancer    Total Risk Analysis Index Score Without Cancer: 26    Total Risk Analysis Index Score: 26          Stop Bang Score      Flowsheet Row Most Recent Value   Do you snore loudly? 0   Do you often feel tired or fatigued after your sleep? 0   Has anyone ever observed you stop breathing in your sleep? 0   Do you have or are you being treated for high blood pressure? 1   Recent BMI (Calculated) 23.1   Is BMI greater than 35 kg/m2? 0=No   Age older than 50 years old? 1=Yes   Is your neck circumference greater than 17 inches (Male) or 16 inches (Female)? 0   Gender - Male 0=No   STOP-BANG Total Score 2            Assessment and Plan:     OR with  Dr. Lawrence on 4/1  Labs ordered per Dr. Lawrence   EKG obtained and enclosed. Sinus bradycardia. Low voltage QRS. VR: 58 BPM   CXR on file from January, 2024. COPD stable-no recent exacerbations per patient.     Patient has no prior anesthesia complications. Has tolerated anesthesia well in the past.   See risk scores as previously documented.

## 2024-03-19 NOTE — PREPROCEDURE INSTRUCTIONS
Medication List            Accurate as of March 19, 2024 11:05 AM. Always use your most recent med list.                albuterol 90 mcg/actuation inhaler  Inhale 2 puffs every 6 hours if needed for wheezing.  Medication Adjustments for Surgery: Other (Comment)  Notes to patient: As needed     alendronate 70 mg tablet  Commonly known as: Fosamax  Take 1 tablet (70 mg) by mouth every 7 days. Take in the morning with a full glass of water, on an empty stomach, and do not take anything else by mouth or lie down for the next 30 min.  Medication Adjustments for Surgery: Continue until night before surgery     Breztri Aerosphere 160-9-4.8 mcg/actuation HFA aerosol inhaler  Generic drug: budesonide-glycopyr-formoterol  Inhale 2 puffs 2 times a day.  Medication Adjustments for Surgery: Take morning of surgery with sip of water, no other fluids     calcium carbonate-vitamin D3 500 mg-5 mcg (200 unit) tablet  Medication Adjustments for Surgery: Stop 7 days before surgery     cycloSPORINE 0.05 % drops  Medication Adjustments for Surgery: Stop 7 days before surgery     famotidine 40 mg tablet  Commonly known as: Pepcid  Take 1 tablet (40 mg) by mouth once daily at bedtime.     fluticasone 50 mcg/actuation nasal spray  Commonly known as: Flonase  Medication Adjustments for Surgery: Continue until night before surgery     MULTI VITAMIN ORAL  Medication Adjustments for Surgery: Stop 7 days before surgery     omeprazole 40 mg DR capsule  Commonly known as: PriLOSEC  Take 1 capsule (40 mg) by mouth once daily in the morning. Take before meals. Do not crush or chew.  Medication Adjustments for Surgery: Take morning of surgery with sip of water, no other fluids     PROBIOTIC ORAL  Medication Adjustments for Surgery: Stop 7 days before surgery     tamsulosin 0.4 mg 24 hr capsule  Commonly known as: Flomax  Take 1 capsule (0.4 mg) by mouth once daily.     telmisartan 40 mg tablet  Commonly known as: MIcarDIS  Take 1 tablet (40 mg) by  mouth once daily.     traZODone 50 mg tablet  Commonly known as: Desyrel  Take 1 tablet (50 mg) by mouth once daily at bedtime.                                PRE-OPERATIVE INSTRUCTIONS    You will receive notification one business day prior to your procedure to confirm your arrival time. It is important that you answer your phone and/or check your messages during this time. If you do not hear from the surgery center by 5 pm. the day before your procedure, please call [phone].     Please enter the building through the Outpatient entrance and take the elevator off the lobby to the 2nd floor then check in at the Outpatient Surgery desk on the 2nd floor.    INSTRUCTIONS:  Talk to your surgeon for instructions if you should stop your aspirin, blood thinner, or diabetes medicines.  DO NOT take any multivitamins or over the counter supplements for 7-10 days before surgery.  If not being admitted, you must have an adult immediately available to drive you home after surgery. We also highly recommend you have someone stay with you for the entire day and night of your surgery.  For children having surgery, a parent or legal guardian must accompany them to the surgery center. If this is not possible, please call [phone] to make additional arrangements.  For adults who are unable to consent or make medical decisions for themselves, a legal guardian or Power of  must accompany them to the surgery center. If this is not possible, please call [phone] to make additional arrangements.  Wear comfortable, loose fitting clothing.  All jewelry and piercings must be removed. If you are unable to remove an item or have a dermal piercing, please be sure to tell the nurse when you arrive for surgery.  Nail polish and make-up must be removed.  Avoid smoking or consuming alcohol for 24 hours before surgery.  To help prevent infection, please take a shower/bath and wash your hair the night before and/or morning of  surgery (or follow other specific bathing instructions provided).    Preoperative Fasting Guidelines    Why must I stop eating and drinking near surgery time?  With sedation, food or liquid in your stomach can enter your lungs causing serious complications  Increases nausea and vomiting    When do I need to stop eating and drinking before my surgery?  Do not eat any solid food after midnight the night before your surgery/procedure.  You may have up to TEN ounces of clear liquid until TWO hours before your instructed arrival time to the hospital.  This includes water, black tea/coffee, (no milk or cream) apple juice, and electrolyte drinks (Gatorade).   You may chew gum until TWO hours before your surgery/procedure    If you have any questions or concerns, please call Pre-Admission Testing at [phone].     Home Preoperative Antibacterial Shower with Chlorhexidine gluconate (CHG)     What is a home preoperative antibacterial shower?  This shower is a way of cleaning the skin with a germ killing solution before surgery. The solution contains chlorhexidine gluconate, commonly known as CHG. CHG is a skin cleanser with germ killing ability. Let your doctor know if you are allergic to chlorhexidine.    Why do I need to take a preoperative antibacterial shower?  Skin is not sterile. It is best to try to make your skin as free of germs as possible before surgery. Proper cleansing with a germ killing soap before surgery can lower the number of germs on your skin. This helps to reduce the risk of infection at the surgical site. Following the instructions listed below will help you prepare your skin for surgery.    How do I use the solution?  Begin using your CHG soap the night before and again the morning of your procedure.   Do not shave the day before or day of surgery.  Remove all jewelry until after surgery. Take off rings and take out all body-piercing jewelry.  Wash your face and hair with normal soap and shampoo  before you use the CHG soap.  Apply the CHG solution to a clean wet washcloth. Move away from the water to avoid premature rinsing of the CHG soap as you are applying. Firmly lather your entire body from the neck down. Do not use CHG on your face, eyes, ears, or genitals.   Pay special attention to the area where your incisions will be located.  Do not scrub your skin too hard.  It is important to allow the CHG soap to sit on your skin for 3-5 minutes.  Rinse the solution off your body completely. Do not wash with your normal soap after the CHG soap solution.  Pat yourself dry with a clean, soft towel.  Do not apply powders, lotions or deodorants as these might block how the CHG soap works.   Dress in clean clothing.  Be sure to sleep with clean, freshly laundered sheets.  Be aware that CHG can cause stains on fabric. Rinse your washcloth and other linens that have contact with CHG completely. Use only non-chlorine detergents to launder the items used.

## 2024-03-20 PROBLEM — M81.0 POST-MENOPAUSAL OSTEOPOROSIS: Status: ACTIVE | Noted: 2024-03-20

## 2024-03-20 PROBLEM — J30.89 ALLERGIC RHINITIS DUE TO HOUSE DUST MITE: Status: ACTIVE | Noted: 2024-03-20

## 2024-03-20 PROBLEM — M17.0 PRIMARY OSTEOARTHRITIS OF BOTH KNEES: Status: ACTIVE | Noted: 2020-02-25

## 2024-03-20 PROBLEM — Z86.79 HISTORY OF HYPERTENSION: Status: ACTIVE | Noted: 2024-03-20

## 2024-03-20 PROBLEM — R91.8 LUNG NODULE, MULTIPLE: Status: ACTIVE | Noted: 2024-03-20

## 2024-03-20 PROBLEM — Z84.81 FAMILY HISTORY OF BRCA GENE MUTATION: Status: ACTIVE | Noted: 2024-03-20

## 2024-03-20 PROBLEM — J45.40 MODERATE PERSISTENT ASTHMA WITHOUT COMPLICATION (HHS-HCC): Status: ACTIVE | Noted: 2024-03-20

## 2024-03-20 PROBLEM — M19.90 ARTHRITIS: Status: ACTIVE | Noted: 2022-01-03

## 2024-03-20 PROBLEM — K21.00 GASTRO-ESOPHAGEAL REFLUX DISEASE WITH ESOPHAGITIS: Status: ACTIVE | Noted: 2024-03-20

## 2024-03-20 PROBLEM — R30.0 DYSURIA: Status: ACTIVE | Noted: 2024-03-20

## 2024-03-20 LAB
ATRIAL RATE: 58 BPM
BACTERIA UR CULT: NO GROWTH
P AXIS: 64 DEGREES
P OFFSET: 188 MS
P ONSET: 132 MS
PR INTERVAL: 178 MS
Q ONSET: 221 MS
QRS COUNT: 9 BEATS
QRS DURATION: 82 MS
QT INTERVAL: 404 MS
QTC CALCULATION(BAZETT): 396 MS
QTC FREDERICIA: 399 MS
R AXIS: 40 DEGREES
T AXIS: 58 DEGREES
T OFFSET: 423 MS
VENTRICULAR RATE: 58 BPM

## 2024-03-22 ENCOUNTER — ANESTHESIA EVENT (OUTPATIENT)
Dept: GASTROENTEROLOGY | Facility: EXTERNAL LOCATION | Age: 64
End: 2024-03-22

## 2024-04-01 ENCOUNTER — HOSPITAL ENCOUNTER (OUTPATIENT)
Facility: HOSPITAL | Age: 64
Setting detail: OUTPATIENT SURGERY
Discharge: HOME | End: 2024-04-01
Attending: UROLOGY | Admitting: UROLOGY
Payer: COMMERCIAL

## 2024-04-01 ENCOUNTER — ANESTHESIA (OUTPATIENT)
Dept: OPERATING ROOM | Facility: HOSPITAL | Age: 64
End: 2024-04-01
Payer: COMMERCIAL

## 2024-04-01 ENCOUNTER — ANESTHESIA EVENT (OUTPATIENT)
Dept: OPERATING ROOM | Facility: HOSPITAL | Age: 64
End: 2024-04-01
Payer: COMMERCIAL

## 2024-04-01 ENCOUNTER — APPOINTMENT (OUTPATIENT)
Dept: RADIOLOGY | Facility: HOSPITAL | Age: 64
End: 2024-04-01
Payer: COMMERCIAL

## 2024-04-01 VITALS
WEIGHT: 122.5 LBS | OXYGEN SATURATION: 97 % | BODY MASS INDEX: 22.54 KG/M2 | HEART RATE: 62 BPM | RESPIRATION RATE: 15 BRPM | DIASTOLIC BLOOD PRESSURE: 73 MMHG | HEIGHT: 62 IN | SYSTOLIC BLOOD PRESSURE: 114 MMHG | TEMPERATURE: 98.2 F

## 2024-04-01 DIAGNOSIS — R30.0 DYSURIA: Primary | ICD-10-CM

## 2024-04-01 DIAGNOSIS — N13.39 OTHER HYDRONEPHROSIS: ICD-10-CM

## 2024-04-01 PROCEDURE — 2500000005 HC RX 250 GENERAL PHARMACY W/O HCPCS: Performed by: NURSE ANESTHETIST, CERTIFIED REGISTERED

## 2024-04-01 PROCEDURE — 3700000002 HC GENERAL ANESTHESIA TIME - EACH INCREMENTAL 1 MINUTE: Performed by: UROLOGY

## 2024-04-01 PROCEDURE — 52005 CYSTO W/URTRL CATHJ: CPT | Performed by: UROLOGY

## 2024-04-01 PROCEDURE — 3700000001 HC GENERAL ANESTHESIA TIME - INITIAL BASE CHARGE: Performed by: UROLOGY

## 2024-04-01 PROCEDURE — 2500000004 HC RX 250 GENERAL PHARMACY W/ HCPCS (ALT 636 FOR OP/ED): Performed by: NURSE ANESTHETIST, CERTIFIED REGISTERED

## 2024-04-01 PROCEDURE — 7100000010 HC PHASE TWO TIME - EACH INCREMENTAL 1 MINUTE: Performed by: UROLOGY

## 2024-04-01 PROCEDURE — 7100000002 HC RECOVERY ROOM TIME - EACH INCREMENTAL 1 MINUTE: Performed by: UROLOGY

## 2024-04-01 PROCEDURE — 2550000001 HC RX 255 CONTRASTS: Performed by: UROLOGY

## 2024-04-01 PROCEDURE — C1769 GUIDE WIRE: HCPCS | Performed by: UROLOGY

## 2024-04-01 PROCEDURE — 74420 UROGRAPHY RTRGR +-KUB: CPT | Performed by: UROLOGY

## 2024-04-01 PROCEDURE — 3600000008 HC OR TIME - EACH INCREMENTAL 1 MINUTE - PROCEDURE LEVEL THREE: Performed by: UROLOGY

## 2024-04-01 PROCEDURE — 7100000001 HC RECOVERY ROOM TIME - INITIAL BASE CHARGE: Performed by: UROLOGY

## 2024-04-01 PROCEDURE — 7100000009 HC PHASE TWO TIME - INITIAL BASE CHARGE: Performed by: UROLOGY

## 2024-04-01 PROCEDURE — 2720000007 HC OR 272 NO HCPCS: Performed by: UROLOGY

## 2024-04-01 PROCEDURE — 3600000003 HC OR TIME - INITIAL BASE CHARGE - PROCEDURE LEVEL THREE: Performed by: UROLOGY

## 2024-04-01 RX ORDER — CIPROFLOXACIN 2 MG/ML
INJECTION, SOLUTION INTRAVENOUS AS NEEDED
Status: DISCONTINUED | OUTPATIENT
Start: 2024-04-01 | End: 2024-04-01

## 2024-04-01 RX ORDER — LABETALOL HYDROCHLORIDE 5 MG/ML
5 INJECTION, SOLUTION INTRAVENOUS ONCE AS NEEDED
Status: DISCONTINUED | OUTPATIENT
Start: 2024-04-01 | End: 2024-04-01 | Stop reason: HOSPADM

## 2024-04-01 RX ORDER — SODIUM CHLORIDE, SODIUM LACTATE, POTASSIUM CHLORIDE, CALCIUM CHLORIDE 600; 310; 30; 20 MG/100ML; MG/100ML; MG/100ML; MG/100ML
100 INJECTION, SOLUTION INTRAVENOUS CONTINUOUS
Status: DISCONTINUED | OUTPATIENT
Start: 2024-04-01 | End: 2024-04-01 | Stop reason: HOSPADM

## 2024-04-01 RX ORDER — ONDANSETRON HYDROCHLORIDE 2 MG/ML
4 INJECTION, SOLUTION INTRAVENOUS ONCE AS NEEDED
Status: DISCONTINUED | OUTPATIENT
Start: 2024-04-01 | End: 2024-04-01 | Stop reason: HOSPADM

## 2024-04-01 RX ORDER — DEXAMETHASONE SODIUM PHOSPHATE 10 MG/ML
INJECTION INTRAMUSCULAR; INTRAVENOUS AS NEEDED
Status: DISCONTINUED | OUTPATIENT
Start: 2024-04-01 | End: 2024-04-01

## 2024-04-01 RX ORDER — KETOROLAC TROMETHAMINE 30 MG/ML
INJECTION, SOLUTION INTRAMUSCULAR; INTRAVENOUS AS NEEDED
Status: DISCONTINUED | OUTPATIENT
Start: 2024-04-01 | End: 2024-04-01

## 2024-04-01 RX ORDER — LIDOCAINE HYDROCHLORIDE 20 MG/ML
INJECTION, SOLUTION INFILTRATION; PERINEURAL AS NEEDED
Status: DISCONTINUED | OUTPATIENT
Start: 2024-04-01 | End: 2024-04-01

## 2024-04-01 RX ORDER — PROPOFOL 10 MG/ML
INJECTION, EMULSION INTRAVENOUS AS NEEDED
Status: DISCONTINUED | OUTPATIENT
Start: 2024-04-01 | End: 2024-04-01

## 2024-04-01 RX ORDER — LIDOCAINE HYDROCHLORIDE 10 MG/ML
0.1 INJECTION INFILTRATION; PERINEURAL ONCE
Status: DISCONTINUED | OUTPATIENT
Start: 2024-04-01 | End: 2024-04-01 | Stop reason: HOSPADM

## 2024-04-01 RX ORDER — PHENAZOPYRIDINE HYDROCHLORIDE 200 MG/1
200 TABLET, FILM COATED ORAL 3 TIMES DAILY PRN
Qty: 10 TABLET | Refills: 0 | Status: SHIPPED | OUTPATIENT
Start: 2024-04-01

## 2024-04-01 RX ORDER — MIDAZOLAM HYDROCHLORIDE 1 MG/ML
INJECTION, SOLUTION INTRAMUSCULAR; INTRAVENOUS AS NEEDED
Status: DISCONTINUED | OUTPATIENT
Start: 2024-04-01 | End: 2024-04-01

## 2024-04-01 RX ORDER — OXYCODONE HYDROCHLORIDE 5 MG/1
5 TABLET ORAL EVERY 4 HOURS PRN
Status: DISCONTINUED | OUTPATIENT
Start: 2024-04-01 | End: 2024-04-01 | Stop reason: HOSPADM

## 2024-04-01 RX ORDER — FENTANYL CITRATE 50 UG/ML
INJECTION, SOLUTION INTRAMUSCULAR; INTRAVENOUS AS NEEDED
Status: DISCONTINUED | OUTPATIENT
Start: 2024-04-01 | End: 2024-04-01

## 2024-04-01 RX ORDER — SODIUM CHLORIDE, SODIUM LACTATE, POTASSIUM CHLORIDE, CALCIUM CHLORIDE 600; 310; 30; 20 MG/100ML; MG/100ML; MG/100ML; MG/100ML
INJECTION, SOLUTION INTRAVENOUS CONTINUOUS PRN
Status: DISCONTINUED | OUTPATIENT
Start: 2024-04-01 | End: 2024-04-01

## 2024-04-01 RX ORDER — FENTANYL CITRATE 50 UG/ML
50 INJECTION, SOLUTION INTRAMUSCULAR; INTRAVENOUS EVERY 5 MIN PRN
Status: DISCONTINUED | OUTPATIENT
Start: 2024-04-01 | End: 2024-04-01 | Stop reason: HOSPADM

## 2024-04-01 RX ORDER — SODIUM CHLORIDE 9 MG/ML
100 INJECTION, SOLUTION INTRAVENOUS CONTINUOUS
Status: DISCONTINUED | OUTPATIENT
Start: 2024-04-01 | End: 2024-04-01 | Stop reason: HOSPADM

## 2024-04-01 RX ORDER — ALBUTEROL SULFATE 0.83 MG/ML
2.5 SOLUTION RESPIRATORY (INHALATION) ONCE AS NEEDED
Status: DISCONTINUED | OUTPATIENT
Start: 2024-04-01 | End: 2024-04-01 | Stop reason: HOSPADM

## 2024-04-01 RX ORDER — HYDRALAZINE HYDROCHLORIDE 20 MG/ML
5 INJECTION INTRAMUSCULAR; INTRAVENOUS EVERY 30 MIN PRN
Status: DISCONTINUED | OUTPATIENT
Start: 2024-04-01 | End: 2024-04-01 | Stop reason: HOSPADM

## 2024-04-01 RX ADMIN — SODIUM CHLORIDE, SODIUM LACTATE, POTASSIUM CHLORIDE, AND CALCIUM CHLORIDE: .6; .31; .03; .02 INJECTION, SOLUTION INTRAVENOUS at 12:00

## 2024-04-01 RX ADMIN — DEXAMETHASONE SODIUM PHOSPHATE 10 MG: 10 INJECTION, SOLUTION INTRAMUSCULAR; INTRAVENOUS at 12:03

## 2024-04-01 RX ADMIN — LIDOCAINE HYDROCHLORIDE 60 MG: 20 INJECTION, SOLUTION INFILTRATION; PERINEURAL at 12:03

## 2024-04-01 RX ADMIN — FENTANYL CITRATE 50 MCG: 50 INJECTION, SOLUTION INTRAMUSCULAR; INTRAVENOUS at 12:03

## 2024-04-01 RX ADMIN — FENTANYL CITRATE 50 MCG: 50 INJECTION, SOLUTION INTRAMUSCULAR; INTRAVENOUS at 12:12

## 2024-04-01 RX ADMIN — PROPOFOL 200 MG: 10 INJECTION, EMULSION INTRAVENOUS at 12:03

## 2024-04-01 RX ADMIN — MIDAZOLAM 2 MG: 1 INJECTION INTRAMUSCULAR; INTRAVENOUS at 12:03

## 2024-04-01 RX ADMIN — CIPROFLOXACIN 400 MG: 400 INJECTION, SOLUTION INTRAVENOUS at 12:11

## 2024-04-01 RX ADMIN — KETOROLAC TROMETHAMINE 30 MG: 30 INJECTION, SOLUTION INTRAMUSCULAR; INTRAVENOUS at 12:12

## 2024-04-01 ASSESSMENT — PAIN - FUNCTIONAL ASSESSMENT
PAIN_FUNCTIONAL_ASSESSMENT: 0-10
PAIN_FUNCTIONAL_ASSESSMENT: UNABLE TO SELF-REPORT
PAIN_FUNCTIONAL_ASSESSMENT: 0-10
PAIN_FUNCTIONAL_ASSESSMENT: 0-10
PAIN_FUNCTIONAL_ASSESSMENT: UNABLE TO SELF-REPORT
PAIN_FUNCTIONAL_ASSESSMENT: 0-10
PAIN_FUNCTIONAL_ASSESSMENT: 0-10

## 2024-04-01 ASSESSMENT — PAIN SCALES - GENERAL
PAINLEVEL_OUTOF10: 0 - NO PAIN

## 2024-04-01 NOTE — ANESTHESIA PROCEDURE NOTES
Airway  Date/Time: 4/1/2024 12:07 PM  Urgency: elective    Airway not difficult    Staffing  Performed: attending   Authorized by: Jackeline Zaidi MD    Performed by: Jackeline Zaidi MD  Patient location during procedure: OR    Indications and Patient Condition  Indications for airway management: anesthesia  Spontaneous Ventilation: absent  Sedation level: deep  Preoxygenated: yes  MILS maintained throughout  Mask difficulty assessment: 0 - not attempted  No planned trial extubation    Final Airway Details  Final airway type: supraglottic airway      Successful airway: unique  Size 4     Number of attempts at approach: 2  Number of other approaches attempted: 0

## 2024-04-01 NOTE — ANESTHESIA PREPROCEDURE EVALUATION
Patient: Jesica Brown    Procedure Information       Date/Time: 04/01/24 8620    Procedure: Cystoscopy with Retrograde Pyelogram (Right)    Location: [facility] OR 08 / [facility]    Surgeons: Jose Martin Lawrence MD            Relevant Problems   Anesthesia (within normal limits)      Cardiac   (+) Hypertension      Pulmonary   (+) Asthma with COPD (chronic obstructive pulmonary disease) (CMS/HCC)   (+) Lung nodule, multiple   (+) Moderate persistent asthma without complication      GI   (+) GERD (gastroesophageal reflux disease)      /Renal   (+) Hydronephrosis      Musculoskeletal   (+) Primary osteoarthritis of both knees       Clinical information reviewed:    Allergies                NPO Detail:  No data recorded     Physical Exam    Airway  Mallampati: II  TM distance: >3 FB  Neck ROM: full     Cardiovascular   Rhythm: regular  Rate: normal     Dental    Pulmonary   Breath sounds clear to auscultation     Abdominal   Abdomen: soft             Anesthesia Plan    History of general anesthesia?: yes  History of complications of general anesthesia?: no    ASA 3     general     intravenous induction   Postoperative administration of opioids is intended.  Anesthetic plan and risks discussed with patient.    Plan discussed with CAA, CRNA and attending.

## 2024-04-01 NOTE — ANESTHESIA POSTPROCEDURE EVALUATION
Patient: Jesica Brown    Procedure Summary       Date: 04/01/24 Room / Location: [facility] OR 08 / [facility]    Anesthesia Start: 1200 Anesthesia Stop: 1233    Procedure: Cystoscopy with Retrograde Pyelogram (Right) Diagnosis:       Other hydronephrosis      (Other hydronephrosis [N13.39])    Surgeons: Jose Martin Lawrence MD Responsible Provider: Jackeline Zaidi MD    Anesthesia Type: general ASA Status: 3            Anesthesia Type: general    Vitals Value Taken Time   /71 04/01/24 1233   Temp 36 04/01/24 1233   Pulse 64 04/01/24 1233   Resp 18 04/01/24 1233   SpO2 94 % 04/01/24 1232   Vitals shown include unvalidated device data.    Anesthesia Post Evaluation    Patient location during evaluation: PACU  Patient participation: waiting for patient participation  Level of consciousness: sedated  Pain management: adequate  Airway patency: patent  Cardiovascular status: acceptable  Respiratory status: acceptable  Hydration status: acceptable  Postoperative Nausea and Vomiting: none        There were no known notable events for this encounter.

## 2024-04-01 NOTE — DISCHARGE INSTRUCTIONS
[facility]    DISCHARGE INSTRUCTIONS     Cystoscopy    Jesica Brown      Call [phone] during regular daytime business hours (8:00 am - 5:00 pm) and [phone] after 5:00 pm ask for the Urology resident with any questions or concerns.      If it is a life-threatening situation, proceed to the nearest emergency department.        Follow-up appointment:  Please call to arrange follow up in 2 weeks.     Thank you for the opportunity to care for you today.  Your health and healing are very important to us.  We hope we made you feel as comfortable as possible and are committed to your recovery and continued well-being.      The following is a brief overview of your cystoscopy procedure today. Some of the information contained on this summary may be confidential.  This information should be kept in your records and should be shared with your regular doctor.    Physicians:   Dr. Lawrence      Procedure performed: cystoscopy (looked inside your bladder)      What to Expect During your Recovery and Home Care  Anesthesia Side Effects   You received anesthesia today.  You may feel sleepy, tired, or have a sore throat.   You may also feel drowsiness, dizziness, or inability to think clearly.  For your safety, do not drive, drink alcoholic beverages, take any unprescribed medication or make any important decisions for 24 hours.  A responsible adult should be with you for 24 hours.        Activity and Recovery    No heavy lifting today. Rest for the next 24 hours.       Pain Control  Unfortunately, you may experience pain after your procedure.  Frequency and urgency to urinate and mild discomfort are expected. Adequate pain management can include alternative measures to help ease your pain and that can include over the counter Tylenol or ibuprofen which can be taken as prescribed as needed for breakthrough pain. Do not take more than 4,000mg of Tylenol in a 24-hour period.        Nausea/Vomiting   Clear liquids  are best tolerated at first. Start slow, advance your diet as tolerated to normal foods. Avoid spicy, greasy, heavy foods at first. Also, you may feel nauseous or like you need to vomit if you take any type of medication on an empty stomach.  Call your physician if you are unable to eat or drink and have persistent vomiting.    Signs of Bleeding   You are going to have some blood in your urine. Your urine will be light pink to yellow. If urine becomes thick dark ching red, has large clots or you are unable to urinate, please notify your physician.    Treatment/wound care:   Keep area(s) clean and dry.   It is okay to shower 24 hours after time of surgery.      Signs of Infection  Signs of infection can include fever, drainage(green/yellow), chills, burning sensation with passing of urine, or severe abdominal pain.  If you see any of these occur, please contact your doctor's office at [phone].  Any fever higher than 100.4, especially if associated with an ill feeling, abdominal pain, chills, or nausea should be reported to your surgeon.        Assist in bowel movements/urination  Increase fiber in diet  Increase water (6 to 8 glasses)  Increase walking   Urination should occur within 6 hours of anesthesia  If you have tried these methods and your bladder still feels full and you cannot use the bathroom, please go to your nearest Emergency room/contact your physician.    Additional Instructions:   Increase water intake for the next 24 hours to help flush out our urinary system.

## 2024-04-01 NOTE — OP NOTE
Cystoscopy with Retrograde Pyelogram (R) Operative Note     Date: 2024  OR Location: [facility] OR    Name: Jesica Brown, : 1960, Age: 63 y.o., MRN: [id number], Sex: female    Diagnosis  Pre-op Diagnosis     * Other hydronephrosis [N13.39] Post-op Diagnosis     * Other hydronephrosis [N13.39]     Procedures  Cystoscopy with Retrograde Pyelogram  37126 - NM CYSTO BLADDER W/URETERAL CATHETERIZATION    Cystoscopy, right retrograde pyelogram  Surgeons      * Jose Martin Lawrence - Primary    Resident/Fellow/Other Assistant:  Surgeon(s) and Role:     * Gagan Hedrick MD - Resident - Assisting    Procedure Summary  Anesthesia: General  ASA: III  Anesthesia Staff: Anesthesiologist: Jackeline Zaidi MD  CRNA: CLARICE Greenberg-CRNA  Estimated Blood Loss: 0mL  Intra-op Medications: Administrations occurring from 1240 to 1355 on 24:  * No intraprocedure medications in log *    Specimen: No specimens collected     Staff:   Circulator: Kimberly Nelson RN  Relief Circulator: Irma Castellanos RN  Scrub Person: Flores Charlton; Freda Wood      Drains and/or Catheters: * None in log *    Tourniquet Times: none      Implants: none    Findings: short right UPJ obstruction, amenable for pyeloplasty    Indications: Jesica Brown is an 63 y.o. female who is having surgery for Other hydronephrosis [N13.39].     The patient was seen in the preoperative area. The risks, benefits, complications, treatment options, non-operative alternatives, expected recovery and outcomes were discussed with the patient. The possibilities of reaction to medication, pulmonary aspiration, injury to surrounding structures, bleeding, recurrent infection, the need for additional procedures, failure to diagnose a condition, and creating a complication requiring transfusion or operation were discussed with the patient. The patient concurred with the proposed plan, giving informed consent.  The site of surgery was properly noted/marked if  necessary per policy. The patient has been actively warmed in preoperative area. Preoperative antibiotics have been ordered and given within 2 hours of incision. Venous thrombosis prophylaxis have been ordered including bilateral sequential compression devices    Procedure Details:   After informed consent was obtained, the patient was taken to the operating room.  A time-out was performed where the patient and procedure were identified in the presence of Nursing, Anesthesia, and surgical staff.  After the placement of lines and monitors, general anesthesia was induced.  Prophylactic antibiotics were administered.  Sequential compressive devices were placed on both lower extremities.  The patient was placed in the dorsal lithotomy position. Patient was prepped and draped in a standard sterile fashion.    A rigid cystoscope was introduced per urethra.  Inspection of the urethra revealed no strictures, tumors, or lesions. The bladder was then entered and the ureteral orifices were noted in the normal anatomic positions.  The bladder mucosa was inspected in its entirety.  There was no trabeculation.  No tumors, lesions, or stones were noted.      A 5Fr catheter was passed into the right UO over a wire. We then removed the wire and obtained a retrograde pyelogram. Ureter with some J-hooking in the distal-mid ureter and with sharp tapering at the right UPJ indicative of a short UPJO. Moderate hydronephrosis similar to CTU. We attempted to pass the 5Fr to the pelvis to drain the urine but could not advance this into the pelvis despite use of a glide wire.    Bladder was drained. This concluded the procedure. Patient was awoken form anesthesia without complication and was transferred to PACU in stable condition.   Complications:  None; patient tolerated the procedure well.    Disposition: PACU - hemodynamically stable.  Condition: stable       Attending Attestation:   Gagan Morin MD for   Jose Martin Lawrence  Phone  Number: [phone]

## 2024-04-02 ENCOUNTER — PREP FOR PROCEDURE (OUTPATIENT)
Dept: UROLOGY | Facility: HOSPITAL | Age: 64
End: 2024-04-02
Payer: COMMERCIAL

## 2024-04-02 DIAGNOSIS — N13.5 UPJ OBSTRUCTION, ACQUIRED: Primary | ICD-10-CM

## 2024-04-02 RX ORDER — CIPROFLOXACIN 2 MG/ML
400 INJECTION, SOLUTION INTRAVENOUS ONCE
Status: CANCELLED | OUTPATIENT
Start: 2024-04-02 | End: 2024-04-02

## 2024-04-02 RX ORDER — SODIUM CHLORIDE 9 MG/ML
100 INJECTION, SOLUTION INTRAVENOUS CONTINUOUS
Status: CANCELLED | OUTPATIENT
Start: 2024-04-02

## 2024-04-03 ENCOUNTER — HOSPITAL ENCOUNTER (OUTPATIENT)
Dept: GASTROENTEROLOGY | Facility: EXTERNAL LOCATION | Age: 64
Discharge: HOME | End: 2024-04-03
Payer: COMMERCIAL

## 2024-04-03 ENCOUNTER — ANESTHESIA (OUTPATIENT)
Dept: GASTROENTEROLOGY | Facility: EXTERNAL LOCATION | Age: 64
End: 2024-04-03

## 2024-04-03 VITALS
TEMPERATURE: 98.1 F | DIASTOLIC BLOOD PRESSURE: 63 MMHG | BODY MASS INDEX: 22.45 KG/M2 | OXYGEN SATURATION: 97 % | HEART RATE: 65 BPM | SYSTOLIC BLOOD PRESSURE: 97 MMHG | RESPIRATION RATE: 25 BRPM | HEIGHT: 62 IN | WEIGHT: 122 LBS

## 2024-04-03 DIAGNOSIS — Z86.010 PERSONAL HISTORY OF COLONIC POLYPS: ICD-10-CM

## 2024-04-03 PROCEDURE — 45380 COLONOSCOPY AND BIOPSY: CPT | Performed by: INTERNAL MEDICINE

## 2024-04-03 PROCEDURE — 88305 TISSUE EXAM BY PATHOLOGIST: CPT | Mod: TC,ELYLAB | Performed by: INTERNAL MEDICINE

## 2024-04-03 PROCEDURE — 88305 TISSUE EXAM BY PATHOLOGIST: CPT | Performed by: STUDENT IN AN ORGANIZED HEALTH CARE EDUCATION/TRAINING PROGRAM

## 2024-04-03 RX ORDER — SODIUM CHLORIDE 9 MG/ML
20 INJECTION, SOLUTION INTRAVENOUS CONTINUOUS
Status: CANCELLED | OUTPATIENT
Start: 2024-04-03

## 2024-04-03 RX ORDER — SODIUM CHLORIDE 9 MG/ML
INJECTION, SOLUTION INTRAVENOUS CONTINUOUS PRN
Status: DISCONTINUED | OUTPATIENT
Start: 2024-04-03 | End: 2024-04-03

## 2024-04-03 RX ORDER — ONDANSETRON HYDROCHLORIDE 2 MG/ML
4 INJECTION, SOLUTION INTRAVENOUS ONCE AS NEEDED
Status: CANCELLED | OUTPATIENT
Start: 2024-04-03

## 2024-04-03 RX ORDER — PROPOFOL 10 MG/ML
INJECTION, EMULSION INTRAVENOUS AS NEEDED
Status: DISCONTINUED | OUTPATIENT
Start: 2024-04-03 | End: 2024-04-03

## 2024-04-03 RX ADMIN — PROPOFOL 20 MG: 10 INJECTION, EMULSION INTRAVENOUS at 09:39

## 2024-04-03 RX ADMIN — PROPOFOL 20 MG: 10 INJECTION, EMULSION INTRAVENOUS at 09:35

## 2024-04-03 RX ADMIN — SODIUM CHLORIDE: 9 INJECTION, SOLUTION INTRAVENOUS at 09:30

## 2024-04-03 RX ADMIN — PROPOFOL 20 MG: 10 INJECTION, EMULSION INTRAVENOUS at 09:43

## 2024-04-03 RX ADMIN — PROPOFOL 100 MG: 10 INJECTION, EMULSION INTRAVENOUS at 09:33

## 2024-04-03 RX ADMIN — PROPOFOL 20 MG: 10 INJECTION, EMULSION INTRAVENOUS at 09:41

## 2024-04-03 SDOH — HEALTH STABILITY: MENTAL HEALTH: CURRENT SMOKER: 0

## 2024-04-03 ASSESSMENT — PAIN - FUNCTIONAL ASSESSMENT
PAIN_FUNCTIONAL_ASSESSMENT: 0-10

## 2024-04-03 ASSESSMENT — COLUMBIA-SUICIDE SEVERITY RATING SCALE - C-SSRS
1. IN THE PAST MONTH, HAVE YOU WISHED YOU WERE DEAD OR WISHED YOU COULD GO TO SLEEP AND NOT WAKE UP?: NO
6. HAVE YOU EVER DONE ANYTHING, STARTED TO DO ANYTHING, OR PREPARED TO DO ANYTHING TO END YOUR LIFE?: NO
2. HAVE YOU ACTUALLY HAD ANY THOUGHTS OF KILLING YOURSELF?: NO

## 2024-04-03 ASSESSMENT — PAIN SCALES - GENERAL
PAINLEVEL_OUTOF10: 0 - NO PAIN
PAIN_LEVEL: 0
PAINLEVEL_OUTOF10: 0 - NO PAIN

## 2024-04-03 NOTE — SIGNIFICANT EVENT
Physician at bedside to discuss procedure results with patient  Friend updated at bedside  Tolerating PO fluids

## 2024-04-03 NOTE — H&P
Outpatient Hospital Procedure    Patient Profile-Procedures  Initial Info  Patient Demographics  Name eJsica Brown  Date of Birth 1960  MRN [id number]  Address   [address] [address] [facility] [id number] [facility] [address]    Primary Phone Number [phone]  Secondary Phone Number    Marie Catalan    Procedures   Colonoscopy      Indication:  Surveillance - fair prep 2023 - TA x2    Primary contact name and number   Extended Emergency Contact Information  Primary Emergency Contact: CHANO RODARTE  Mobile Phone: [phone]  Relation: Daughter  Preferred language: English   needed? No  Secondary Emergency Contact: Dez Rodarte   [place]  Mobile Phone: [phone]  Relation: Son    General Health  Weight   Vitals:    04/03/24 0917   Weight: 55.3 kg (122 lb)     BMI Body mass index is 22.31 kg/m².    Allergies  Allergies   Allergen Reactions    Lactose Unknown     Intolerance    Lisinopril Cough    Penicillins Hives       Past Medical History   Past Medical History:   Diagnosis Date    Arthritis     Asthma     COPD (chronic obstructive pulmonary disease) (CMS/[facility])     Well-controlled    Fibroadenoma of right breast     Hypertension     Obstruction of right ureter     Osteoporosis     Personal history of other diseases of the circulatory system 03/18/2022    History of hypertension    Personal history of other diseases of the respiratory system 03/18/2022    History of chronic obstructive lung disease       Provider assessment  Diagnosis  Medication Reviewed - yes  Prior to Admission medications    Medication Sig Start Date End Date Taking? Authorizing Provider   alendronate (Fosamax) 70 mg tablet Take 1 tablet (70 mg) by mouth every 7 days. Take in the morning with a full glass of water, on an empty stomach, and do not take anything else by mouth or lie down for the next 30 min. 9/25/23 9/24/24 Yes Marie Ocampo DO    budesonide-glycopyr-formoterol (Breztri Aerosphere) 160-9-4.8 mcg/actuation HFA aerosol inhaler Inhale 2 puffs 2 times a day. 9/25/23 9/24/24 Yes Marie Ocampo DO   calcium carbonate-vitamin D3 500 mg-5 mcg (200 unit) tablet Take 1 tablet by mouth 2 times a day.   Yes Historical Provider, MD   fluticasone (Flonase) 50 mcg/actuation nasal spray Administer 1 spray into each nostril once daily. Shake gently. Before first use, prime pump. After use, clean tip and replace cap.   Yes Historical Provider, MD   Lactobacillus acidophilus (PROBIOTIC ORAL)    Yes Historical Provider, MD   multivit-min/ferrous fumarate (MULTI VITAMIN ORAL) Take 1 tablet daily   Yes Historical Provider, MD   omeprazole (PriLOSEC) 40 mg DR capsule Take 1 capsule (40 mg) by mouth once daily in the morning. Take before meals. Do not crush or chew. 9/25/23 9/24/24 Yes Marie Ocampo DO   phenazopyridine (Pyridium) 200 mg tablet Take 1 tablet (200 mg) by mouth 3 times a day as needed for bladder spasms. 4/1/24  Yes Gagan Hedrick MD   tamsulosin (Flomax) 0.4 mg 24 hr capsule Take 1 capsule (0.4 mg) by mouth once daily. 9/25/23 9/24/24 Yes Marie Ocampo DO   telmisartan (MIcarDIS) 40 mg tablet Take 1 tablet (40 mg) by mouth once daily. 9/25/23 9/24/24 Yes Marie Ocampo DO   traZODone (Desyrel) 50 mg tablet Take 1 tablet (50 mg) by mouth once daily at bedtime. 9/25/23 9/24/24 Yes Marie Ocampo DO   albuterol 90 mcg/actuation inhaler Inhale 2 puffs every 6 hours if needed for wheezing.  Patient not taking: Reported on 4/1/2024 9/25/23   Marie Ocampo DO   cycloSPORINE 0.05 % drops Administer 1 drop into both eyes in the morning and at bedtime.    Historical Provider, MD   famotidine (Pepcid) 40 mg tablet Take 1 tablet (40 mg) by mouth once daily at bedtime.  Patient not taking: Reported on 4/1/2024 11/13/23 11/12/24  Britt Steel PA-C       This is my H&P    Physical Exam  Physical  Exam  Constitutional:       Comments: Awake   HENT:      Head: Normocephalic.   Cardiovascular:      Rate and Rhythm: Normal rate and regular rhythm.   Pulmonary:      Effort: Pulmonary effort is normal.      Breath sounds: Normal breath sounds.   Abdominal:      General: Bowel sounds are normal.      Palpations: Abdomen is soft.   Neurological:      Mental Status: She is alert.   Psychiatric:         Mood and Affect: Mood normal.           Oropharyngeal Classification II (hard and soft palate, upper portion of tonsils anduvula visible)  ASA PS Classification 2  Sedation Plan Deep  Procedure Plan - pre-procedural (re)assesment completed by physician:  discharge/transfer patient when discharge criteria met    Yasmine Bernard MD  4/3/2024 9:48 AM

## 2024-04-03 NOTE — ANESTHESIA PREPROCEDURE EVALUATION
Patient: Jesica Brown    Procedure Information       Date/Time: 04/03/24 0930    Scheduled providers: Yasmine ULRICH MD    Procedure: COLONOSCOPY    Location: [facility]            Relevant Problems   Anesthesia (within normal limits)      Cardiac   (+) Hypertension      Pulmonary   (+) Asthma with COPD (chronic obstructive pulmonary disease) (CMS/HCC)   (+) Lung nodule, multiple   (+) Moderate persistent asthma without complication      Neuro (within normal limits)      GI   (+) GERD (gastroesophageal reflux disease)      /Renal   (+) Hydronephrosis      Liver (within normal limits)      Endocrine (within normal limits)      Hematology (within normal limits)      Musculoskeletal   (+) Primary osteoarthritis of both knees      ID (within normal limits)      Skin (within normal limits)      GYN (within normal limits)       Clinical information reviewed:   Tobacco  Allergies  Meds   Med Hx  Surg Hx  OB Status  Fam Hx  Soc   Hx        NPO Detail:  NPO/Void Status  Date of Last Liquid: 04/02/24  Time of Last Liquid: 2300  Date of Last Solid: 03/31/24  Time of Last Solid: 1800         Physical Exam    Airway  Mallampati: II  TM distance: >3 FB  Neck ROM: full     Cardiovascular - normal exam     Dental - normal exam     Pulmonary - normal exam     Abdominal - normal exam         Anesthesia Plan    History of general anesthesia?: yes  History of complications of general anesthesia?: no    ASA 3     MAC     The patient is not a current smoker.  Patient was not previously instructed to abstain from smoking on day of procedure.  Patient did not smoke on day of procedure.    Anesthetic plan and risks discussed with patient.  Use of blood products discussed with patient who consented to blood products.    Plan discussed with CRNA.

## 2024-04-03 NOTE — DISCHARGE INSTRUCTIONS
Patient Instructions Post Endoscopy Procedure      The anesthetics, sedatives or narcotics which were given to you today will be acting in your body for the next 24 hours, so you might feel a little sleepy or groggy.  This feeling should slowly wear off. Carefully read and follow the instructions.     You received sedation today:  - Do not drive or operate any machinery or power tools of any kind.   - No alcoholic beverages today, not even beer or wine.  - Do not make any important decisions or sign any legal documents.  - No over the counter medications that contain alcohol or that may cause drowsiness.    While it is common to experience mild to moderate abdominal distention, gas, or belching after your procedure, if any of these symptoms occur following discharge from the GI Lab or within one week of having your procedure, call the [facility] to be advised whether a visit to your nearest Urgent Care or Emergency Department is indicated.  Take this paper with you if you go.   - If you develop an allergic reaction to the medications that were given during your procedure such as difficulty breathing, rash, hives, severe nausea, vomiting or lightheadedness.  - If you experience chest pain, shortness of breath, severe abdominal pain, fevers and chills.  -If you develop signs and symptoms of bleeding such as blood in your spit, if your stools turn black, tarry, or bloody  - If you have not urinated within 8 hours following your procedure.  - If your IV site becomes painful, red, inflamed, or looks infected.    If you received a biopsy/polypectomy the following instructions apply below:  __ Do not use non-steroidal medications or anti-coagulants for one week following your procedure. (Examples of these types of medications are: Advil, Arthrotec, Aleve, Coumadin, Ecotrin, Heparin, Ibuprofen, Indocin, Motrin, Naprosyn, Nuprin, Plavix, Vioxx, and Voltarin, or their generic forms.  This list is not  all-inclusive.  Check with your physician or pharmacist before resuming medications.)   __ Eat a soft diet today.  Avoid foods that are poorly digested for the next 24 hours.  These foods would include: nuts, beans, lettuce, red meats, and fried foods. Start with liquids and advance your diet as tolerated, gradually work up to eating solids.   __ You can restart your ASA tomorrow  __ You can resume your anticoagulation therapy -     Your physician recommends the additional following instructions:    -You have a contact number available for emergencies. The signs and symptoms of potential delayed complications were discussed with you. You may return to normal activities tomorrow.  -Resume your previous diet or other if specified.  -Continue your present medications.   -We are waiting for your pathology results, if applicable. The results will be available in [employer]. I will send you a message with any recommendations.  -The findings and recommendations have been discussed with you and/or family.  -Please see Medication Reconciliation Form for new medication/medications prescribed.     If you experience any problems or have any questions following discharge from the GI Lab, please call: [phone] from 7 am- 4:30 pm.  In the event of an emergency please go to the closest Emergency Department or call Dr. Bernard at [phone]

## 2024-04-03 NOTE — ANESTHESIA POSTPROCEDURE EVALUATION
Patient: Jesica Brown    Procedure Summary       Date: 04/03/24 Room / Location: [facility]    Anesthesia Start: 0930 Anesthesia Stop: 0952    Procedure: COLONOSCOPY Diagnosis: Personal history of colonic polyps    Scheduled Providers: Yasmine ULRICH MD Responsible Provider: RAJESH Hahn    Anesthesia Type: MAC ASA Status: 3            Anesthesia Type: MAC    Vitals Value Taken Time   BP 99/62 04/03/24 0950   Temp 36.7 °C (98.1 °F) 04/03/24 0950   Pulse 70 04/03/24 0950   Resp 10 04/03/24 0950   SpO2 98 % 04/03/24 0950       Anesthesia Post Evaluation    Patient location during evaluation: PACU  Patient participation: complete - patient participated  Level of consciousness: awake and alert  Pain score: 0  Pain management: adequate  Airway patency: patent  Cardiovascular status: acceptable  Respiratory status: acceptable  Hydration status: acceptable  Postoperative Nausea and Vomiting: none        There were no known notable events for this encounter.

## 2024-04-09 LAB
LABORATORY COMMENT REPORT: NORMAL
PATH REPORT.FINAL DX SPEC: NORMAL
PATH REPORT.GROSS SPEC: NORMAL
PATH REPORT.TOTAL CANCER: NORMAL

## 2024-04-15 ENCOUNTER — APPOINTMENT (OUTPATIENT)
Dept: PULMONOLOGY | Facility: HOSPITAL | Age: 64
End: 2024-04-15
Payer: COMMERCIAL

## 2024-04-15 DIAGNOSIS — R30.0 DYSURIA: ICD-10-CM

## 2024-04-16 ENCOUNTER — LAB (OUTPATIENT)
Dept: LAB | Facility: LAB | Age: 64
End: 2024-04-16
Payer: COMMERCIAL

## 2024-04-16 DIAGNOSIS — R30.0 DYSURIA: ICD-10-CM

## 2024-04-16 LAB
APPEARANCE UR: CLEAR
BILIRUB UR STRIP.AUTO-MCNC: NEGATIVE MG/DL
COLOR UR: YELLOW
GLUCOSE UR STRIP.AUTO-MCNC: NEGATIVE MG/DL
KETONES UR STRIP.AUTO-MCNC: NEGATIVE MG/DL
LEUKOCYTE ESTERASE UR QL STRIP.AUTO: ABNORMAL
NITRITE UR QL STRIP.AUTO: NEGATIVE
PH UR STRIP.AUTO: 5 [PH]
PROT UR STRIP.AUTO-MCNC: NEGATIVE MG/DL
RBC # UR STRIP.AUTO: NEGATIVE /UL
RBC #/AREA URNS AUTO: NORMAL /HPF
SP GR UR STRIP.AUTO: 1.01
SQUAMOUS #/AREA URNS AUTO: NORMAL /HPF
UROBILINOGEN UR STRIP.AUTO-MCNC: <2 MG/DL
WBC #/AREA URNS AUTO: NORMAL /HPF

## 2024-04-16 PROCEDURE — 81001 URINALYSIS AUTO W/SCOPE: CPT

## 2024-04-23 ENCOUNTER — APPOINTMENT (OUTPATIENT)
Dept: UROLOGY | Facility: CLINIC | Age: 64
End: 2024-04-23
Payer: COMMERCIAL

## 2024-05-06 ENCOUNTER — PRE-ADMISSION TESTING (OUTPATIENT)
Dept: PREADMISSION TESTING | Facility: HOSPITAL | Age: 64
End: 2024-05-06
Payer: COMMERCIAL

## 2024-05-06 ENCOUNTER — LAB (OUTPATIENT)
Dept: LAB | Facility: LAB | Age: 64
End: 2024-05-06
Payer: COMMERCIAL

## 2024-05-06 VITALS
RESPIRATION RATE: 16 BRPM | OXYGEN SATURATION: 99 % | DIASTOLIC BLOOD PRESSURE: 83 MMHG | TEMPERATURE: 99.3 F | WEIGHT: 128.31 LBS | HEIGHT: 62 IN | BODY MASS INDEX: 23.61 KG/M2 | SYSTOLIC BLOOD PRESSURE: 110 MMHG | HEART RATE: 61 BPM

## 2024-05-06 DIAGNOSIS — Z01.818 PREOP EXAMINATION: Primary | ICD-10-CM

## 2024-05-06 DIAGNOSIS — N13.5 UPJ OBSTRUCTION, ACQUIRED: ICD-10-CM

## 2024-05-06 LAB
ANION GAP SERPL CALC-SCNC: 11 MMOL/L (ref 10–20)
BUN SERPL-MCNC: 14 MG/DL (ref 6–23)
CALCIUM SERPL-MCNC: 9.1 MG/DL (ref 8.6–10.3)
CHLORIDE SERPL-SCNC: 106 MMOL/L (ref 98–107)
CO2 SERPL-SCNC: 27 MMOL/L (ref 21–32)
CREAT SERPL-MCNC: 0.67 MG/DL (ref 0.5–1.05)
EGFRCR SERPLBLD CKD-EPI 2021: >90 ML/MIN/1.73M*2
ERYTHROCYTE [DISTWIDTH] IN BLOOD BY AUTOMATED COUNT: 13.3 % (ref 11.5–14.5)
GLUCOSE SERPL-MCNC: 98 MG/DL (ref 74–99)
HCT VFR BLD AUTO: 38.5 % (ref 36–46)
HGB BLD-MCNC: 12.6 G/DL (ref 12–16)
MCH RBC QN AUTO: 30.7 PG (ref 26–34)
MCHC RBC AUTO-ENTMCNC: 32.7 G/DL (ref 32–36)
MCV RBC AUTO: 94 FL (ref 80–100)
NRBC BLD-RTO: 0 /100 WBCS (ref 0–0)
PLATELET # BLD AUTO: 292 X10*3/UL (ref 150–450)
POTASSIUM SERPL-SCNC: 4.4 MMOL/L (ref 3.5–5.3)
RBC # BLD AUTO: 4.11 X10*6/UL (ref 4–5.2)
SODIUM SERPL-SCNC: 140 MMOL/L (ref 136–145)
WBC # BLD AUTO: 6.5 X10*3/UL (ref 4.4–11.3)

## 2024-05-06 PROCEDURE — 36415 COLL VENOUS BLD VENIPUNCTURE: CPT

## 2024-05-06 PROCEDURE — 99203 OFFICE O/P NEW LOW 30 MIN: CPT | Performed by: NURSE PRACTITIONER

## 2024-05-06 PROCEDURE — 85027 COMPLETE CBC AUTOMATED: CPT

## 2024-05-06 PROCEDURE — 87086 URINE CULTURE/COLONY COUNT: CPT

## 2024-05-06 PROCEDURE — 80048 BASIC METABOLIC PNL TOTAL CA: CPT

## 2024-05-06 RX ORDER — FAMOTIDINE 40 MG/1
40 TABLET, FILM COATED ORAL DAILY PRN
COMMUNITY

## 2024-05-06 ASSESSMENT — DUKE ACTIVITY SCORE INDEX (DASI)
CAN YOU WALK INDOORS, SUCH AS AROUND YOUR HOUSE: YES
CAN YOU PARTICIPATE IN STRENOUS SPORTS LIKE SWIMMING, SINGLES TENNIS, FOOTBALL, BASKETBALL, OR SKIING: NO
CAN YOU WALK A BLOCK OR TWO ON LEVEL GROUND: YES
CAN YOU HAVE SEXUAL RELATIONS: NO
CAN YOU CLIMB A FLIGHT OF STAIRS OR WALK UP A HILL: YES
CAN YOU TAKE CARE OF YOURSELF (EAT, DRESS, BATHE, OR USE TOILET): YES
DASI METS SCORE: 6.6
TOTAL_SCORE: 31.45
CAN YOU DO LIGHT WORK AROUND THE HOUSE LIKE DUSTING OR WASHING DISHES: YES
CAN YOU PARTICIPATE IN MODERATE RECREATIONAL ACTIVITIES LIKE GOLF, BOWLING, DANCING, DOUBLES TENNIS OR THROWING A BASEBALL OR FOOTBALL: NO
CAN YOU RUN A SHORT DISTANCE: NO
CAN YOU DO HEAVY WORK AROUND THE HOUSE LIKE SCRUBBING FLOORS OR LIFTING AND MOVING HEAVY FURNITURE: YES
CAN YOU DO MODERATE WORK AROUND THE HOUSE LIKE VACUUMING, SWEEPING FLOORS OR CARRYING GROCERIES: YES
CAN YOU DO YARD WORK LIKE RAKING LEAVES, WEEDING OR PUSHING A MOWER: YES

## 2024-05-06 ASSESSMENT — LIFESTYLE VARIABLES: SMOKING_STATUS: NONSMOKER

## 2024-05-06 ASSESSMENT — PAIN - FUNCTIONAL ASSESSMENT: PAIN_FUNCTIONAL_ASSESSMENT: 0-10

## 2024-05-06 ASSESSMENT — PAIN SCALES - GENERAL: PAINLEVEL_OUTOF10: 0 - NO PAIN

## 2024-05-06 ASSESSMENT — ACTIVITIES OF DAILY LIVING (ADL): ADL_SCORE: 0

## 2024-05-06 NOTE — PREPROCEDURE INSTRUCTIONS
Medication List            Accurate as of May 6, 2024 10:35 AM. Always use your most recent med list.                alendronate 70 mg tablet  Commonly known as: Fosamax  Take 1 tablet (70 mg) by mouth every 7 days. Take in the morning with a full glass of water, on an empty stomach, and do not take anything else by mouth or lie down for the next 30 min.  Medication Adjustments for Surgery: Continue until night before surgery     Breztri Aerosphere 160-9-4.8 mcg/actuation HFA aerosol inhaler  Generic drug: budesonide-glycopyr-formoterol  Inhale 2 puffs 2 times a day.  Medication Adjustments for Surgery: Take morning of surgery with sip of water, no other fluids     calcium carbonate-vitamin D3 500 mg-5 mcg (200 unit) tablet  Medication Adjustments for Surgery: Stop 7 days before surgery     cycloSPORINE 0.05 % drops  Medication Adjustments for Surgery: Take morning of surgery with sip of water, no other fluids     famotidine 40 mg tablet  Commonly known as: Pepcid  Medication Adjustments for Surgery: Take morning of surgery with sip of water, no other fluids     fluticasone 50 mcg/actuation nasal spray  Commonly known as: Flonase     MULTI VITAMIN ORAL  Medication Adjustments for Surgery: Stop 7 days before surgery     omeprazole 40 mg DR capsule  Commonly known as: PriLOSEC  Take 1 capsule (40 mg) by mouth once daily in the morning. Take before meals. Do not crush or chew.  Medication Adjustments for Surgery: Take morning of surgery with sip of water, no other fluids     phenazopyridine 200 mg tablet  Commonly known as: Pyridium  Take 1 tablet (200 mg) by mouth 3 times a day as needed for bladder spasms.  Medication Adjustments for Surgery: Take morning of surgery with sip of water, no other fluids     PROBIOTIC ORAL  Medication Adjustments for Surgery: Stop 7 days before surgery     tamsulosin 0.4 mg 24 hr capsule  Commonly known as: Flomax  Take 1 capsule (0.4 mg) by mouth once daily.  Medication Adjustments  for Surgery: Take morning of surgery with sip of water, no other fluids     telmisartan 40 mg tablet  Commonly known as: MIcarDIS  Take 1 tablet (40 mg) by mouth once daily.  Medication Adjustments for Surgery: Continue until night before surgery     traZODone 50 mg tablet  Commonly known as: Desyrel  Take 1 tablet (50 mg) by mouth once daily at bedtime.  Medication Adjustments for Surgery: Continue until night before surgery                              PRE-OPERATIVE INSTRUCTIONS    You will receive notification one business day prior to your procedure to confirm your arrival time. It is important that you answer your phone and/or check your messages during this time. If you do not hear from the surgery center by 5 pm. the day before your procedure, please call [phone].     Please enter the building through the Outpatient entrance and take the elevator off the lobby to the 2nd floor then check in at the Outpatient Surgery desk on the 2nd floor.    INSTRUCTIONS:  Talk to your surgeon for instructions if you should stop your aspirin, blood thinner, or diabetes medicines.  DO NOT take any multivitamins or over the counter supplements for 7-10 days before surgery.  If not being admitted, you must have an adult immediately available to drive you home after surgery. We also highly recommend you have someone stay with you for the entire day and night of your surgery.  For children having surgery, a parent or legal guardian must accompany them to the surgery center. If this is not possible, please call [phone] to make additional arrangements.  For adults who are unable to consent or make medical decisions for themselves, a legal guardian or Power of  must accompany them to the surgery center. If this is not possible, please call [phone] to make additional arrangements.  Wear comfortable, loose fitting clothing.  All jewelry and piercings must be removed. If you are unable to remove an item or have a  dermal piercing, please be sure to tell the nurse when you arrive for surgery.  Nail polish and make-up must be removed.  Avoid smoking or consuming alcohol for 24 hours before surgery.  To help prevent infection, please take a shower/bath and wash your hair the night before and/or morning of surgery (or follow other specific bathing instructions provided).    Preoperative Fasting Guidelines    Why must I stop eating and drinking near surgery time?  With sedation, food or liquid in your stomach can enter your lungs causing serious complications  Increases nausea and vomiting    When do I need to stop eating and drinking before my surgery?  Do not eat any solid food after midnight the night before your surgery/procedure unless otherwise instructed by your surgeon.   You may have up to 13.5 ounces of clear liquid until TWO hours before your instructed arrival time to the hospital.  This includes water, black tea/coffee, (no milk or cream) apple juice, and electrolyte drinks (Gatorade).   You may chew gum until TWO hours before your surgery/procedure      If applicable, notify your surgeons office immediately of any new skin changes that occur to the surgical limb.      If you have any questions or concerns, please call Pre-Admission Testing at [phone].

## 2024-05-06 NOTE — PREPROCEDURE INSTRUCTIONS
Medication List            Accurate as of May 6, 2024 10:35 AM. Always use your most recent med list.                alendronate 70 mg tablet  Commonly known as: Fosamax  Take 1 tablet (70 mg) by mouth every 7 days. Take in the morning with a full glass of water, on an empty stomach, and do not take anything else by mouth or lie down for the next 30 min.  Medication Adjustments for Surgery: Continue until night before surgery     Breztri Aerosphere 160-9-4.8 mcg/actuation HFA aerosol inhaler  Generic drug: budesonide-glycopyr-formoterol  Inhale 2 puffs 2 times a day.  Medication Adjustments for Surgery: Take morning of surgery with sip of water, no other fluids     calcium carbonate-vitamin D3 500 mg-5 mcg (200 unit) tablet  Medication Adjustments for Surgery: Stop 7 days before surgery     cycloSPORINE 0.05 % drops  Medication Adjustments for Surgery: Take morning of surgery with sip of water, no other fluids     famotidine 40 mg tablet  Commonly known as: Pepcid  Medication Adjustments for Surgery: Take morning of surgery with sip of water, no other fluids     fluticasone 50 mcg/actuation nasal spray  Commonly known as: Flonase     MULTI VITAMIN ORAL  Medication Adjustments for Surgery: Stop 7 days before surgery     omeprazole 40 mg DR capsule  Commonly known as: PriLOSEC  Take 1 capsule (40 mg) by mouth once daily in the morning. Take before meals. Do not crush or chew.  Medication Adjustments for Surgery: Take morning of surgery with sip of water, no other fluids     phenazopyridine 200 mg tablet  Commonly known as: Pyridium  Take 1 tablet (200 mg) by mouth 3 times a day as needed for bladder spasms.  Medication Adjustments for Surgery: Take morning of surgery with sip of water, no other fluids     PROBIOTIC ORAL  Medication Adjustments for Surgery: Stop 7 days before surgery     tamsulosin 0.4 mg 24 hr capsule  Commonly known as: Flomax  Take 1 capsule (0.4 mg) by mouth once daily.  Medication Adjustments  for Surgery: Take morning of surgery with sip of water, no other fluids     telmisartan 40 mg tablet  Commonly known as: MIcarDIS  Take 1 tablet (40 mg) by mouth once daily.  Medication Adjustments for Surgery: Continue until night before surgery     traZODone 50 mg tablet  Commonly known as: Desyrel  Take 1 tablet (50 mg) by mouth once daily at bedtime.  Medication Adjustments for Surgery: Continue until night before surgery                              NPO Instructions:    PRE-OPERATIVE INSTRUCTIONS    You will receive notification one business day prior to your procedure to confirm your arrival time. It is important that you answer your phone and/or check your messages during this time. If you do not hear from the surgery center by 5 pm. the day before your procedure, please call [phone].     Please enter the building through the Outpatient entrance and take the elevator off the lobby to the 2nd floor then check in at the Outpatient Surgery desk on the 2nd floor.    INSTRUCTIONS:  Talk to your surgeon for instructions if you should stop your aspirin, blood thinner, or diabetes medicines.  DO NOT take any multivitamins or over the counter supplements for 7-10 days before surgery.  If not being admitted, you must have an adult immediately available to drive you home after surgery. We also highly recommend you have someone stay with you for the entire day and night of your surgery.  For children having surgery, a parent or legal guardian must accompany them to the surgery center. If this is not possible, please call [phone] to make additional arrangements.  For adults who are unable to consent or make medical decisions for themselves, a legal guardian or Power of  must accompany them to the surgery center. If this is not possible, please call [phone] to make additional arrangements.  Wear comfortable, loose fitting clothing.  All jewelry and piercings must be removed. If you are unable to  remove an item or have a dermal piercing, please be sure to tell the nurse when you arrive for surgery.  Nail polish and make-up must be removed.  Avoid smoking or consuming alcohol for 24 hours before surgery.  To help prevent infection, please take a shower/bath and wash your hair the night before and/or morning of surgery (or follow other specific bathing instructions provided).    Preoperative Fasting Guidelines    Why must I stop eating and drinking near surgery time?  With sedation, food or liquid in your stomach can enter your lungs causing serious complications  Increases nausea and vomiting    When do I need to stop eating and drinking before my surgery?  Do not eat any solid food after midnight the night before your surgery/procedure unless otherwise instructed by your surgeon.   You may have up to 13.5 ounces of clear liquid until TWO hours before your instructed arrival time to the hospital.  This includes water, black tea/coffee, (no milk or cream) apple juice, and electrolyte drinks (Gatorade).   You may chew gum until TWO hours before your surgery/procedure      If applicable, notify your surgeons office immediately of any new skin changes that occur to the surgical limb.      If you have any questions or concerns, please call Pre-Admission Testing at [phone].

## 2024-05-06 NOTE — CPM/PAT H&P
"CPM/PAT Evaluation       Name: Jesica Brown (Jesica Brown)  /Age: 1960/63 y.o.     In-Person     HPI 64 yo female here for preop evaluation for recurrent UTI's and hydronephrosis. Has chronic weak urinary stream. Has right flank pain on occasion.    Past Medical History:   Diagnosis Date    Arthritis     Asthma ([facility]HS-HCC)     COPD (chronic obstructive pulmonary disease) (Multi)     Well-controlled    Fibroadenoma of right breast     Hypertension     Obstruction of right ureter     Osteoporosis     Personal history of other diseases of the circulatory system 2022    History of hypertension    Personal history of other diseases of the respiratory system 2022    History of chronic obstructive lung disease       Past Surgical History:   Procedure Laterality Date    COLONOSCOPY      CYSTOSCOPY      OTHER SURGICAL HISTORY  2022    [facility] tooth extraction    OTHER SURGICAL HISTORY  2022    Hysterectomy    OTHER SURGICAL HISTORY  2022    Breast augmentation    OTHER SURGICAL HISTORY  2022    Tonsillectomy with adenoidectomy    OTHER SURGICAL HISTORY  2022     section    OTHER SURGICAL HISTORY  2022    Breast biopsy    OTHER SURGICAL HISTORY      Right wrist surgery-\"wire\"       Patient  has no history on file for sexual activity.    Family History   Problem Relation Name Age of Onset    Lung cancer Mother  65 - 69    Hypertension Mother      Other ([facility]) Paternal Cousin         Allergies   Allergen Reactions    Lisinopril Cough, GI Upset and Constipation    Lactose GI Upset    Penicillins Hives and Rash       Prior to Admission medications    Medication Sig Start Date End Date Taking? Authorizing Provider   albuterol 90 mcg/actuation inhaler Inhale 2 puffs every 6 hours if needed for wheezing.  Patient not taking: Reported on 2024   Marie Ocampo DO   alendronate (Fosamax) 70 mg tablet Take 1 tablet (70 mg) by mouth every 7 days. Take " in the morning with a full glass of water, on an empty stomach, and do not take anything else by mouth or lie down for the next 30 min. 9/25/23 9/24/24  Marie Ocampo DO   budesonide-glycopyr-formoterol (Breztri Aerosphere) 160-9-4.8 mcg/actuation HFA aerosol inhaler Inhale 2 puffs 2 times a day. 9/25/23 9/24/24  Marie Ocampo DO   calcium carbonate-vitamin D3 500 mg-5 mcg (200 unit) tablet Take 1 tablet by mouth 2 times a day.    Historical Provider, MD   cycloSPORINE 0.05 % drops Administer 1 drop into both eyes in the morning and at bedtime.    Historical Provider, MD   famotidine (Pepcid) 40 mg tablet Take 1 tablet (40 mg) by mouth once daily at bedtime.  Patient not taking: Reported on 4/1/2024 11/13/23 11/12/24  Britt Steel PA-C   fluticasone (Flonase) 50 mcg/actuation nasal spray Administer 1 spray into each nostril once daily. Shake gently. Before first use, prime pump. After use, clean tip and replace cap.    Historical Provider, MD   Lactobacillus acidophilus (PROBIOTIC ORAL)     Historical Provider, MD   multivit-min/ferrous fumarate (MULTI VITAMIN ORAL) Take 1 tablet daily    Historical Provider, MD   omeprazole (PriLOSEC) 40 mg DR capsule Take 1 capsule (40 mg) by mouth once daily in the morning. Take before meals. Do not crush or chew. 9/25/23 9/24/24  Marie Ocampo DO   phenazopyridine (Pyridium) 200 mg tablet Take 1 tablet (200 mg) by mouth 3 times a day as needed for bladder spasms. 4/1/24   Gagan Hedrick MD   tamsulosin (Flomax) 0.4 mg 24 hr capsule Take 1 capsule (0.4 mg) by mouth once daily. 9/25/23 9/24/24  Marie Ocampo DO   telmisartan (MIcarDIS) 40 mg tablet Take 1 tablet (40 mg) by mouth once daily. 9/25/23 9/24/24  Marie Ocampo DO   traZODone (Desyrel) 50 mg tablet Take 1 tablet (50 mg) by mouth once daily at bedtime. 9/25/23 9/24/24  Marie Ocampo DO      Constitutional: Negative for fever, chills, or sweats   ENMT: glasses. Negative  for nasal discharge, congestion, ear pain, mouth pain, throat pain   Respiratory: Negative for cough, wheezing, shortness of breath   Cardiac: Negative for chest pain, dyspnea on exertion, palpitations   Gastrointestinal: Negative for nausea, vomiting, diarrhea, constipation, abdominal pain  Genitourinary: see hpi  Musculoskeletal: Negative for decreased ROM, pain, swelling, weakness  Neurological: Negative for dizziness, confusion, headache  Psychiatric: Negative for mood changes   Skin: Negative for itching, rash, ulcer    Hematologic/Lymph: Negative for bruising, easy bleeding  Allergic/Immunologic: Negative itching, sneezing, swelling     CONSTITUTIONAL - well nourished, well developed, looks like stated age  SKIN - normal skin color and pigmentation, no rash or lesions  HEAD - no trauma, normocephalic  EYES - glasses, pupils are equal and reactive to light, extraocular muscles are intact  ENT - uvula midline, normal tongue movement and throat normal, no exudate, nasal passage without discharge and patent  NECK - supple without rigidity, full ROM  CHEST - clear to auscultation, no wheezing, no crackles and no rales, good effort  CARDIAC - regular rate and regular rhythm, no skipped beats, no murmur  ABDOMEN - right CVA tenderness, no organomegaly, soft, nontender, nondistended, normal bowel sounds, no guarding/rebound/rigidity   EXTREMITIES - no edema, no deformities  NEUROLOGICAL - normal gait, normal balance, normal motor; alert, oriented and no focal signs  PSYCHIATRIC - alert, pleasant and cordial, age-appropriate  IMMUNOLOGIC - no cervical lymphadenopathy     PAT AIRWAY:   Airway:     Mallampati::  II    Neck ROM::  Full  normal        Visit Vitals  /83   Pulse 61   Temp 37.4 °C (99.3 °F) (Temporal)   Resp 16       EKG: 3/14/2024 sinus bradycardia HR 58 bpm    DASI Risk Score      Flowsheet Row Most Recent Value   DASI SCORE 31.45   METS Score (Will be calculated only when all the questions are  answered) 6.6          Caprini DVT Assessment      Flowsheet Row Most Recent Value   DVT Score 8   Current Status COPD, Major surgery planned, including arthroscopic and laproscopic (1-2 hours)   History Prior major surgery, COPD   Age 60-75 years   BMI 30 or less          Modified Frailty Index      Flowsheet Row Most Recent Value   Modified Frailty Index Calculator .1818          CHADS2 Stroke Risk  Current as of today        N/A 3 to 100%: High Risk   2 to < 3%: Medium Risk   0 to < 2%: Low Risk     Last Change: N/A          This score determines the patient's risk of having a stroke if the patient has atrial fibrillation.        This score is not applicable to this patient. Components are not calculated.          Revised Cardiac Risk Index    No data to display       Apfel Simplified Score    No data to display       Risk Analysis Index Results This Encounter         5/6/2024  1009             [facility] Cancer History: Patient does not indicate history of cancer    Total Risk Analysis Index Score Without Cancer: 19    Total Risk Analysis Index Score: 19          Stop Bang Score      Flowsheet Row Most Recent Value   Do you snore loudly? 0   Do you often feel tired or fatigued after your sleep? 0   Has anyone ever observed you stop breathing in your sleep? 0   Do you have or are you being treated for high blood pressure? 1   Recent BMI (Calculated) 22.3   Is BMI greater than 35 kg/m2? 0=No   Age older than 50 years old? 1=Yes   Is your neck circumference greater than 17 inches (Male) or 16 inches (Female)? 0   Gender - Male 0=No   STOP-BANG Total Score 2            Assessment and Plan:   62 yo female scheduled for right robotic pyeloplasty with Dr. Lawrence 5/20/2024. Labs ordered by surgeon.    See risk scores as previously documented.

## 2024-05-06 NOTE — H&P (VIEW-ONLY)
"CPM/PAT Evaluation       Name: Jesica Brown (Jesica Brown)  /Age: 1960/63 y.o.     In-Person     HPI 62 yo female here for preop evaluation for recurrent UTI's and hydronephrosis. Has chronic weak urinary stream. Has right flank pain on occasion.    Past Medical History:   Diagnosis Date    Arthritis     Asthma (HHS-HCC)     COPD (chronic obstructive pulmonary disease) (Multi)     Well-controlled    Fibroadenoma of right breast     Hypertension     Obstruction of right ureter     Osteoporosis     Personal history of other diseases of the circulatory system 2022    History of hypertension    Personal history of other diseases of the respiratory system 2022    History of chronic obstructive lung disease       Past Surgical History:   Procedure Laterality Date    COLONOSCOPY      CYSTOSCOPY      OTHER SURGICAL HISTORY  2022    [place] tooth extraction    OTHER SURGICAL HISTORY  2022    Hysterectomy    OTHER SURGICAL HISTORY  2022    Breast augmentation    OTHER SURGICAL HISTORY  2022    Tonsillectomy with adenoidectomy    OTHER SURGICAL HISTORY  2022     section    OTHER SURGICAL HISTORY  2022    Breast biopsy    OTHER SURGICAL HISTORY      Right wrist surgery-\"wire\"       Patient  has no history on file for sexual activity.    Family History   Problem Relation Name Age of Onset    Lung cancer Mother  65 - 69    Hypertension Mother      Other ([facility]) Paternal Cousin         Allergies   Allergen Reactions    Lisinopril Cough, GI Upset and Constipation    Lactose GI Upset    Penicillins Hives and Rash       Prior to Admission medications    Medication Sig Start Date End Date Taking? Authorizing Provider   albuterol 90 mcg/actuation inhaler Inhale 2 puffs every 6 hours if needed for wheezing.  Patient not taking: Reported on 2024   Marie Ocampo DO   alendronate (Fosamax) 70 mg tablet Take 1 tablet (70 mg) by mouth every 7 days. Take " in the morning with a full glass of water, on an empty stomach, and do not take anything else by mouth or lie down for the next 30 min. 9/25/23 9/24/24  Marie Ocampo DO   budesonide-glycopyr-formoterol (Breztri Aerosphere) 160-9-4.8 mcg/actuation HFA aerosol inhaler Inhale 2 puffs 2 times a day. 9/25/23 9/24/24  Marie Ocampo DO   calcium carbonate-vitamin D3 500 mg-5 mcg (200 unit) tablet Take 1 tablet by mouth 2 times a day.    Historical Provider, MD   cycloSPORINE 0.05 % drops Administer 1 drop into both eyes in the morning and at bedtime.    Historical Provider, MD   famotidine (Pepcid) 40 mg tablet Take 1 tablet (40 mg) by mouth once daily at bedtime.  Patient not taking: Reported on 4/1/2024 11/13/23 11/12/24  Britt Steel PA-C   fluticasone (Flonase) 50 mcg/actuation nasal spray Administer 1 spray into each nostril once daily. Shake gently. Before first use, prime pump. After use, clean tip and replace cap.    Historical Provider, MD   Lactobacillus acidophilus (PROBIOTIC ORAL)     Historical Provider, MD   multivit-min/ferrous fumarate (MULTI VITAMIN ORAL) Take 1 tablet daily    Historical Provider, MD   omeprazole (PriLOSEC) 40 mg DR capsule Take 1 capsule (40 mg) by mouth once daily in the morning. Take before meals. Do not crush or chew. 9/25/23 9/24/24  Marie Ocampo DO   phenazopyridine (Pyridium) 200 mg tablet Take 1 tablet (200 mg) by mouth 3 times a day as needed for bladder spasms. 4/1/24   Gagan Hedrick MD   tamsulosin (Flomax) 0.4 mg 24 hr capsule Take 1 capsule (0.4 mg) by mouth once daily. 9/25/23 9/24/24  Marie Ocampo DO   telmisartan (MIcarDIS) 40 mg tablet Take 1 tablet (40 mg) by mouth once daily. 9/25/23 9/24/24  Marie Ocampo DO   traZODone (Desyrel) 50 mg tablet Take 1 tablet (50 mg) by mouth once daily at bedtime. 9/25/23 9/24/24  Marie Ocampo DO      Constitutional: Negative for fever, chills, or sweats   ENMT: glasses. Negative  for nasal discharge, congestion, ear pain, mouth pain, throat pain   Respiratory: Negative for cough, wheezing, shortness of breath   Cardiac: Negative for chest pain, dyspnea on exertion, palpitations   Gastrointestinal: Negative for nausea, vomiting, diarrhea, constipation, abdominal pain  Genitourinary: see hpi  Musculoskeletal: Negative for decreased ROM, pain, swelling, weakness  Neurological: Negative for dizziness, confusion, headache  Psychiatric: Negative for mood changes   Skin: Negative for itching, rash, ulcer    Hematologic/Lymph: Negative for bruising, easy bleeding  Allergic/Immunologic: Negative itching, sneezing, swelling     CONSTITUTIONAL - well nourished, well developed, looks like stated age  SKIN - normal skin color and pigmentation, no rash or lesions  HEAD - no trauma, normocephalic  EYES - glasses, pupils are equal and reactive to light, extraocular muscles are intact  ENT - uvula midline, normal tongue movement and throat normal, no exudate, nasal passage without discharge and patent  NECK - supple without rigidity, full ROM  CHEST - clear to auscultation, no wheezing, no crackles and no rales, good effort  CARDIAC - regular rate and regular rhythm, no skipped beats, no murmur  ABDOMEN - right CVA tenderness, no organomegaly, soft, nontender, nondistended, normal bowel sounds, no guarding/rebound/rigidity   EXTREMITIES - no edema, no deformities  NEUROLOGICAL - normal gait, normal balance, normal motor; alert, oriented and no focal signs  PSYCHIATRIC - alert, pleasant and cordial, age-appropriate  IMMUNOLOGIC - no cervical lymphadenopathy     PAT AIRWAY:   Airway:     Mallampati::  II    Neck ROM::  Full  normal        Visit Vitals  /83   Pulse 61   Temp 37.4 °C (99.3 °F) (Temporal)   Resp 16       EKG: 3/14/2024 sinus bradycardia HR 58 bpm    DASI Risk Score      Flowsheet Row Most Recent Value   DASI SCORE 31.45   METS Score (Will be calculated only when all the questions are  answered) 6.6          Caprini DVT Assessment      Flowsheet Row Most Recent Value   DVT Score 8   Current Status COPD, Major surgery planned, including arthroscopic and laproscopic (1-2 hours)   History Prior major surgery, COPD   Age 60-75 years   BMI 30 or less          Modified Frailty Index      Flowsheet Row Most Recent Value   Modified Frailty Index Calculator .1818          CHADS2 Stroke Risk  Current as of today        N/A 3 to 100%: High Risk   2 to < 3%: Medium Risk   0 to < 2%: Low Risk     Last Change: N/A          This score determines the patient's risk of having a stroke if the patient has atrial fibrillation.        This score is not applicable to this patient. Components are not calculated.          Revised Cardiac Risk Index    No data to display       Apfel Simplified Score    No data to display       Risk Analysis Index Results This Encounter         5/6/2024  1009             [facility] Cancer History: Patient does not indicate history of cancer    Total Risk Analysis Index Score Without Cancer: 19    Total Risk Analysis Index Score: 19          Stop Bang Score      Flowsheet Row Most Recent Value   Do you snore loudly? 0   Do you often feel tired or fatigued after your sleep? 0   Has anyone ever observed you stop breathing in your sleep? 0   Do you have or are you being treated for high blood pressure? 1   Recent BMI (Calculated) 22.3   Is BMI greater than 35 kg/m2? 0=No   Age older than 50 years old? 1=Yes   Is your neck circumference greater than 17 inches (Male) or 16 inches (Female)? 0   Gender - Male 0=No   STOP-BANG Total Score 2            Assessment and Plan:   64 yo female scheduled for right robotic pyeloplasty with Dr. Lawrence 5/20/2024. Labs ordered by surgeon.    See risk scores as previously documented.

## 2024-05-07 LAB — BACTERIA UR CULT: NORMAL

## 2024-05-17 ENCOUNTER — APPOINTMENT (OUTPATIENT)
Dept: ALLERGY | Facility: CLINIC | Age: 64
End: 2024-05-17
Payer: COMMERCIAL

## 2024-05-17 NOTE — DISCHARGE INSTRUCTIONS
Postoperative Care Instructions: Pyeloplasty    Medications:    Over the counter acetaminophen (Tylenol) and/or ibuprofen (Motrin) should be taken every 6 hours as needed for pain. Alternate these so that one is taken every 3 hours.  Oxycodone can be taken for pain not controlled by Tylenol or Motrin. This is a narcotic mediation that can cause drowsiness, slow reaction times, and constipation. Take a stool softener to avoid constipation.       Wound care and activity:  It is ok to bathe/shower 2 days after surgery. Sponge baths are okay as early as today.  There is a layer of skin glue on the incision(s), which takes about a week to flake off.  Stitches are under the skin and are completely dissolvable.  No sports, heavy lifting or rough-housing until at least 3 weeks after surgery (walking and/or climbing stairs is ok).  No pools, hot tubs, or baths for at least 4 weeks.     Things to expect after surgery:  Urine may be bloody for several days after surgery; it can also clear up and then become bloody again and last as long as the stent is in place. This is normal.  Drink plenty of fluids to keep urine dilute.  Needing to urinate frequently is normal and can last for a few days or weeks after surgery while the stent is in place.     When to call the surgeon:  Active bleeding or excessive drainage from incision or drain (if there is one)  Fever higher than 102?F  Repeated vomiting with inability to keep down fluids  Pain not controlled with medication  Inability to urinate     For questions or problems, call our office at [phone]. After 5 p.m. call the hospital  [phone] and ask for the [id number].  In case of emergency, call [phone].    Follow-up appointment:    Future Appointments   Date Time Provider Department [place]   6/4/2024  8:30 AM Jose Martin Lawrence MD [id number] [place]   9/26/2024 10:00 AM Marie Ocampo DO [id number] [place]

## 2024-05-19 RX ORDER — LIDOCAINE 560 MG/1
1 PATCH PERCUTANEOUS; TOPICAL; TRANSDERMAL DAILY
Status: CANCELLED | OUTPATIENT
Start: 2024-05-20

## 2024-05-20 ENCOUNTER — ANESTHESIA (OUTPATIENT)
Dept: OPERATING ROOM | Facility: HOSPITAL | Age: 64
End: 2024-05-20
Payer: COMMERCIAL

## 2024-05-20 ENCOUNTER — ANESTHESIA EVENT (OUTPATIENT)
Dept: OPERATING ROOM | Facility: HOSPITAL | Age: 64
End: 2024-05-20
Payer: COMMERCIAL

## 2024-05-20 ENCOUNTER — HOSPITAL ENCOUNTER (OUTPATIENT)
Facility: HOSPITAL | Age: 64
Discharge: HOME | End: 2024-05-21
Attending: UROLOGY | Admitting: UROLOGY
Payer: COMMERCIAL

## 2024-05-20 DIAGNOSIS — N13.5 UPJ OBSTRUCTION, ACQUIRED: ICD-10-CM

## 2024-05-20 DIAGNOSIS — N13.5 URETEROPELVIC JUNCTION OBSTRUCTION: Primary | ICD-10-CM

## 2024-05-20 PROCEDURE — 52332 CYSTOSCOPY AND TREATMENT: CPT | Performed by: UROLOGY

## 2024-05-20 PROCEDURE — 2500000005 HC RX 250 GENERAL PHARMACY W/O HCPCS: Performed by: UROLOGY

## 2024-05-20 PROCEDURE — 2500000005 HC RX 250 GENERAL PHARMACY W/O HCPCS: Performed by: ANESTHESIOLOGIST ASSISTANT

## 2024-05-20 PROCEDURE — 3600000009 HC OR TIME - EACH INCREMENTAL 1 MINUTE - PROCEDURE LEVEL FOUR: Mod: RT | Performed by: UROLOGY

## 2024-05-20 PROCEDURE — A50544 PR LAP,PYELOPLASTY: Performed by: ANESTHESIOLOGY

## 2024-05-20 PROCEDURE — G0378 HOSPITAL OBSERVATION PER HR: HCPCS

## 2024-05-20 PROCEDURE — 3700000002 HC GENERAL ANESTHESIA TIME - EACH INCREMENTAL 1 MINUTE: Performed by: UROLOGY

## 2024-05-20 PROCEDURE — 3700000001 HC GENERAL ANESTHESIA TIME - INITIAL BASE CHARGE: Performed by: UROLOGY

## 2024-05-20 PROCEDURE — 2500000001 HC RX 250 WO HCPCS SELF ADMINISTERED DRUGS (ALT 637 FOR MEDICARE OP): Performed by: STUDENT IN AN ORGANIZED HEALTH CARE EDUCATION/TRAINING PROGRAM

## 2024-05-20 PROCEDURE — 88305 TISSUE EXAM BY PATHOLOGIST: CPT | Mod: TC,STJLAB | Performed by: UROLOGY

## 2024-05-20 PROCEDURE — 50694 PLMT URETERAL STENT PRQ: CPT | Performed by: UROLOGY

## 2024-05-20 PROCEDURE — 2500000004 HC RX 250 GENERAL PHARMACY W/ HCPCS (ALT 636 FOR OP/ED): Performed by: UROLOGY

## 2024-05-20 PROCEDURE — 88305 TISSUE EXAM BY PATHOLOGIST: CPT | Performed by: PATHOLOGY

## 2024-05-20 PROCEDURE — 2500000004 HC RX 250 GENERAL PHARMACY W/ HCPCS (ALT 636 FOR OP/ED): Performed by: STUDENT IN AN ORGANIZED HEALTH CARE EDUCATION/TRAINING PROGRAM

## 2024-05-20 PROCEDURE — A50544 PR LAP,PYELOPLASTY: Performed by: ANESTHESIOLOGIST ASSISTANT

## 2024-05-20 PROCEDURE — 7100000011 HC EXTENDED STAY RECOVERY HOURLY - NURSING UNIT

## 2024-05-20 PROCEDURE — 2500000004 HC RX 250 GENERAL PHARMACY W/ HCPCS (ALT 636 FOR OP/ED): Performed by: ANESTHESIOLOGY

## 2024-05-20 PROCEDURE — 3600000004 HC OR TIME - INITIAL BASE CHARGE - PROCEDURE LEVEL FOUR: Mod: RT | Performed by: UROLOGY

## 2024-05-20 PROCEDURE — 96372 THER/PROPH/DIAG INJ SC/IM: CPT | Mod: 59 | Performed by: STUDENT IN AN ORGANIZED HEALTH CARE EDUCATION/TRAINING PROGRAM

## 2024-05-20 PROCEDURE — 2500000006 HC RX 250 W HCPCS SELF ADMINISTERED DRUGS (ALT 637 FOR ALL PAYERS): Performed by: STUDENT IN AN ORGANIZED HEALTH CARE EDUCATION/TRAINING PROGRAM

## 2024-05-20 PROCEDURE — C2617 STENT, NON-COR, TEM W/O DEL: HCPCS | Performed by: UROLOGY

## 2024-05-20 PROCEDURE — 2780000003 HC OR 278 NO HCPCS: Performed by: UROLOGY

## 2024-05-20 PROCEDURE — 2720000007 HC OR 272 NO HCPCS: Performed by: UROLOGY

## 2024-05-20 PROCEDURE — 7100000002 HC RECOVERY ROOM TIME - EACH INCREMENTAL 1 MINUTE: Performed by: UROLOGY

## 2024-05-20 PROCEDURE — 7100000001 HC RECOVERY ROOM TIME - INITIAL BASE CHARGE: Performed by: UROLOGY

## 2024-05-20 PROCEDURE — 2500000004 HC RX 250 GENERAL PHARMACY W/ HCPCS (ALT 636 FOR OP/ED): Performed by: ANESTHESIOLOGIST ASSISTANT

## 2024-05-20 PROCEDURE — 50544 LAPAROSCOPY PYELOPLASTY: CPT | Performed by: UROLOGY

## 2024-05-20 DEVICE — URETERAL STENT
Type: IMPLANTABLE DEVICE | Site: URETER | Status: FUNCTIONAL
Brand: PERCUFLEX™ PLUS

## 2024-05-20 RX ORDER — NALOXONE HYDROCHLORIDE 0.4 MG/ML
0.2 INJECTION, SOLUTION INTRAMUSCULAR; INTRAVENOUS; SUBCUTANEOUS EVERY 5 MIN PRN
Status: DISCONTINUED | OUTPATIENT
Start: 2024-05-20 | End: 2024-05-21 | Stop reason: HOSPADM

## 2024-05-20 RX ORDER — FORMOTEROL FUMARATE DIHYDRATE 20 UG/2ML
20 SOLUTION RESPIRATORY (INHALATION) 2 TIMES DAILY PRN
Status: DISCONTINUED | OUTPATIENT
Start: 2024-05-20 | End: 2024-05-21 | Stop reason: HOSPADM

## 2024-05-20 RX ORDER — FLUTICASONE FUROATE AND VILANTEROL 200; 25 UG/1; UG/1
1 POWDER RESPIRATORY (INHALATION)
Status: DISCONTINUED | OUTPATIENT
Start: 2024-05-20 | End: 2024-05-20 | Stop reason: ALTCHOICE

## 2024-05-20 RX ORDER — HYDRALAZINE HYDROCHLORIDE 20 MG/ML
5 INJECTION INTRAMUSCULAR; INTRAVENOUS EVERY 30 MIN PRN
Status: DISCONTINUED | OUTPATIENT
Start: 2024-05-20 | End: 2024-05-20 | Stop reason: HOSPADM

## 2024-05-20 RX ORDER — BUPIVACAINE HYDROCHLORIDE 5 MG/ML
INJECTION, SOLUTION PERINEURAL AS NEEDED
Status: DISCONTINUED | OUTPATIENT
Start: 2024-05-20 | End: 2024-05-20 | Stop reason: HOSPADM

## 2024-05-20 RX ORDER — OXYCODONE HYDROCHLORIDE 10 MG/1
10 TABLET ORAL EVERY 6 HOURS PRN
Status: DISCONTINUED | OUTPATIENT
Start: 2024-05-20 | End: 2024-05-21 | Stop reason: HOSPADM

## 2024-05-20 RX ORDER — BUDESONIDE 0.5 MG/2ML
0.5 INHALANT ORAL 2 TIMES DAILY PRN
Status: DISCONTINUED | OUTPATIENT
Start: 2024-05-20 | End: 2024-05-21 | Stop reason: HOSPADM

## 2024-05-20 RX ORDER — ONDANSETRON HYDROCHLORIDE 2 MG/ML
4 INJECTION, SOLUTION INTRAVENOUS EVERY 8 HOURS PRN
Status: DISCONTINUED | OUTPATIENT
Start: 2024-05-20 | End: 2024-05-21 | Stop reason: HOSPADM

## 2024-05-20 RX ORDER — HYDROMORPHONE HYDROCHLORIDE 1 MG/ML
INJECTION, SOLUTION INTRAMUSCULAR; INTRAVENOUS; SUBCUTANEOUS AS NEEDED
Status: DISCONTINUED | OUTPATIENT
Start: 2024-05-20 | End: 2024-05-20

## 2024-05-20 RX ORDER — GLYCOPYRROLATE 0.2 MG/ML
INJECTION INTRAMUSCULAR; INTRAVENOUS AS NEEDED
Status: DISCONTINUED | OUTPATIENT
Start: 2024-05-20 | End: 2024-05-20

## 2024-05-20 RX ORDER — ONDANSETRON HYDROCHLORIDE 2 MG/ML
INJECTION, SOLUTION INTRAVENOUS AS NEEDED
Status: DISCONTINUED | OUTPATIENT
Start: 2024-05-20 | End: 2024-05-20

## 2024-05-20 RX ORDER — BISACODYL 5 MG
10 TABLET, DELAYED RELEASE (ENTERIC COATED) ORAL DAILY PRN
Status: DISCONTINUED | OUTPATIENT
Start: 2024-05-20 | End: 2024-05-21 | Stop reason: HOSPADM

## 2024-05-20 RX ORDER — SODIUM CHLORIDE, SODIUM LACTATE, POTASSIUM CHLORIDE, CALCIUM CHLORIDE 600; 310; 30; 20 MG/100ML; MG/100ML; MG/100ML; MG/100ML
INJECTION, SOLUTION INTRAVENOUS CONTINUOUS PRN
Status: DISCONTINUED | OUTPATIENT
Start: 2024-05-20 | End: 2024-05-20

## 2024-05-20 RX ORDER — ACETAMINOPHEN 325 MG/1
975 TABLET ORAL EVERY 6 HOURS
Status: DISCONTINUED | OUTPATIENT
Start: 2024-05-20 | End: 2024-05-21 | Stop reason: HOSPADM

## 2024-05-20 RX ORDER — HYDROMORPHONE HYDROCHLORIDE 1 MG/ML
1 INJECTION, SOLUTION INTRAMUSCULAR; INTRAVENOUS; SUBCUTANEOUS EVERY 5 MIN PRN
Status: DISCONTINUED | OUTPATIENT
Start: 2024-05-20 | End: 2024-05-20 | Stop reason: HOSPADM

## 2024-05-20 RX ORDER — ROCURONIUM BROMIDE 50 MG/5 ML
SYRINGE (ML) INTRAVENOUS AS NEEDED
Status: DISCONTINUED | OUTPATIENT
Start: 2024-05-20 | End: 2024-05-20

## 2024-05-20 RX ORDER — METOCLOPRAMIDE HYDROCHLORIDE 5 MG/ML
10 INJECTION INTRAMUSCULAR; INTRAVENOUS ONCE AS NEEDED
Status: COMPLETED | OUTPATIENT
Start: 2024-05-20 | End: 2024-05-20

## 2024-05-20 RX ORDER — OXYCODONE HYDROCHLORIDE 5 MG/1
5 TABLET ORAL EVERY 6 HOURS PRN
Status: DISCONTINUED | OUTPATIENT
Start: 2024-05-20 | End: 2024-05-21 | Stop reason: HOSPADM

## 2024-05-20 RX ORDER — TALC
3 POWDER (GRAM) TOPICAL NIGHTLY PRN
Status: DISCONTINUED | OUTPATIENT
Start: 2024-05-20 | End: 2024-05-21 | Stop reason: HOSPADM

## 2024-05-20 RX ORDER — HEPARIN SODIUM 5000 [USP'U]/ML
5000 INJECTION, SOLUTION INTRAVENOUS; SUBCUTANEOUS EVERY 8 HOURS
Status: DISCONTINUED | OUTPATIENT
Start: 2024-05-20 | End: 2024-05-21 | Stop reason: HOSPADM

## 2024-05-20 RX ORDER — LABETALOL HYDROCHLORIDE 5 MG/ML
5 INJECTION, SOLUTION INTRAVENOUS
Status: DISCONTINUED | OUTPATIENT
Start: 2024-05-20 | End: 2024-05-20 | Stop reason: HOSPADM

## 2024-05-20 RX ORDER — FENTANYL CITRATE 50 UG/ML
INJECTION, SOLUTION INTRAMUSCULAR; INTRAVENOUS AS NEEDED
Status: DISCONTINUED | OUTPATIENT
Start: 2024-05-20 | End: 2024-05-20

## 2024-05-20 RX ORDER — TAMSULOSIN HYDROCHLORIDE 0.4 MG/1
0.4 CAPSULE ORAL DAILY
Status: DISCONTINUED | OUTPATIENT
Start: 2024-05-20 | End: 2024-05-21 | Stop reason: HOSPADM

## 2024-05-20 RX ORDER — FORMOTEROL FUMARATE DIHYDRATE 20 UG/2ML
20 SOLUTION RESPIRATORY (INHALATION)
Status: DISCONTINUED | OUTPATIENT
Start: 2024-05-20 | End: 2024-05-20

## 2024-05-20 RX ORDER — SIMETHICONE 80 MG
160 TABLET,CHEWABLE ORAL
Status: DISCONTINUED | OUTPATIENT
Start: 2024-05-20 | End: 2024-05-21 | Stop reason: HOSPADM

## 2024-05-20 RX ORDER — IPRATROPIUM BROMIDE 0.5 MG/2.5ML
0.5 SOLUTION RESPIRATORY (INHALATION)
Status: DISCONTINUED | OUTPATIENT
Start: 2024-05-20 | End: 2024-05-20

## 2024-05-20 RX ORDER — DIPHENHYDRAMINE HYDROCHLORIDE 50 MG/ML
12.5 INJECTION INTRAMUSCULAR; INTRAVENOUS ONCE AS NEEDED
Status: DISCONTINUED | OUTPATIENT
Start: 2024-05-20 | End: 2024-05-20 | Stop reason: HOSPADM

## 2024-05-20 RX ORDER — FLUTICASONE PROPIONATE 50 MCG
2 SPRAY, SUSPENSION (ML) NASAL DAILY
Status: DISCONTINUED | OUTPATIENT
Start: 2024-05-20 | End: 2024-05-21 | Stop reason: HOSPADM

## 2024-05-20 RX ORDER — ONDANSETRON 4 MG/1
4 TABLET, ORALLY DISINTEGRATING ORAL EVERY 8 HOURS PRN
Status: DISCONTINUED | OUTPATIENT
Start: 2024-05-20 | End: 2024-05-21 | Stop reason: HOSPADM

## 2024-05-20 RX ORDER — MIDAZOLAM HYDROCHLORIDE 1 MG/ML
1 INJECTION, SOLUTION INTRAMUSCULAR; INTRAVENOUS ONCE AS NEEDED
Status: DISCONTINUED | OUTPATIENT
Start: 2024-05-20 | End: 2024-05-20 | Stop reason: HOSPADM

## 2024-05-20 RX ORDER — LIDOCAINE 560 MG/1
1 PATCH PERCUTANEOUS; TOPICAL; TRANSDERMAL DAILY
Status: DISCONTINUED | OUTPATIENT
Start: 2024-05-20 | End: 2024-05-21 | Stop reason: HOSPADM

## 2024-05-20 RX ORDER — SODIUM CHLORIDE, SODIUM LACTATE, POTASSIUM CHLORIDE, CALCIUM CHLORIDE 600; 310; 30; 20 MG/100ML; MG/100ML; MG/100ML; MG/100ML
50 INJECTION, SOLUTION INTRAVENOUS CONTINUOUS
Status: DISCONTINUED | OUTPATIENT
Start: 2024-05-20 | End: 2024-05-21

## 2024-05-20 RX ORDER — SODIUM CHLORIDE 9 MG/ML
100 INJECTION, SOLUTION INTRAVENOUS CONTINUOUS
Status: DISCONTINUED | OUTPATIENT
Start: 2024-05-20 | End: 2024-05-21

## 2024-05-20 RX ORDER — CIPROFLOXACIN 2 MG/ML
400 INJECTION, SOLUTION INTRAVENOUS ONCE
Status: COMPLETED | OUTPATIENT
Start: 2024-05-20 | End: 2024-05-20

## 2024-05-20 RX ORDER — LIDOCAINE HYDROCHLORIDE 20 MG/ML
INJECTION, SOLUTION EPIDURAL; INFILTRATION; INTRACAUDAL; PERINEURAL AS NEEDED
Status: DISCONTINUED | OUTPATIENT
Start: 2024-05-20 | End: 2024-05-20

## 2024-05-20 RX ORDER — HYDROMORPHONE HYDROCHLORIDE 0.2 MG/ML
0.1 INJECTION INTRAMUSCULAR; INTRAVENOUS; SUBCUTANEOUS EVERY 4 HOURS PRN
Status: DISCONTINUED | OUTPATIENT
Start: 2024-05-20 | End: 2024-05-21 | Stop reason: HOSPADM

## 2024-05-20 RX ORDER — BUDESONIDE 0.5 MG/2ML
0.5 INHALANT ORAL
Status: DISCONTINUED | OUTPATIENT
Start: 2024-05-20 | End: 2024-05-20

## 2024-05-20 RX ORDER — SENNOSIDES 8.6 MG/1
2 TABLET ORAL 2 TIMES DAILY
Status: DISCONTINUED | OUTPATIENT
Start: 2024-05-20 | End: 2024-05-21 | Stop reason: HOSPADM

## 2024-05-20 RX ORDER — IPRATROPIUM BROMIDE 0.5 MG/2.5ML
0.5 SOLUTION RESPIRATORY (INHALATION) 4 TIMES DAILY PRN
Status: DISCONTINUED | OUTPATIENT
Start: 2024-05-20 | End: 2024-05-21 | Stop reason: HOSPADM

## 2024-05-20 RX ORDER — PHENAZOPYRIDINE HYDROCHLORIDE 100 MG/1
200 TABLET, FILM COATED ORAL 3 TIMES DAILY PRN
Status: DISCONTINUED | OUTPATIENT
Start: 2024-05-20 | End: 2024-05-21 | Stop reason: HOSPADM

## 2024-05-20 RX ORDER — FAMOTIDINE 20 MG/1
40 TABLET, FILM COATED ORAL DAILY PRN
Status: DISCONTINUED | OUTPATIENT
Start: 2024-05-20 | End: 2024-05-21 | Stop reason: HOSPADM

## 2024-05-20 RX ORDER — SODIUM CHLORIDE, SODIUM LACTATE, POTASSIUM CHLORIDE, CALCIUM CHLORIDE 600; 310; 30; 20 MG/100ML; MG/100ML; MG/100ML; MG/100ML
100 INJECTION, SOLUTION INTRAVENOUS CONTINUOUS
Status: DISCONTINUED | OUTPATIENT
Start: 2024-05-20 | End: 2024-05-20 | Stop reason: HOSPADM

## 2024-05-20 RX ORDER — HYDROCODONE BITARTRATE AND ACETAMINOPHEN 5; 325 MG/1; MG/1
1 TABLET ORAL EVERY 4 HOURS PRN
Status: DISCONTINUED | OUTPATIENT
Start: 2024-05-20 | End: 2024-05-20 | Stop reason: HOSPADM

## 2024-05-20 RX ORDER — DEXAMETHASONE SODIUM PHOSPHATE 10 MG/ML
INJECTION INTRAMUSCULAR; INTRAVENOUS AS NEEDED
Status: DISCONTINUED | OUTPATIENT
Start: 2024-05-20 | End: 2024-05-20

## 2024-05-20 RX ORDER — OXYBUTYNIN CHLORIDE 5 MG/1
5 TABLET ORAL 3 TIMES DAILY PRN
Status: DISCONTINUED | OUTPATIENT
Start: 2024-05-20 | End: 2024-05-21 | Stop reason: HOSPADM

## 2024-05-20 RX ORDER — POLYETHYLENE GLYCOL 3350 17 G/17G
17 POWDER, FOR SOLUTION ORAL DAILY
Status: DISCONTINUED | OUTPATIENT
Start: 2024-05-20 | End: 2024-05-21 | Stop reason: HOSPADM

## 2024-05-20 RX ORDER — METHOCARBAMOL 500 MG/1
500 TABLET, FILM COATED ORAL EVERY 6 HOURS SCHEDULED
Status: DISCONTINUED | OUTPATIENT
Start: 2024-05-20 | End: 2024-05-21 | Stop reason: HOSPADM

## 2024-05-20 RX ORDER — MIDAZOLAM HYDROCHLORIDE 1 MG/ML
INJECTION, SOLUTION INTRAMUSCULAR; INTRAVENOUS AS NEEDED
Status: DISCONTINUED | OUTPATIENT
Start: 2024-05-20 | End: 2024-05-20

## 2024-05-20 RX ORDER — ALBUTEROL SULFATE 0.83 MG/ML
2.5 SOLUTION RESPIRATORY (INHALATION)
Status: DISCONTINUED | OUTPATIENT
Start: 2024-05-20 | End: 2024-05-20 | Stop reason: HOSPADM

## 2024-05-20 RX ORDER — PROPOFOL 10 MG/ML
INJECTION, EMULSION INTRAVENOUS AS NEEDED
Status: DISCONTINUED | OUTPATIENT
Start: 2024-05-20 | End: 2024-05-20

## 2024-05-20 RX ADMIN — METOCLOPRAMIDE 10 MG: 5 INJECTION, SOLUTION INTRAMUSCULAR; INTRAVENOUS at 11:18

## 2024-05-20 RX ADMIN — PROPOFOL 50 MG: 10 INJECTION, EMULSION INTRAVENOUS at 09:13

## 2024-05-20 RX ADMIN — FENTANYL CITRATE 50 MCG: 50 INJECTION, SOLUTION INTRAMUSCULAR; INTRAVENOUS at 09:13

## 2024-05-20 RX ADMIN — ONDANSETRON 4 MG: 2 INJECTION, SOLUTION INTRAMUSCULAR; INTRAVENOUS at 10:30

## 2024-05-20 RX ADMIN — GLYCOPYRROLATE 0.2 MG: 0.2 INJECTION, SOLUTION INTRAMUSCULAR; INTRAVENOUS at 09:55

## 2024-05-20 RX ADMIN — MIDAZOLAM 2 MG: 1 INJECTION INTRAMUSCULAR; INTRAVENOUS at 08:47

## 2024-05-20 RX ADMIN — ACETAMINOPHEN 975 MG: 325 TABLET ORAL at 18:33

## 2024-05-20 RX ADMIN — PROPOFOL 150 MG: 10 INJECTION, EMULSION INTRAVENOUS at 08:47

## 2024-05-20 RX ADMIN — OXYCODONE HYDROCHLORIDE 5 MG: 5 TABLET ORAL at 13:57

## 2024-05-20 RX ADMIN — OXYCODONE HYDROCHLORIDE 10 MG: 10 TABLET ORAL at 20:00

## 2024-05-20 RX ADMIN — CIPROFLOXACIN 400 MG: 400 INJECTION, SOLUTION INTRAVENOUS at 08:55

## 2024-05-20 RX ADMIN — HYDROMORPHONE HYDROCHLORIDE 0.5 MG: 1 INJECTION, SOLUTION INTRAMUSCULAR; INTRAVENOUS; SUBCUTANEOUS at 11:00

## 2024-05-20 RX ADMIN — HYDROMORPHONE HYDROCHLORIDE 1 MG: 1 INJECTION, SOLUTION INTRAMUSCULAR; INTRAVENOUS; SUBCUTANEOUS at 11:18

## 2024-05-20 RX ADMIN — DEXAMETHASONE SODIUM PHOSPHATE 10 MG: 10 INJECTION INTRAMUSCULAR; INTRAVENOUS at 09:13

## 2024-05-20 RX ADMIN — METHOCARBAMOL 500 MG: 500 TABLET ORAL at 20:08

## 2024-05-20 RX ADMIN — SUGAMMADEX 120 MG: 100 INJECTION, SOLUTION INTRAVENOUS at 10:48

## 2024-05-20 RX ADMIN — Medication 50 MG: at 08:47

## 2024-05-20 RX ADMIN — LIDOCAINE HYDROCHLORIDE 60 MG: 20 INJECTION, SOLUTION EPIDURAL; INFILTRATION; INTRACAUDAL; PERINEURAL at 08:47

## 2024-05-20 RX ADMIN — METHOCARBAMOL 500 MG: 500 TABLET ORAL at 13:57

## 2024-05-20 RX ADMIN — TAMSULOSIN HYDROCHLORIDE 0.4 MG: 0.4 CAPSULE ORAL at 21:12

## 2024-05-20 RX ADMIN — HEPARIN SODIUM 5000 UNITS: 5000 INJECTION INTRAVENOUS; SUBCUTANEOUS at 21:05

## 2024-05-20 RX ADMIN — SODIUM CHLORIDE, POTASSIUM CHLORIDE, SODIUM LACTATE AND CALCIUM CHLORIDE: 600; 310; 30; 20 INJECTION, SOLUTION INTRAVENOUS at 08:12

## 2024-05-20 RX ADMIN — HYDROMORPHONE HYDROCHLORIDE 0.5 MG: 1 INJECTION, SOLUTION INTRAMUSCULAR; INTRAVENOUS; SUBCUTANEOUS at 12:10

## 2024-05-20 RX ADMIN — SIMETHICONE 160 MG: 80 TABLET, CHEWABLE ORAL at 18:33

## 2024-05-20 RX ADMIN — SODIUM CHLORIDE, POTASSIUM CHLORIDE, SODIUM LACTATE AND CALCIUM CHLORIDE 50 ML/HR: 600; 310; 30; 20 INJECTION, SOLUTION INTRAVENOUS at 13:57

## 2024-05-20 RX ADMIN — SENNOSIDES 17.2 MG: 8.6 TABLET, FILM COATED ORAL at 21:05

## 2024-05-20 RX ADMIN — FENTANYL CITRATE 50 MCG: 50 INJECTION, SOLUTION INTRAMUSCULAR; INTRAVENOUS at 08:47

## 2024-05-20 RX ADMIN — HYDROMORPHONE HYDROCHLORIDE 0.5 MG: 1 INJECTION, SOLUTION INTRAMUSCULAR; INTRAVENOUS; SUBCUTANEOUS at 10:30

## 2024-05-20 RX ADMIN — Medication 10 MG: at 10:05

## 2024-05-20 SDOH — SOCIAL STABILITY: SOCIAL INSECURITY: ARE YOU OR HAVE YOU BEEN THREATENED OR ABUSED PHYSICALLY, EMOTIONALLY, OR SEXUALLY BY ANYONE?: NO

## 2024-05-20 SDOH — SOCIAL STABILITY: SOCIAL INSECURITY: WERE YOU ABLE TO COMPLETE ALL THE BEHAVIORAL HEALTH SCREENINGS?: YES

## 2024-05-20 SDOH — SOCIAL STABILITY: SOCIAL INSECURITY: DO YOU FEEL UNSAFE GOING BACK TO THE PLACE WHERE YOU ARE LIVING?: NO

## 2024-05-20 SDOH — SOCIAL STABILITY: SOCIAL INSECURITY: ABUSE: ADULT

## 2024-05-20 SDOH — HEALTH STABILITY: MENTAL HEALTH: CURRENT SMOKER: 0

## 2024-05-20 SDOH — SOCIAL STABILITY: SOCIAL INSECURITY: ARE THERE ANY APPARENT SIGNS OF INJURIES/BEHAVIORS THAT COULD BE RELATED TO ABUSE/NEGLECT?: NO

## 2024-05-20 SDOH — SOCIAL STABILITY: SOCIAL INSECURITY: DOES ANYONE TRY TO KEEP YOU FROM HAVING/CONTACTING OTHER FRIENDS OR DOING THINGS OUTSIDE YOUR HOME?: NO

## 2024-05-20 SDOH — SOCIAL STABILITY: SOCIAL INSECURITY: DO YOU FEEL ANYONE HAS EXPLOITED OR TAKEN ADVANTAGE OF YOU FINANCIALLY OR OF YOUR PERSONAL PROPERTY?: NO

## 2024-05-20 SDOH — SOCIAL STABILITY: SOCIAL INSECURITY: HAVE YOU HAD THOUGHTS OF HARMING ANYONE ELSE?: NO

## 2024-05-20 SDOH — SOCIAL STABILITY: SOCIAL INSECURITY: HAVE YOU HAD ANY THOUGHTS OF HARMING ANYONE ELSE?: NO

## 2024-05-20 SDOH — SOCIAL STABILITY: SOCIAL INSECURITY: HAS ANYONE EVER THREATENED TO HURT YOUR FAMILY OR YOUR PETS?: NO

## 2024-05-20 ASSESSMENT — PAIN SCALES - GENERAL
PAINLEVEL_OUTOF10: 3
PAINLEVEL_OUTOF10: 6
PAINLEVEL_OUTOF10: 7
PAINLEVEL_OUTOF10: 6
PAINLEVEL_OUTOF10: 7
PAINLEVEL_OUTOF10: 8

## 2024-05-20 ASSESSMENT — COGNITIVE AND FUNCTIONAL STATUS - GENERAL
DAILY ACTIVITIY SCORE: 24
MOBILITY SCORE: 24
DAILY ACTIVITIY SCORE: 24
MOBILITY SCORE: 24
PATIENT BASELINE BEDBOUND: NO

## 2024-05-20 ASSESSMENT — ACTIVITIES OF DAILY LIVING (ADL)
LACK_OF_TRANSPORTATION: PATIENT DECLINED
TOILETING: INDEPENDENT
GROOMING: INDEPENDENT
JUDGMENT_ADEQUATE_SAFELY_COMPLETE_DAILY_ACTIVITIES: YES
DRESSING YOURSELF: INDEPENDENT
HEARING - RIGHT EAR: FUNCTIONAL
BATHING: INDEPENDENT
WALKS IN HOME: INDEPENDENT
HEARING - LEFT EAR: FUNCTIONAL
FEEDING YOURSELF: INDEPENDENT
ADEQUATE_TO_COMPLETE_ADL: YES
PATIENT'S MEMORY ADEQUATE TO SAFELY COMPLETE DAILY ACTIVITIES?: YES

## 2024-05-20 ASSESSMENT — PAIN DESCRIPTION - DESCRIPTORS
DESCRIPTORS: ACHING
DESCRIPTORS: ACHING;DISCOMFORT;SHARP;PRESSURE
DESCRIPTORS: ACHING;SHARP;SHOOTING

## 2024-05-20 ASSESSMENT — LIFESTYLE VARIABLES
HOW MANY STANDARD DRINKS CONTAINING ALCOHOL DO YOU HAVE ON A TYPICAL DAY: PATIENT DOES NOT DRINK
AUDIT-C TOTAL SCORE: 0
HOW OFTEN DO YOU HAVE A DRINK CONTAINING ALCOHOL: NEVER
SKIP TO QUESTIONS 9-10: 1
AUDIT-C TOTAL SCORE: 0
HOW OFTEN DO YOU HAVE 6 OR MORE DRINKS ON ONE OCCASION: NEVER

## 2024-05-20 ASSESSMENT — PAIN - FUNCTIONAL ASSESSMENT
PAIN_FUNCTIONAL_ASSESSMENT: 0-10

## 2024-05-20 ASSESSMENT — COLUMBIA-SUICIDE SEVERITY RATING SCALE - C-SSRS
2. HAVE YOU ACTUALLY HAD ANY THOUGHTS OF KILLING YOURSELF?: NO
6. HAVE YOU EVER DONE ANYTHING, STARTED TO DO ANYTHING, OR PREPARED TO DO ANYTHING TO END YOUR LIFE?: NO
1. IN THE PAST MONTH, HAVE YOU WISHED YOU WERE DEAD OR WISHED YOU COULD GO TO SLEEP AND NOT WAKE UP?: NO

## 2024-05-20 ASSESSMENT — PATIENT HEALTH QUESTIONNAIRE - PHQ9
1. LITTLE INTEREST OR PLEASURE IN DOING THINGS: NOT AT ALL
SUM OF ALL RESPONSES TO PHQ9 QUESTIONS 1 & 2: 0
2. FEELING DOWN, DEPRESSED OR HOPELESS: NOT AT ALL

## 2024-05-20 NOTE — ANESTHESIA PROCEDURE NOTES
Peripheral IV  Date/Time: 5/20/2024 9:00 AM  Inserted by: CARLOS Salazar    Placement  Needle size: 18 G  Laterality: right  Location: hand  Local anesthetic: none  Site prep: alcohol  Technique: anatomical landmarks  Attempts: 1

## 2024-05-20 NOTE — ANESTHESIA POSTPROCEDURE EVALUATION
Patient: Jesica Brown    Procedure Summary       Date: 05/20/24 Room / Location: [facility] OR 08 / Virtual [facility]    Anesthesia Start: 0840 Anesthesia Stop: 1102    Procedure: Pyeloplasty Robot-Assisted (Right: Ureter) Diagnosis:       UPJ obstruction, acquired      (UPJ obstruction, acquired [N13.5])    Surgeons: Jose Martin Lawrence MD Responsible Provider: Miley Gibson MD    Anesthesia Type: general ASA Status: 2            Anesthesia Type: general    Vitals Value Taken Time   /67 05/20/24 1100   Temp 36.0 05/20/24 1102   Pulse 74 05/20/24 1100   Resp 15 05/20/24 1100   SpO2 96 % 05/20/24 1100   Vitals shown include unfiled device data.    Anesthesia Post Evaluation    Patient location during evaluation: PACU  Patient participation: complete - patient participated  Level of consciousness: awake and alert  Pain management: satisfactory to patient  Airway patency: patent  Cardiovascular status: acceptable  Respiratory status: acceptable, spontaneous ventilation and nonlabored ventilation  Hydration status: acceptable  Postoperative Nausea and Vomiting: none        No notable events documented.

## 2024-05-20 NOTE — ANESTHESIA PREPROCEDURE EVALUATION
"Patient: Jesica Brown    Procedure Information       Date/Time: 24 0820    Procedure: Pyeloplasty Robot-Assisted (Right: Ureter)    Location: [facility] 08 / [facility]    Surgeons: Jose Martin Lawrence MD            Relevant Problems   Cardiac   (+) Hypertension      Pulmonary   (+) Asthma with COPD (chronic obstructive pulmonary disease) (Multi)   (+) Lung nodule, multiple   (+) Moderate persistent asthma without complication (HHS-HCC)      GI   (+) GERD (gastroesophageal reflux disease)      /Renal   (+) Hydronephrosis      Musculoskeletal   (+) Primary osteoarthritis of both knees       Clinical information reviewed:   Tobacco  Allergies  Meds   Med Hx  Surg Hx  OB Status  Fam Hx  Soc   Hx        NPO Detail:  NPO/Void Status  Date of Last Liquid: 24  Date of Last Solid: 24         Physical Exam    Airway  Mallampati: I  TM distance: >3 FB  Neck ROM: full     Cardiovascular - normal exam     Dental - normal exam     Pulmonary - normal exam     Abdominal - normal exam           Vitals:    24 0721   BP: 109/77   Pulse: 65   Resp: 18   Temp: 36.2 °C (97.2 °F)   SpO2: 97%       Past Surgical History:   Procedure Laterality Date    COLONOSCOPY      CYSTOSCOPY      OTHER SURGICAL HISTORY  2022    [place] tooth extraction    OTHER SURGICAL HISTORY  2022    Hysterectomy    OTHER SURGICAL HISTORY  2022    Breast augmentation    OTHER SURGICAL HISTORY  2022    Tonsillectomy with adenoidectomy    OTHER SURGICAL HISTORY  2022     section    OTHER SURGICAL HISTORY  2022    Breast biopsy    OTHER SURGICAL HISTORY      Right wrist surgery-\"wire\"    UPPER GASTROINTESTINAL ENDOSCOPY       Past Medical History:   Diagnosis Date    Arthritis     Asthma (HHS-HCC)     COPD (chronic obstructive pulmonary disease) (Multi)     Well-controlled    Fibroadenoma of right breast     GERD (gastroesophageal reflux disease)     Hydronephrosis     Hypertension     " Obstruction of right ureter     Osteoporosis     Personal history of other diseases of the circulatory system 03/18/2022    History of hypertension    Personal history of other diseases of the respiratory system 03/18/2022    History of chronic obstructive lung disease       Current Facility-Administered Medications:     ciprofloxacin (Cipro) IVPB 400 mg, 400 mg, intravenous, Once, Jose Martin Lawrence MD    sodium chloride 0.9% infusion, 100 mL/hr, intravenous, Continuous, Jose Martin Lawrence MD  Prior to Admission medications    Medication Sig Start Date End Date Taking? Authorizing Provider   alendronate (Fosamax) 70 mg tablet Take 1 tablet (70 mg) by mouth every 7 days. Take in the morning with a full glass of water, on an empty stomach, and do not take anything else by mouth or lie down for the next 30 min. 9/25/23 9/24/24 Yes Marie Ocampo, DO   budesonide-glycopyr-formoterol (Breztri Aerosphere) 160-9-4.8 mcg/actuation HFA aerosol inhaler Inhale 2 puffs 2 times a day. 9/25/23 9/24/24 Yes Marie Ocampo DO   calcium carbonate-vitamin D3 500 mg-5 mcg (200 unit) tablet Take 1 tablet by mouth 2 times a day.   Yes Historical Provider, MD   cycloSPORINE 0.05 % drops Administer 1 drop into both eyes in the morning and at bedtime.   Yes Historical Provider, MD   famotidine (Pepcid) 40 mg tablet Take 1 tablet (40 mg) by mouth once daily as needed for heartburn.   Yes Historical Provider, MD   fluticasone (Flonase) 50 mcg/actuation nasal spray Administer 2 sprays into each nostril once daily. Shake gently. Before first use, prime pump. After use, clean tip and replace cap.   Yes Historical Provider, MD   Lactobacillus acidophilus (PROBIOTIC ORAL) Take 1 capsule by mouth once daily.   Yes Historical Provider, MD   multivit-min/ferrous fumarate (MULTI VITAMIN ORAL) Take 1 tablet by mouth once daily. Take 1 tablet daily   Yes Historical Provider, MD   tamsulosin (Flomax) 0.4 mg 24 hr capsule Take 1 capsule (0.4  "mg) by mouth once daily. 9/25/23 9/24/24 Yes Marie Ocampo DO   telmisartan (MIcarDIS) 40 mg tablet Take 1 tablet (40 mg) by mouth once daily.  Patient taking differently: Take 1 tablet (40 mg) by mouth once daily at bedtime. 9/25/23 9/24/24 Yes Marie Ocampo DO   omeprazole (PriLOSEC) 40 mg DR capsule Take 1 capsule (40 mg) by mouth once daily in the morning. Take before meals. Do not crush or chew.  Patient not taking: Reported on 5/6/2024 9/25/23 9/24/24  Marie Ocampo DO   phenazopyridine (Pyridium) 200 mg tablet Take 1 tablet (200 mg) by mouth 3 times a day as needed for bladder spasms.  Patient not taking: Reported on 5/6/2024 4/1/24   Gagan Hedrick MD   traZODone (Desyrel) 50 mg tablet Take 1 tablet (50 mg) by mouth once daily at bedtime.  Patient not taking: Reported on 5/6/2024 9/25/23 9/24/24  Marie Ocampo DO     Allergies   Allergen Reactions    Lisinopril Cough, GI Upset and Constipation    Lactose GI Upset    Penicillins Hives and Rash     Social History     Tobacco Use    Smoking status: Never    Smokeless tobacco: Never   Substance Use Topics    Alcohol use: Yes     Alcohol/week: 7.0 standard drinks of alcohol     Types: 7 Standard drinks or equivalent per week     Comment: DAILY         Chemistry    Lab Results   Component Value Date/Time     05/06/2024 1043    K 4.4 05/06/2024 1043     05/06/2024 1043    CO2 27 05/06/2024 1043    BUN 14 05/06/2024 1043    CREATININE 0.67 05/06/2024 1043    Lab Results   Component Value Date/Time    CALCIUM 9.1 05/06/2024 1043    ALKPHOS 42 11/13/2023 0950    AST 18 11/13/2023 0950    ALT 11 11/13/2023 0950    BILITOT 0.4 11/13/2023 0950          Lab Results   Component Value Date/Time    WBC 6.5 05/06/2024 1043    HGB 12.6 05/06/2024 1043    HCT 38.5 05/06/2024 1043     05/06/2024 1043     No results found for: \"PROTIME\", \"PTT\", \"INR\"  Encounter Date: 03/19/24   ECG 12 lead   Result Value    Ventricular Rate 58 "    Atrial Rate 58    MO Interval 178    QRS Duration 82    QT Interval 404    QTC Calculation(Bazett) 396    P Axis 64    R Axis 40    T Axis 58    QRS Count 9    Q Onset 221    P Onset 132    P Offset 188    T Offset 423    QTC Fredericia 399    Narrative    Sinus bradycardia  Low voltage QRS in frontal leads  Otherwise normal ECG  No previous ECGs available  Confirmed by Brad Aleman ([id number]) on 3/20/2024 8:58:44 AM      Anesthesia Plan    History of general anesthesia?: yes  History of complications of general anesthesia?: no    ASA 2     general     The patient is not a current smoker.    intravenous induction   Anesthetic plan and risks discussed with patient.    Plan discussed with CAA.

## 2024-05-20 NOTE — ANESTHESIA PROCEDURE NOTES
Airway  Date/Time: 5/20/2024 8:49 AM  Urgency: elective    Airway not difficult    Staffing  Performed: CARLOS   Authorized by: Mliey Gibson MD    Performed by: CARLOS Salazar  Patient location during procedure: OR    Indications and Patient Condition  Indications for airway management: anesthesia  Spontaneous Ventilation: absent  Sedation level: deep  Preoxygenated: yes  Patient position: sniffing  MILS maintained throughout  Mask difficulty assessment: 1 - vent by mask    Final Airway Details  Final airway type: endotracheal airway      Successful airway: ETT  Cuffed: yes   Successful intubation technique: direct laryngoscopy  Facilitating devices/methods: cricoid pressure  Endotracheal tube insertion site: oral  Blade: [facility]  Blade size: #3  ETT size (mm): 7.0  Cormack-Lehane Classification: grade IIb - view of arytenoids or posterior of glottis only  Placement verified by: chest auscultation and capnometry   Cuff volume (mL): 6  Measured from: lips  ETT to lips (cm): 22  Number of attempts at approach: 1

## 2024-05-20 NOTE — ANESTHESIA PROCEDURE NOTES
Peripheral IV  Date/Time: 5/20/2024 8:52 AM  Inserted by: CARLOS Salazar    Placement  Needle size: 18 G  Laterality: left  Location: wrist  Local anesthetic: none  Site prep: alcohol  Technique: anatomical landmarks  Attempts: 1

## 2024-05-20 NOTE — OP NOTE
Pyeloplasty Robot-Assisted (R) Operative Note     Date: 2024  OR Location: [facility] OR    Name: Jesica Brown, : 1960, Age: 63 y.o., MRN: [id number], Sex: female    Diagnosis  Pre-op Diagnosis     * UPJ obstruction, acquired [N13.5] Post-op Diagnosis     * UPJ obstruction, acquired [N13.5]     Procedures  1.  Laparoscopic robotic assisted right pyeloplasty  2.  Antegrade right ureteral stent placement    Surgeons      * Jose Martin Lawrence - Primary    Resident/Fellow/Other Assistant:  Surgeons and Role:     * Lilibeth Smith MD - Resident - Assisting    Procedure Summary  Anesthesia: General  ASA: II  Anesthesia Staff: Anesthesiologist: Miley Gibson MD  C-AA: CARLOS Salazar  Estimated Blood Loss: 5 mL  Intra-op Medications:   Administrations occurring from 0820 to 1250 on 24:   Medication Name Total Dose   ciprofloxacin (Cipro) IVPB 400 mg 400 mg              Anesthesia Record               Intraprocedure I/O Totals       None           Specimen:   ID Type Source Tests Collected by Time   1 : URETEROPELVIC JUNCTION- RIGHT Tissue KIDNEY URETEROPELVIC JUNCTION BIOPSY RIGHT SURGICAL PATHOLOGY EXAM Jose Martin Lawrence MD 2024 0946        Staff:   Scrub Person: Ashley Castillo; Penelope Trevino RN         Drains and/or Catheters:   Closed/Suction Drain RUQ 15 Fr. (Active)       Urethral Catheter (Active)       Tourniquet Times:         Implants:  Implants       Type Name Action Serial No.      Stent STENT, URETERAL PERCUFLEX 6 X [id number] - [id number] Implanted               Findings: high insertion of ureter into ureteropelvic junction with stenotic ureteropelvic junction    Indications: Jesica Brown is an 63 y.o. female who is having surgery for UPJ obstruction, acquired [N13.5]. She has had recurrent pain with a renal Lasix scan which demonstrated delayed excretion of contrast. She has relatively equal split function.     The patient was seen in the preoperative area. The risks, benefits,  complications, treatment options, non-operative alternatives, expected recovery and outcomes were discussed with the patient. The possibilities of reaction to medication, pulmonary aspiration, injury to surrounding structures, bleeding, recurrent infection, the need for additional procedures, failure to diagnose a condition, and creating a complication requiring transfusion or operation were discussed with the patient. The patient concurred with the proposed plan, giving informed consent.  The site of surgery was properly noted/marked if necessary per policy. The patient has been actively warmed in preoperative area. Preoperative antibiotics have been ordered and given within 1 hours of incision. Venous thrombosis prophylaxis have been ordered including bilateral sequential compression devices    Procedure Details: Patient consented to procedure in preoperative area. Risks and benefits discussed. Allergies were reviewed and preoperative antibiotics were administered with ciprofloxacin. Patient was brought to the operating room and placed in supine position on the operating room table. A timeout was performed. All were in agreement.     A trevino catheter was placed. The patient was flexed and secured to the table. Next, the patient was prepped and draped in normal sterile fashion. Pneumoperitoneum was obtained by placing a Veress needle in the area lateral to the umbilicus. A low pressure, high flow pneumoperitoneum was adequately obtained using CO2 gas and insufflation was set to 15 mm Hg.     Next, an 8-mm camera port was placed at the lateral border of the rectus. The camera was inserted, and no injuries were seen. An 8 mm port was placed cephalad and lateral to this along the near the costal margin, along with an additional inferolateral port. An 12 mm assistant port was placed between the two cephalad ports in the periumbilical scar. An additional 5 mm port was placed for liver retraction.     The robot was  docked and the instruments passed. We started by taking down the right colon to expose the retroperitoneum. We made an incision over the renal pelvis, and at this point, the kidney was in clear view, and the Gerota's fascia was entered. Initial attention was directed at careful dissection of the renal pelvis and proximal ureter which was done with a combination of electrocautery and blunt dissection.     No crossing vessel or otherwise extrinsic compressive etiology was identified. We carefully dissected out the vasculature and ureter to begin our subsequent repair. Evaluation of the proximal ureter appeared to demonstrate circumferential narrowing which could be contributing to the obstructive symptoms. A stay suture using 3-0 vicryl was placed in the renal pelvis, then the ureteropelvic junction was dismembered. This revealed a stenotic proximal ureter. The pelvis defect was oversewn in a figure of 8 using 3-0 vicryl. The stenotic segment of the ureter was divided and sent for pathology. A stay suture on the medial aspect of the ureter was placed for orientation. The ureter was then spatulated and a 6x26 JJ stent placed in an antegrade fashion.     A small incision was then made in the renal pelvis and the contents aspirated. The pyelotomy was extended laterally so that the renal collecting system could be directly inspected.     The bladder was then backfilled with normal saline and methylene blue until it was seen refluxing up the ureter through the stent. The proximal curl of the stent was placed in the renal pelvis. We then placed a crotch stitch at the apex of the ureter and pyelotomy. The anastomosis was completed in a running fashion bidirectionally using 3-0 V-loc.    Once the renal pelvis was closed, we inspected closely for methylene blue extravasation and hemostasis. Hemostasis appeared excellent at this point. There was no obvious urine extravasation. Tisseel was applied over the anastomosis. We closed  the retroperitoneum using a 3-0 V-loc. A CATE drain was placed through the inferior most left sided robot port and sutured into place.     At this time, the procedure was completed. The robot was undocked. Trocars were removed under direct vision. The assistant port fascia was closed with 0 Vicryl. All skin port sites were closed with 4-0 Vicryl in a subcuticular fashion and Dermabond was applied over this. The patient tolerated the procedure well and no immediate perioperative complication was noted.    Complications:  None; patient tolerated the procedure well.    Disposition: PACU - hemodynamically stable.  Condition: stable         Additional Details: Patient will have trevino catheter removed tomorrow. She will return to the office for stent removal in 3 weeks.     Attending Attestation: I was present and scrubbed for the entire procedure.    Jose Martin Lawrence  Phone Number: [phone]

## 2024-05-21 VITALS
DIASTOLIC BLOOD PRESSURE: 66 MMHG | HEIGHT: 62 IN | OXYGEN SATURATION: 93 % | BODY MASS INDEX: 22.45 KG/M2 | WEIGHT: 122 LBS | RESPIRATION RATE: 16 BRPM | HEART RATE: 74 BPM | TEMPERATURE: 98.6 F | SYSTOLIC BLOOD PRESSURE: 100 MMHG

## 2024-05-21 LAB
ANION GAP SERPL CALC-SCNC: 10 MMOL/L (ref 10–20)
BUN SERPL-MCNC: 8 MG/DL (ref 6–23)
CALCIUM SERPL-MCNC: 8.4 MG/DL (ref 8.6–10.3)
CHLORIDE SERPL-SCNC: 103 MMOL/L (ref 98–107)
CO2 SERPL-SCNC: 27 MMOL/L (ref 21–32)
CREAT SERPL-MCNC: 0.64 MG/DL (ref 0.5–1.05)
EGFRCR SERPLBLD CKD-EPI 2021: >90 ML/MIN/1.73M*2
ERYTHROCYTE [DISTWIDTH] IN BLOOD BY AUTOMATED COUNT: 13.5 % (ref 11.5–14.5)
GLUCOSE SERPL-MCNC: 93 MG/DL (ref 74–99)
HCT VFR BLD AUTO: 33.1 % (ref 36–46)
HGB BLD-MCNC: 10.7 G/DL (ref 12–16)
MCH RBC QN AUTO: 30.9 PG (ref 26–34)
MCHC RBC AUTO-ENTMCNC: 32.3 G/DL (ref 32–36)
MCV RBC AUTO: 96 FL (ref 80–100)
NRBC BLD-RTO: 0 /100 WBCS (ref 0–0)
PLATELET # BLD AUTO: 164 X10*3/UL (ref 150–450)
POTASSIUM SERPL-SCNC: 3.6 MMOL/L (ref 3.5–5.3)
RBC # BLD AUTO: 3.46 X10*6/UL (ref 4–5.2)
SODIUM SERPL-SCNC: 136 MMOL/L (ref 136–145)
WBC # BLD AUTO: 8.1 X10*3/UL (ref 4.4–11.3)

## 2024-05-21 PROCEDURE — 99232 SBSQ HOSP IP/OBS MODERATE 35: CPT | Performed by: NURSE PRACTITIONER

## 2024-05-21 PROCEDURE — 85027 COMPLETE CBC AUTOMATED: CPT | Performed by: STUDENT IN AN ORGANIZED HEALTH CARE EDUCATION/TRAINING PROGRAM

## 2024-05-21 PROCEDURE — 2500000004 HC RX 250 GENERAL PHARMACY W/ HCPCS (ALT 636 FOR OP/ED): Performed by: STUDENT IN AN ORGANIZED HEALTH CARE EDUCATION/TRAINING PROGRAM

## 2024-05-21 PROCEDURE — 80048 BASIC METABOLIC PNL TOTAL CA: CPT | Performed by: STUDENT IN AN ORGANIZED HEALTH CARE EDUCATION/TRAINING PROGRAM

## 2024-05-21 PROCEDURE — 2500000005 HC RX 250 GENERAL PHARMACY W/O HCPCS: Performed by: STUDENT IN AN ORGANIZED HEALTH CARE EDUCATION/TRAINING PROGRAM

## 2024-05-21 PROCEDURE — 36415 COLL VENOUS BLD VENIPUNCTURE: CPT | Performed by: STUDENT IN AN ORGANIZED HEALTH CARE EDUCATION/TRAINING PROGRAM

## 2024-05-21 PROCEDURE — 96372 THER/PROPH/DIAG INJ SC/IM: CPT | Performed by: STUDENT IN AN ORGANIZED HEALTH CARE EDUCATION/TRAINING PROGRAM

## 2024-05-21 PROCEDURE — 2500000006 HC RX 250 W HCPCS SELF ADMINISTERED DRUGS (ALT 637 FOR ALL PAYERS): Performed by: STUDENT IN AN ORGANIZED HEALTH CARE EDUCATION/TRAINING PROGRAM

## 2024-05-21 PROCEDURE — 7100000011 HC EXTENDED STAY RECOVERY HOURLY - NURSING UNIT

## 2024-05-21 PROCEDURE — 2500000001 HC RX 250 WO HCPCS SELF ADMINISTERED DRUGS (ALT 637 FOR MEDICARE OP): Performed by: STUDENT IN AN ORGANIZED HEALTH CARE EDUCATION/TRAINING PROGRAM

## 2024-05-21 RX ORDER — ACETAMINOPHEN 325 MG/1
650 TABLET ORAL EVERY 6 HOURS PRN
Start: 2024-05-21

## 2024-05-21 RX ORDER — POLYETHYLENE GLYCOL 3350 17 G/17G
17 POWDER, FOR SOLUTION ORAL DAILY
Qty: 7 PACKET | Refills: 0 | Status: SHIPPED | OUTPATIENT
Start: 2024-05-22 | End: 2024-05-29

## 2024-05-21 RX ORDER — OXYCODONE HYDROCHLORIDE 5 MG/1
5 TABLET ORAL EVERY 6 HOURS PRN
Qty: 6 TABLET | Refills: 0 | Status: SHIPPED | OUTPATIENT
Start: 2024-05-21 | End: 2024-10-10 | Stop reason: ALTCHOICE

## 2024-05-21 RX ORDER — SENNOSIDES 8.6 MG/1
2 TABLET ORAL 2 TIMES DAILY
Qty: 28 TABLET | Refills: 0 | Status: SHIPPED | OUTPATIENT
Start: 2024-05-21 | End: 2024-05-28

## 2024-05-21 RX ORDER — OXYBUTYNIN CHLORIDE 5 MG/1
5 TABLET ORAL 3 TIMES DAILY PRN
Qty: 15 TABLET | Refills: 0 | Status: SHIPPED | OUTPATIENT
Start: 2024-05-21 | End: 2024-05-26

## 2024-05-21 RX ADMIN — TAMSULOSIN HYDROCHLORIDE 0.4 MG: 0.4 CAPSULE ORAL at 08:24

## 2024-05-21 RX ADMIN — METHOCARBAMOL 500 MG: 500 TABLET ORAL at 05:46

## 2024-05-21 RX ADMIN — TAMSULOSIN HYDROCHLORIDE 0.4 MG: 0.4 CAPSULE ORAL at 08:26

## 2024-05-21 RX ADMIN — OXYCODONE HYDROCHLORIDE 10 MG: 10 TABLET ORAL at 08:29

## 2024-05-21 RX ADMIN — ACETAMINOPHEN 975 MG: 325 TABLET ORAL at 01:53

## 2024-05-21 RX ADMIN — METHOCARBAMOL 500 MG: 500 TABLET ORAL at 00:04

## 2024-05-21 RX ADMIN — SENNOSIDES 17.2 MG: 8.6 TABLET, FILM COATED ORAL at 08:24

## 2024-05-21 RX ADMIN — LIDOCAINE 4% 1 PATCH: 40 PATCH TOPICAL at 08:23

## 2024-05-21 RX ADMIN — METHOCARBAMOL 500 MG: 500 TABLET ORAL at 12:40

## 2024-05-21 RX ADMIN — OXYCODONE HYDROCHLORIDE 5 MG: 5 TABLET ORAL at 12:38

## 2024-05-21 RX ADMIN — HEPARIN SODIUM 5000 UNITS: 5000 INJECTION INTRAVENOUS; SUBCUTANEOUS at 05:46

## 2024-05-21 RX ADMIN — ACETAMINOPHEN 975 MG: 325 TABLET ORAL at 12:40

## 2024-05-21 RX ADMIN — SIMETHICONE 160 MG: 80 TABLET, CHEWABLE ORAL at 08:24

## 2024-05-21 RX ADMIN — OXYCODONE HYDROCHLORIDE 10 MG: 10 TABLET ORAL at 01:54

## 2024-05-21 RX ADMIN — ACETAMINOPHEN 975 MG: 325 TABLET ORAL at 08:24

## 2024-05-21 RX ADMIN — SODIUM CHLORIDE, POTASSIUM CHLORIDE, SODIUM LACTATE AND CALCIUM CHLORIDE 50 ML/HR: 600; 310; 30; 20 INJECTION, SOLUTION INTRAVENOUS at 00:05

## 2024-05-21 RX ADMIN — SIMETHICONE 160 MG: 80 TABLET, CHEWABLE ORAL at 12:40

## 2024-05-21 ASSESSMENT — PAIN - FUNCTIONAL ASSESSMENT
PAIN_FUNCTIONAL_ASSESSMENT: 0-10

## 2024-05-21 ASSESSMENT — COGNITIVE AND FUNCTIONAL STATUS - GENERAL
DAILY ACTIVITIY SCORE: 24
MOBILITY SCORE: 24

## 2024-05-21 ASSESSMENT — PAIN DESCRIPTION - ORIENTATION
ORIENTATION: ANTERIOR
ORIENTATION: ANTERIOR
ORIENTATION: RIGHT
ORIENTATION: RIGHT

## 2024-05-21 ASSESSMENT — PAIN SCALES - PAIN ASSESSMENT IN ADVANCED DEMENTIA (PAINAD)
CONSOLABILITY: NO NEED TO CONSOLE
BREATHING: NORMAL
TOTALSCORE: MEDICATION (SEE MAR)
BODYLANGUAGE: RELAXED
TOTALSCORE: 0
BREATHING: NORMAL
FACIALEXPRESSION: SMILING OR INEXPRESSIVE

## 2024-05-21 ASSESSMENT — PAIN SCALES - GENERAL
PAINLEVEL_OUTOF10: 4
PAINLEVEL_OUTOF10: 3
PAINLEVEL_OUTOF10: 3
PAINLEVEL_OUTOF10: 7
PAINLEVEL_OUTOF10: 3
PAINLEVEL_OUTOF10: 9
PAINLEVEL_OUTOF10: 5 - MODERATE PAIN
PAINLEVEL_OUTOF10: 7

## 2024-05-21 ASSESSMENT — PAIN DESCRIPTION - LOCATION
LOCATION: ABDOMEN

## 2024-05-21 ASSESSMENT — PAIN SCALES - WONG BAKER
WONGBAKER_NUMERICALRESPONSE: HURTS LITTLE MORE
WONGBAKER_NUMERICALRESPONSE: HURTS LITTLE MORE
WONGBAKER_NUMERICALRESPONSE: HURTS LITTLE BIT

## 2024-05-21 NOTE — PROGRESS NOTES
05/21/24 1057   Discharge Planning   Living Arrangements Alone   Support Systems Children   Type of Residence Private residence   Home or Post Acute Services None   Patient expects to be discharged to: Home   Does the patient need discharge transport arranged? No     Met with patient at bedside. Admitted for ureteropelvic junction obstruction and stent. Pt lives alone and was independent PTA with no HHC or DME. PCP is Dr Mahesh Ocampo. Pt is able to manage her health and understands her medications. Able to drive and obtain meds. Pt plans to return home with no new discharge needs. Family will provide transport.

## 2024-05-21 NOTE — PROGRESS NOTES
Jesica Brown 63 y.o. female    Subjective  Patient seen and examined this morning.  Denies nausea and vomiting.  No fever or chills. Tolerating diet.  Pain controlled. No flatus yet.  Urinating well since trevino removed. Up ambulating. Denies CP and SOB. No acute events overnight.    Objective  PHYSICAL EXAM:  Physical Exam  Vitals reviewed.   Constitutional:       General: She is awake.      Appearance: Normal appearance.   Cardiovascular:      Rate and Rhythm: Normal rate and regular rhythm.      Pulses: Normal pulses.      Heart sounds: Normal heart sounds.   Pulmonary:      Effort: Pulmonary effort is normal.      Breath sounds: Normal breath sounds and air entry.   Abdominal:      General: Abdomen is flat. There is no distension.      Palpations: Abdomen is soft.      Comments: Soft, non-distended, incisions well approximated with glue.  CATE drain with SS drainage.  Appropriately TTP.    Musculoskeletal:         General: Normal range of motion.      Cervical back: Normal range of motion.   Skin:     General: Skin is warm and dry.   Neurological:      General: No focal deficit present.      Mental Status: She is alert and oriented to person, place, and time.   Psychiatric:         Behavior: Behavior is cooperative.         Vital signs in last 24 hours:  Vitals:    05/21/24 0800   BP: 101/72   Pulse: 61   Resp: 16   Temp: 36.3 °C (97.3 °F)   SpO2: 96%         Intake/Output this shift:    Intake/Output Summary (Last 24 hours) at 5/21/2024 0953  Last data filed at 5/21/2024 0925  Gross per 24 hour   Intake 2158.33 ml   Output 1985 ml   Net 173.33 ml        Allergies:  Allergies   Allergen Reactions    Lisinopril Cough, GI Upset and Constipation    Lactose GI Upset    Penicillins Hives and Rash        Medications:  Scheduled medications  acetaminophen, 975 mg, oral, q6h  fluticasone, 2 spray, Each Nostril, Daily  heparin (porcine), 5,000 Units, subcutaneous, q8h  lidocaine, 1 patch, transdermal,  Daily  methocarbamol, 500 mg, oral, q6h SHALA  polyethylene glycol, 17 g, oral, Daily  sennosides, 2 tablet, oral, BID  simethicone, 160 mg, oral, TID after meals  tamsulosin, 0.4 mg, oral, Daily      Continuous medications  lactated Ringer's, 50 mL/hr, Last Rate: 50 mL/hr (05/21/24 0005)  sodium chloride 0.9%, 100 mL/hr      PRN medications  PRN medications: bisacodyl, budesonide, famotidine, formoterol, HYDROmorphone, ipratropium, melatonin, naloxone, naloxone, naloxone, ondansetron ODT **OR** ondansetron, oxybutynin, oxyCODONE, oxyCODONE, phenazopyridine       Labs:  Results for orders placed or performed during the hospital encounter of 05/20/24 (from the past 24 hour(s))   CBC   Result Value Ref Range    WBC 8.1 4.4 - 11.3 x10*3/uL    nRBC 0.0 0.0 - 0.0 /100 WBCs    RBC 3.46 (L) 4.00 - 5.20 x10*6/uL    Hemoglobin 10.7 (L) 12.0 - 16.0 g/dL    Hematocrit 33.1 (L) 36.0 - 46.0 %    MCV 96 80 - 100 fL    MCH 30.9 26.0 - 34.0 pg    MCHC 32.3 32.0 - 36.0 g/dL    RDW 13.5 11.5 - 14.5 %    Platelets 164 150 - 450 x10*3/uL   Basic metabolic panel   Result Value Ref Range    Glucose 93 74 - 99 mg/dL    Sodium 136 136 - 145 mmol/L    Potassium 3.6 3.5 - 5.3 mmol/L    Chloride 103 98 - 107 mmol/L    Bicarbonate 27 21 - 32 mmol/L    Anion Gap 10 10 - 20 mmol/L    Urea Nitrogen 8 6 - 23 mg/dL    Creatinine 0.64 0.50 - 1.05 mg/dL    eGFR >90 >60 mL/min/1.73m*2    Calcium 8.4 (L) 8.6 - 10.3 mg/dL        Imaging:  No results found.     Plan  UPJ obstruction, acquired     POD#1:  S/p laparoscopic robotic assisted right pyeloplasty, antegrade right ureteral stent placement     - surgery as above  - avss  - Diet: regular   - Pain control  - Nausea: antiemetics PRN  - Encourage OOB and IS  - Heparin for DVT prophylaxis  - Remove CATE drain  - Remove trevino catheter  - Bowel regimen   - DC IVF    #Bladder spasms  - Continue oxybutynin    Plan of care discussed with Dr. Lawrence and patient may discharge home.  Home medications and  prescriptions as discussed.  Follow up as scheduled.     CLARICE Fuentes-CNP    I spent 35 minutes in the professional and overall care of this patient.

## 2024-05-21 NOTE — NURSING NOTE
Discharge instructions with new medication education given to patient and friend who verbalized understanding; also given dressings and instructions on changing of CATE site  Saline locks X3 dc'd intact and pressure drsg applied

## 2024-05-21 NOTE — CARE PLAN
The patient's goals for the shift include      The clinical goals for the shift include pain control and voiding without catheter

## 2024-05-21 NOTE — CARE PLAN
The patient's goals for the shift include      The clinical goals for the shift include pt will demostrate comfort aeb sleeping in intervals of 3 hours or greater    Over the shift, the patient did make progress towards her goals this shift, pt has been out of bed ambulated in room, with 1 person assist, pt has rating pain as high as 7-8 in r quad, receiving oxy 10mg , with good effect pt able to sleep in long intervals , pt has remained safe and using ncl as needed

## 2024-05-22 ENCOUNTER — TELEPHONE (OUTPATIENT)
Dept: PRIMARY CARE | Facility: CLINIC | Age: 64
End: 2024-05-22
Payer: COMMERCIAL

## 2024-05-22 NOTE — TELEPHONE ENCOUNTER
Pt called to inform us that she was released from the hospital yesterday after having surgery on Monday. She is doing good, but can schedule a follow up if necessary.

## 2024-05-22 NOTE — DISCHARGE SUMMARY
Discharge Diagnosis  UPJ obstruction, acquired    Issues Requiring Follow-Up  Stent removal 3 weeks    Test Results Pending At Discharge  Pending Labs       Order Current Status    Surgical Pathology Exam In process            Hospital Course   Patient was admitted on 520 and underwent robotic right pyeloplasty.  Postoperative course was uncomplicated.  Drain and [place] was removed on postoperative day #1.  Patient will follow-up in 3 weeks for stent removal    Pertinent Physical Exam At Time of Discharge  Physical Exam    Home Medications     Medication List      START taking these medications     acetaminophen 325 mg tablet; Commonly known as: Tylenol; Take 2 tablets   (650 mg) by mouth every 6 hours if needed for mild pain (1 - 3).   oxybutynin 5 mg tablet; Commonly known as: Ditropan; Take 1 tablet (5   mg) by mouth 3 times a day as needed (for bladder spasms) for up to 5   days.   oxyCODONE 5 mg immediate release tablet; Commonly known as: Roxicodone;   Take 1 tablet (5 mg) by mouth every 6 hours if needed for moderate pain (4   - 6) for up to 6 doses.   polyethylene glycol 17 gram packet; Commonly known as: Glycolax,   Miralax; Take 17 g by mouth once daily for 7 days.   sennosides 8.6 mg tablet; Commonly known as: Senokot; Take 2 tablets   (17.2 mg) by mouth 2 times a day for 7 days.     CHANGE how you take these medications     telmisartan 40 mg tablet; Commonly known as: MIcarDIS; Take 1 tablet (40   mg) by mouth once daily.; What changed: when to take this     CONTINUE taking these medications     alendronate 70 mg tablet; Commonly known as: Fosamax; Take 1 tablet (70   mg) by mouth every 7 days. Take in the morning with a full glass of water,   on an empty stomach, and do not take anything else by mouth or lie down   for the next 30 min.   Breztri Aerosphere 160-9-4.8 mcg/actuation HFA aerosol inhaler; Generic   drug: budesonide-glycopyr-formoterol; Inhale 2 puffs 2 times a day.   calcium carbonate-vitamin  D3 500 mg-5 mcg (200 unit) tablet   cycloSPORINE 0.05 % drops   famotidine 40 mg tablet; Commonly known as: Pepcid   fluticasone 50 mcg/actuation nasal spray; Commonly known as: Flonase   MULTI VITAMIN ORAL   PROBIOTIC ORAL   tamsulosin 0.4 mg 24 hr capsule; Commonly known as: Flomax; Take 1   capsule (0.4 mg) by mouth once daily.     ASK your doctor about these medications     omeprazole 40 mg DR capsule; Commonly known as: PriLOSEC; Take 1 capsule   (40 mg) by mouth once daily in the morning. Take before meals. Do not   crush or chew.   phenazopyridine 200 mg tablet; Commonly known as: Pyridium; Take 1   tablet (200 mg) by mouth 3 times a day as needed for bladder spasms.   traZODone 50 mg tablet; Commonly known as: Desyrel; Take 1 tablet (50   mg) by mouth once daily at bedtime.       Outpatient Follow-Up  Future Appointments   Date Time Provider Department [place]   6/11/2024 11:30 AM Jose Martin Lawrence MD [id number] [place]   10/10/2024 10:00 AM Marie Ocampo DO [id number] [place]       Jose Martin Lawrence MD

## 2024-05-23 LAB
LABORATORY COMMENT REPORT: NORMAL
PATH REPORT.FINAL DX SPEC: NORMAL
PATH REPORT.GROSS SPEC: NORMAL
PATH REPORT.RELEVANT HX SPEC: NORMAL
PATH REPORT.TOTAL CANCER: NORMAL

## 2024-05-28 ENCOUNTER — OFFICE VISIT (OUTPATIENT)
Dept: PULMONOLOGY | Age: 64
End: 2024-05-28
Payer: COMMERCIAL

## 2024-05-28 VITALS
HEIGHT: 62 IN | HEART RATE: 68 BPM | TEMPERATURE: 97.3 F | SYSTOLIC BLOOD PRESSURE: 116 MMHG | DIASTOLIC BLOOD PRESSURE: 78 MMHG | BODY MASS INDEX: 23 KG/M2 | WEIGHT: 125 LBS | OXYGEN SATURATION: 98 %

## 2024-05-28 DIAGNOSIS — K21.9 GASTROESOPHAGEAL REFLUX DISEASE WITHOUT ESOPHAGITIS: ICD-10-CM

## 2024-05-28 DIAGNOSIS — J45.40 MODERATE PERSISTENT ASTHMA WITHOUT COMPLICATION: ICD-10-CM

## 2024-05-28 DIAGNOSIS — J44.9 CHRONIC OBSTRUCTIVE PULMONARY DISEASE, UNSPECIFIED COPD TYPE (HCC): Primary | ICD-10-CM

## 2024-05-28 PROBLEM — J45.20 MILD INTERMITTENT ASTHMA WITHOUT COMPLICATION: Status: ACTIVE | Noted: 2024-05-28

## 2024-05-28 PROCEDURE — 3078F DIAST BP <80 MM HG: CPT | Performed by: INTERNAL MEDICINE

## 2024-05-28 PROCEDURE — 99214 OFFICE O/P EST MOD 30 MIN: CPT | Performed by: INTERNAL MEDICINE

## 2024-05-28 PROCEDURE — 3074F SYST BP LT 130 MM HG: CPT | Performed by: INTERNAL MEDICINE

## 2024-05-28 RX ORDER — ALBUTEROL SULFATE 90 UG/1
2 AEROSOL, METERED RESPIRATORY (INHALATION) 4 TIMES DAILY PRN
Qty: 18 G | Refills: 0 | Status: SHIPPED | OUTPATIENT
Start: 2024-05-28

## 2024-05-28 ASSESSMENT — ENCOUNTER SYMPTOMS
SORE THROAT: 0
VOMITING: 0
RHINORRHEA: 0
TROUBLE SWALLOWING: 0
WHEEZING: 0
CHEST TIGHTNESS: 0
DIARRHEA: 0
SHORTNESS OF BREATH: 0
EYE ITCHING: 0
SINUS PRESSURE: 0
EYE DISCHARGE: 0
NAUSEA: 0
VOICE CHANGE: 0
COUGH: 0
ABDOMINAL PAIN: 0

## 2024-05-28 NOTE — PROGRESS NOTES
Subjective:             Jenny Pinto is a 63 y.o. female who complains today of:     Chief Complaint   Patient presents with    Follow-up     4month     COPD     All is good        HPI  She was seen by allergist ,  asthma and rhinitis symptoms appear well-controlled and at this time current time , defer on biologic therapy or allergen immunotherapy at the current time.   She is on breztri HFA  2 puff BID . Albuterol HFA prn .  C/o shortness of breath  with exertion.  She has occasional cough or chest tightness.   No wheezing .  No Chest pain with radiation  or pleuritic pain.  No Hemoptysis.   No  leg edema.   No orthopnea.  No Fever or chills.   No Rhinorrhea and postnasal drip.  On nasal spray .      She was seen by dr. Nelson 3/11/24     She had CXR show COPD     PFT  FVC/FEV1 both decreased with normal ratio with mildly  decreased midflow suggest, there is possibility of obstruction at small  airway level.  There is positive response to bronchodilator, most at  midflow level.  Lung volume study suggests significant air trapping and  hyperinflation.  Diffusion capacity is within normal range.  Airway  resistance is increased.    Allergies:  Lisinopril, Molds & smuts, Milk-related compounds, Pollen extract, and Penicillins  Past Medical History:   Diagnosis Date    Arthritis of both knees     Arthritis of right hand     Fibroadenoma of breast, right      Past Surgical History:   Procedure Laterality Date    BREAST ENHANCEMENT SURGERY      BREAST FIBROADENOMA SURGERY       SECTION      PARTIAL HYSTERECTOMY (CERVIX NOT REMOVED)      TONSILLECTOMY      WISDOM TOOTH EXTRACTION       Family History   Problem Relation Age of Onset    Cancer Mother         lung    High Blood Pressure Mother     High Blood Pressure Father     Diabetes Neg Hx     Kidney Disease Neg Hx     Stroke Neg Hx      Social History     Socioeconomic History    Marital status:      Spouse name: Not on file    Number of

## 2024-06-04 ENCOUNTER — APPOINTMENT (OUTPATIENT)
Dept: UROLOGY | Facility: CLINIC | Age: 64
End: 2024-06-04
Payer: COMMERCIAL

## 2024-06-11 ENCOUNTER — PROCEDURE VISIT (OUTPATIENT)
Dept: UROLOGY | Facility: CLINIC | Age: 64
End: 2024-06-11
Payer: COMMERCIAL

## 2024-06-11 VITALS — SYSTOLIC BLOOD PRESSURE: 118 MMHG | DIASTOLIC BLOOD PRESSURE: 72 MMHG | HEART RATE: 67 BPM | TEMPERATURE: 97.8 F

## 2024-06-11 DIAGNOSIS — N13.39 OTHER HYDRONEPHROSIS: ICD-10-CM

## 2024-06-11 LAB
POC APPEARANCE, URINE: CLEAR
POC BILIRUBIN, URINE: NEGATIVE
POC BLOOD, URINE: ABNORMAL
POC COLOR, URINE: YELLOW
POC GLUCOSE, URINE: NEGATIVE MG/DL
POC KETONES, URINE: NEGATIVE MG/DL
POC LEUKOCYTES, URINE: ABNORMAL
POC NITRITE,URINE: NEGATIVE
POC PH, URINE: 6 PH
POC PROTEIN, URINE: ABNORMAL MG/DL
POC SPECIFIC GRAVITY, URINE: >=1.03
POC UROBILINOGEN, URINE: 0.2 EU/DL

## 2024-06-11 PROCEDURE — 81003 URINALYSIS AUTO W/O SCOPE: CPT | Performed by: UROLOGY

## 2024-06-11 PROCEDURE — 52310 CYSTOSCOPY AND TREATMENT: CPT | Performed by: UROLOGY

## 2024-06-11 RX ORDER — ALBUTEROL SULFATE 90 UG/1
2 AEROSOL, METERED RESPIRATORY (INHALATION)
COMMUNITY
Start: 2024-05-28

## 2024-06-11 NOTE — PROGRESS NOTES
Subjective   Patient ID: Jesica Brown is a 63 y.o. female who presents for cysto/stent removal. Patient is s/p Laparoscopic robotic assisted right pyeloplasty, Antegrade right ureteral stent placement on 5/20/24.    HPI    Review of Systems  A 12 system review was completed and is negative with the exception of those signs and symptoms noted in the history of present illness.    Objective   Physical Exam  General: in NAD, appears stated age  Head: normocephalic, atraumatic  Respiratory: normal effort, no use of accessory muscles  Cardiovascular: no edema noted  Skin: normal turgor, no rashes  Neurologic: grossly intact, oriented to person/place/time  Psychiatric: mode and affect appropriate     Procedure  Cystoscopy for right stent removal  Patient's genitalia were prepped and draped in the usual sterile fashion. A 15 Portuguese flexible cystoscope was passed atraumatically per the urethra. We entered the bladder and identified the previously placed ureteral stent emanating from the right ureteral orifice. It was grasped with a flexible grasper and removed intact. Patient tolerated the procedure without difficulty.     Assessment/Plan   Problem List Items Addressed This Visit             ICD-10-CM    Hydronephrosis N13.30    Relevant Orders    POCT UA Automated manually resulted (Completed)    Cystourethroscopy (Completed)    NM kidney flow function w diuretic     Patient tolerated the procedure well. Order Lasix Renal Scan for 6 to 8 weeks and follow-up with results.     Scribe Attestation  By signing my name below, INgozi Scribe   attest that this documentation has been prepared under the direction and in the presence of Jose Martin Lawrence MD.

## 2024-07-30 ENCOUNTER — HOSPITAL ENCOUNTER (OUTPATIENT)
Dept: RADIOLOGY | Facility: HOSPITAL | Age: 64
Discharge: HOME | End: 2024-07-30
Payer: COMMERCIAL

## 2024-07-30 DIAGNOSIS — N13.39 OTHER HYDRONEPHROSIS: ICD-10-CM

## 2024-07-30 PROCEDURE — A9562 TC99M MERTIATIDE: HCPCS | Performed by: UROLOGY

## 2024-07-30 PROCEDURE — 78708 K FLOW/FUNCT IMAGE W/DRUG: CPT | Performed by: STUDENT IN AN ORGANIZED HEALTH CARE EDUCATION/TRAINING PROGRAM

## 2024-07-30 PROCEDURE — 3430000001 HC RX 343 DIAGNOSTIC RADIOPHARMACEUTICALS: Performed by: UROLOGY

## 2024-07-30 PROCEDURE — 78708 K FLOW/FUNCT IMAGE W/DRUG: CPT

## 2024-07-30 RX ORDER — BETIATIDE 1 MG/1
11.5 INJECTION, POWDER, LYOPHILIZED, FOR SOLUTION INTRAVENOUS
Status: COMPLETED | OUTPATIENT
Start: 2024-07-30 | End: 2024-07-30

## 2024-08-06 ENCOUNTER — APPOINTMENT (OUTPATIENT)
Dept: UROLOGY | Facility: CLINIC | Age: 64
End: 2024-08-06
Payer: COMMERCIAL

## 2024-08-06 VITALS — SYSTOLIC BLOOD PRESSURE: 131 MMHG | TEMPERATURE: 97.8 F | DIASTOLIC BLOOD PRESSURE: 80 MMHG | HEART RATE: 80 BPM

## 2024-08-06 DIAGNOSIS — R35.0 URINARY FREQUENCY: ICD-10-CM

## 2024-08-06 DIAGNOSIS — R33.9 RETENTION OF URINE: Primary | ICD-10-CM

## 2024-08-06 DIAGNOSIS — N13.39 OTHER HYDRONEPHROSIS: ICD-10-CM

## 2024-08-06 LAB
POC APPEARANCE, URINE: CLEAR
POC BILIRUBIN, URINE: NEGATIVE
POC BLOOD, URINE: NEGATIVE
POC COLOR, URINE: YELLOW
POC GLUCOSE, URINE: NEGATIVE MG/DL
POC KETONES, URINE: NEGATIVE MG/DL
POC LEUKOCYTES, URINE: NEGATIVE
POC NITRITE,URINE: NEGATIVE
POC PH, URINE: 5.5 PH
POC PROTEIN, URINE: NEGATIVE MG/DL
POC SPECIFIC GRAVITY, URINE: 1.01
POC UROBILINOGEN, URINE: 0.2 EU/DL

## 2024-08-06 PROCEDURE — 81003 URINALYSIS AUTO W/O SCOPE: CPT | Performed by: UROLOGY

## 2024-08-06 PROCEDURE — 51798 US URINE CAPACITY MEASURE: CPT | Performed by: UROLOGY

## 2024-08-06 PROCEDURE — 99214 OFFICE O/P EST MOD 30 MIN: CPT | Performed by: UROLOGY

## 2024-08-06 PROCEDURE — 3079F DIAST BP 80-89 MM HG: CPT | Performed by: UROLOGY

## 2024-08-06 PROCEDURE — 3075F SYST BP GE 130 - 139MM HG: CPT | Performed by: UROLOGY

## 2024-08-06 RX ORDER — TAMSULOSIN HYDROCHLORIDE 0.4 MG/1
0.4 CAPSULE ORAL 2 TIMES DAILY
Qty: 60 CAPSULE | Refills: 2 | Status: SHIPPED | OUTPATIENT
Start: 2024-08-06 | End: 2024-11-04

## 2024-08-06 NOTE — PROGRESS NOTES
Subjective   Patient ID: Jesica Brown is a 63 y.o. female who presents for Follow-up. Last seen 6/11/24 when Patient tolerated the procedure well. Order Lasix Renal Scan for 6 to 8 weeks and follow-up with results.     HPI  She states her bladder is not working well. She feels the imaging test created urinary symptoms possibly from the dye. She relates the day after the test she was having slow urine output and drinking lots of water. The feeling she was not fully empty has persisted until yesterday. She restarted taking tamsulosin. She does not have side effects from tamsulosin.     She has intermittent sore throat.     Lab Results   Component Value Date    COLORU Yellow 08/06/2024    APPEARANCEU Clear 08/06/2024    SPECGRAVU 1.010 08/06/2024    GE 5.5 08/06/2024    PROTUR NEGATIVE 08/06/2024    GLUCOSEU NEGATIVE 08/06/2024    BLOODU NEGATIVE 08/06/2024    KETONESU NEGATIVE 08/06/2024    BILIRUBINU NEGATIVE 08/06/2024    UROBILINOGEN 0.2 08/06/2024    UROBILINOGEN NEGATIVE 08/06/2024    NITRITEU NEGATIVE 08/06/2024    LEUKOCYTESU TRACE (A) 04/16/2024     Lasix Renal Scan  IMPRESSION:  1. Normal functioning right kidney with interval resolution of  high-grade obstruction. Radiotracer retention in the collecting  system with good response to Lasix, suggestive of reservoir effect.  2. Normal functioning left kidney without evidence of obstruction.  Very slow spontaneous washout with good response to Lasix suggestive  of reservoir effect, this is new from prior exam.  3. Differential kidney function is 52.1% on the left and 47.9% on the  right, similar to prior exam.    Review of Systems  A 12 system review was completed and is negative with the exception of those signs and symptoms noted in the history of present illness.    Objective   Physical Exam  General: in NAD, appears stated age  Head: normocephalic, atraumatic  Respiratory: normal effort, no use of accessory muscles  Cardiovascular: no edema noted  Skin:  normal turgor, no rashes  Neurologic: grossly intact, oriented to person/place/time  Psychiatric: mode and affect appropriate     Assessment/Plan   Problem List Items Addressed This Visit             ICD-10-CM    Hydronephrosis N13.30    Relevant Orders    POCT UA Automated manually resulted (Completed)    Post-Void Residual (Completed)    Follow Up In Urology     Other Visit Diagnoses         Codes    Retention of urine    -  Primary R33.9    Urinary frequency     R35.0    Relevant Medications    tamsulosin (Flomax) 0.4 mg 24 hr capsule          We reviewed the Lasix scan results together. Even though her right kidney is draining slower than the left, it is draining WNL.   UA is clear. PVR today is 115 mL. Recommend she increase tamsulosin to 0.4 mg bid. Refill prescription for tamsulosin. Follow-up in 3 months for repeat PVR.     Scribe Attestation  By signing my name below, I, Vanesa Garay   attest that this documentation has been prepared under the direction and in the presence of Jose Martin Lawrence MD.

## 2024-09-12 DIAGNOSIS — M81.0 POSTMENOPAUSAL OSTEOPOROSIS: ICD-10-CM

## 2024-09-12 RX ORDER — ALENDRONATE SODIUM 70 MG/1
TABLET ORAL
Qty: 12 TABLET | Refills: 3 | Status: SHIPPED | OUTPATIENT
Start: 2024-09-12

## 2024-09-26 ENCOUNTER — APPOINTMENT (OUTPATIENT)
Dept: PRIMARY CARE | Facility: CLINIC | Age: 64
End: 2024-09-26
Payer: COMMERCIAL

## 2024-10-10 ENCOUNTER — APPOINTMENT (OUTPATIENT)
Dept: PRIMARY CARE | Facility: CLINIC | Age: 64
End: 2024-10-10
Payer: COMMERCIAL

## 2024-10-10 VITALS
HEART RATE: 70 BPM | HEIGHT: 62 IN | DIASTOLIC BLOOD PRESSURE: 62 MMHG | SYSTOLIC BLOOD PRESSURE: 118 MMHG | TEMPERATURE: 98.1 F | BODY MASS INDEX: 20.24 KG/M2 | RESPIRATION RATE: 14 BRPM | WEIGHT: 110 LBS | OXYGEN SATURATION: 97 %

## 2024-10-10 DIAGNOSIS — Z12.31 ENCOUNTER FOR SCREENING MAMMOGRAM FOR MALIGNANT NEOPLASM OF BREAST: ICD-10-CM

## 2024-10-10 DIAGNOSIS — Z80.41 FAMILY HX OF OVARIAN MALIGNANCY: ICD-10-CM

## 2024-10-10 DIAGNOSIS — Z00.00 WELLNESS EXAMINATION: ICD-10-CM

## 2024-10-10 DIAGNOSIS — E55.9 VITAMIN D DEFICIENCY: ICD-10-CM

## 2024-10-10 DIAGNOSIS — S22.000S COMPRESSION FRACTURE OF THORACIC VERTEBRA, UNSPECIFIED THORACIC VERTEBRAL LEVEL, SEQUELA: ICD-10-CM

## 2024-10-10 DIAGNOSIS — R63.4 WEIGHT LOSS: ICD-10-CM

## 2024-10-10 DIAGNOSIS — M81.0 POSTMENOPAUSAL OSTEOPOROSIS: Primary | ICD-10-CM

## 2024-10-10 DIAGNOSIS — R07.81 RIB PAIN ON LEFT SIDE: ICD-10-CM

## 2024-10-10 PROBLEM — M19.90 ARTHRITIS: Status: RESOLVED | Noted: 2022-01-03 | Resolved: 2024-10-10

## 2024-10-10 PROBLEM — G47.00 INSOMNIA: Status: RESOLVED | Noted: 2023-02-04 | Resolved: 2024-10-10

## 2024-10-10 PROBLEM — Z86.79 HISTORY OF HYPERTENSION: Status: RESOLVED | Noted: 2024-03-20 | Resolved: 2024-10-10

## 2024-10-10 PROBLEM — R92.30 DENSE BREAST TISSUE ON MAMMOGRAM: Status: RESOLVED | Noted: 2023-02-04 | Resolved: 2024-10-10

## 2024-10-10 PROBLEM — N13.5 URETEROPELVIC JUNCTION OBSTRUCTION: Status: RESOLVED | Noted: 2024-05-20 | Resolved: 2024-10-10

## 2024-10-10 PROBLEM — K21.00 GASTRO-ESOPHAGEAL REFLUX DISEASE WITH ESOPHAGITIS: Status: RESOLVED | Noted: 2024-03-20 | Resolved: 2024-10-10

## 2024-10-10 PROBLEM — Z84.81 FAMILY HISTORY OF BRCA GENE MUTATION: Status: RESOLVED | Noted: 2024-03-20 | Resolved: 2024-10-10

## 2024-10-10 PROBLEM — K21.9 GERD (GASTROESOPHAGEAL REFLUX DISEASE): Status: RESOLVED | Noted: 2023-02-04 | Resolved: 2024-10-10

## 2024-10-10 PROBLEM — K59.00 CONSTIPATION: Status: RESOLVED | Noted: 2023-11-13 | Resolved: 2024-10-10

## 2024-10-10 PROBLEM — Z12.11 COLON CANCER SCREENING: Status: RESOLVED | Noted: 2023-11-13 | Resolved: 2024-10-10

## 2024-10-10 PROCEDURE — 3074F SYST BP LT 130 MM HG: CPT | Performed by: INTERNAL MEDICINE

## 2024-10-10 PROCEDURE — 81002 URINALYSIS NONAUTO W/O SCOPE: CPT | Performed by: INTERNAL MEDICINE

## 2024-10-10 PROCEDURE — 1036F TOBACCO NON-USER: CPT | Performed by: INTERNAL MEDICINE

## 2024-10-10 PROCEDURE — 3008F BODY MASS INDEX DOCD: CPT | Performed by: INTERNAL MEDICINE

## 2024-10-10 PROCEDURE — 3078F DIAST BP <80 MM HG: CPT | Performed by: INTERNAL MEDICINE

## 2024-10-10 PROCEDURE — 99396 PREV VISIT EST AGE 40-64: CPT | Performed by: INTERNAL MEDICINE

## 2024-10-10 PROCEDURE — 90656 IIV3 VACC NO PRSV 0.5 ML IM: CPT | Performed by: INTERNAL MEDICINE

## 2024-10-10 PROCEDURE — 90471 IMMUNIZATION ADMIN: CPT | Performed by: INTERNAL MEDICINE

## 2024-10-10 NOTE — PROGRESS NOTES
"Subjective    Jesica Brown is a 63 y.o. female who presents for Annual Exam.  HPI    Mammogram 2023  Dexa 2023  Colonoscopy 4/3/2024 repeat in 3 years.  Her   in     Bladder surgery in April   She is on flomax. She was on it twice a day but it made  her blood pressure too low.     Injured right side/rib x 2 weeks ago. Bent over a box. Harding a pop. Pain radiating to back.the pain was more anterior and now it is   More in the back.    Needs flu vaccine   Shingrix   Tdap 10/15/2021  Pneumonia 10/15/2020      Review of Systems   All other systems reviewed and are negative.        Objective     /62 (BP Location: Left arm, Patient Position: Sitting, BP Cuff Size: Adult)   Pulse 70   Temp 36.7 °C (98.1 °F)   Resp 14   Ht 1.575 m (5' 2\")   Wt 49.9 kg (110 lb)   SpO2 97%   BMI 20.12 kg/m²    Physical Exam  Constitutional:       Appearance: Normal appearance.   Cardiovascular:      Rate and Rhythm: Normal rate and regular rhythm.      Pulses: Normal pulses.   Pulmonary:      Effort: Pulmonary effort is normal.      Breath sounds: Normal breath sounds.   Chest:      Chest wall: No mass or tenderness.   Breasts:     Right: Normal.      Left: Normal.   Abdominal:      General: Abdomen is flat.   Musculoskeletal:      Cervical back: Neck supple. No tenderness.      Comments: There is tenderness to palpation to left sided posterior lower ribs. No brusing.   Lymphadenopathy:      Cervical: No cervical adenopathy.      Upper Body:      Right upper body: No supraclavicular or axillary adenopathy.      Left upper body: No supraclavicular or axillary adenopathy.   Neurological:      Mental Status: She is alert.   Psychiatric:         Attention and Perception: Attention and perception normal.         Mood and Affect: Mood and affect normal.       Health Maintenance Due   Topic Date Due    Yearly Adult Physical  Never done    HIV Screening  Never done    MMR Vaccines (1 of 1 - Standard " series) Never done    Hepatitis C Screening  Never done    Pneumococcal Vaccine: Pediatrics (0 to 5 Years) and At-Risk Patients (6 to 64 Years) (2 of 2 - PCV) 10/15/2021    RSV Pregnant patients and/or  patients aged 60+ years (1 - 1-dose 60+ series) Never done    COVID-19 Vaccine (4 - 2023-24 season) 09/01/2024    Mammogram  09/28/2024          Assessment/Plan   Problem List Items Addressed This Visit       Vitamin D deficiency    Relevant Orders    Vitamin D 25-Hydroxy,Total (for eval of Vitamin D levels)    Compression fracture of thoracic vertebra, unspecified thoracic vertebral level, sequela     Other Visit Diagnoses       Postmenopausal osteoporosis    -  Primary    Relevant Orders    XR DEXA bone density    XR thoracic spine 3 views    Encounter for screening mammogram for malignant neoplasm of breast        Relevant Orders    BI mammo bilateral screening tomosynthesis    Wellness examination        Relevant Orders    CBC    Comprehensive Metabolic Panel    Lipid Panel    Vitamin B12    POCT UA (nonautomated) manually resulted (Completed)    Weight loss        Relevant Orders    TSH with reflex to Free T4 if abnormal    Family hx of ovarian malignancy        Relevant Orders        Rib pain on left side        Relevant Orders    XR ribs 2 views left w chest pa or ap        Check xray  Check labs, mamm, and bone density  Call  with any problems or questions.   Follow up in 6 months.   Flu shot given

## 2024-10-11 ENCOUNTER — HOSPITAL ENCOUNTER (OUTPATIENT)
Dept: RADIOLOGY | Facility: HOSPITAL | Age: 64
Discharge: HOME | End: 2024-10-11
Payer: COMMERCIAL

## 2024-10-11 ENCOUNTER — LAB (OUTPATIENT)
Dept: LAB | Facility: LAB | Age: 64
End: 2024-10-11
Payer: COMMERCIAL

## 2024-10-11 DIAGNOSIS — R63.4 WEIGHT LOSS: ICD-10-CM

## 2024-10-11 DIAGNOSIS — Z00.00 WELLNESS EXAMINATION: ICD-10-CM

## 2024-10-11 DIAGNOSIS — Z80.41 FAMILY HX OF OVARIAN MALIGNANCY: ICD-10-CM

## 2024-10-11 DIAGNOSIS — M81.0 POSTMENOPAUSAL OSTEOPOROSIS: ICD-10-CM

## 2024-10-11 DIAGNOSIS — R07.81 RIB PAIN ON LEFT SIDE: ICD-10-CM

## 2024-10-11 DIAGNOSIS — E55.9 VITAMIN D DEFICIENCY: ICD-10-CM

## 2024-10-11 LAB
25(OH)D3 SERPL-MCNC: 51 NG/ML (ref 30–100)
ALBUMIN SERPL BCP-MCNC: 4.2 G/DL (ref 3.4–5)
ALP SERPL-CCNC: 39 U/L (ref 33–136)
ALT SERPL W P-5'-P-CCNC: 13 U/L (ref 7–45)
ANION GAP SERPL CALC-SCNC: 10 MMOL/L (ref 10–20)
AST SERPL W P-5'-P-CCNC: 20 U/L (ref 9–39)
BILIRUB SERPL-MCNC: 0.8 MG/DL (ref 0–1.2)
BUN SERPL-MCNC: 11 MG/DL (ref 6–23)
CALCIUM SERPL-MCNC: 9.3 MG/DL (ref 8.6–10.3)
CANCER AG125 SERPL-ACNC: 6.1 U/ML (ref 0–30.2)
CHLORIDE SERPL-SCNC: 102 MMOL/L (ref 98–107)
CHOLEST SERPL-MCNC: 220 MG/DL (ref 0–199)
CHOLESTEROL/HDL RATIO: 2.9
CO2 SERPL-SCNC: 31 MMOL/L (ref 21–32)
CREAT SERPL-MCNC: 0.82 MG/DL (ref 0.5–1.05)
EGFRCR SERPLBLD CKD-EPI 2021: 81 ML/MIN/1.73M*2
ERYTHROCYTE [DISTWIDTH] IN BLOOD BY AUTOMATED COUNT: 13.5 % (ref 11.5–14.5)
GLUCOSE SERPL-MCNC: 94 MG/DL (ref 74–99)
HCT VFR BLD AUTO: 42.1 % (ref 36–46)
HDLC SERPL-MCNC: 74.9 MG/DL
HGB BLD-MCNC: 13.3 G/DL (ref 12–16)
LDLC SERPL CALC-MCNC: 137 MG/DL
MCH RBC QN AUTO: 30.2 PG (ref 26–34)
MCHC RBC AUTO-ENTMCNC: 31.6 G/DL (ref 32–36)
MCV RBC AUTO: 96 FL (ref 80–100)
NON HDL CHOLESTEROL: 145 MG/DL (ref 0–149)
NRBC BLD-RTO: 0 /100 WBCS (ref 0–0)
PLATELET # BLD AUTO: 258 X10*3/UL (ref 150–450)
POTASSIUM SERPL-SCNC: 4.4 MMOL/L (ref 3.5–5.3)
PROT SERPL-MCNC: 6.6 G/DL (ref 6.4–8.2)
RBC # BLD AUTO: 4.4 X10*6/UL (ref 4–5.2)
SODIUM SERPL-SCNC: 139 MMOL/L (ref 136–145)
TRIGL SERPL-MCNC: 42 MG/DL (ref 0–149)
TSH SERPL-ACNC: 2.12 MIU/L (ref 0.44–3.98)
VIT B12 SERPL-MCNC: 499 PG/ML (ref 211–911)
VLDL: 8 MG/DL (ref 0–40)
WBC # BLD AUTO: 5.9 X10*3/UL (ref 4.4–11.3)

## 2024-10-11 PROCEDURE — 71101 X-RAY EXAM UNILAT RIBS/CHEST: CPT | Mod: LT

## 2024-10-11 PROCEDURE — 72072 X-RAY EXAM THORAC SPINE 3VWS: CPT

## 2024-10-11 PROCEDURE — 85027 COMPLETE CBC AUTOMATED: CPT

## 2024-10-11 PROCEDURE — 84443 ASSAY THYROID STIM HORMONE: CPT

## 2024-10-11 PROCEDURE — 82306 VITAMIN D 25 HYDROXY: CPT

## 2024-10-11 PROCEDURE — 82607 VITAMIN B-12: CPT

## 2024-10-11 PROCEDURE — 80061 LIPID PANEL: CPT

## 2024-10-11 PROCEDURE — 36415 COLL VENOUS BLD VENIPUNCTURE: CPT

## 2024-10-11 PROCEDURE — 80053 COMPREHEN METABOLIC PANEL: CPT

## 2024-10-11 PROCEDURE — 86304 IMMUNOASSAY TUMOR CA 125: CPT

## 2024-11-07 ENCOUNTER — APPOINTMENT (OUTPATIENT)
Dept: UROLOGY | Facility: CLINIC | Age: 64
End: 2024-11-07
Payer: COMMERCIAL

## 2024-11-07 VITALS
HEART RATE: 70 BPM | DIASTOLIC BLOOD PRESSURE: 71 MMHG | BODY MASS INDEX: 21.07 KG/M2 | TEMPERATURE: 98.4 F | SYSTOLIC BLOOD PRESSURE: 109 MMHG | WEIGHT: 115.2 LBS

## 2024-11-07 DIAGNOSIS — R33.9 RETENTION OF URINE: ICD-10-CM

## 2024-11-07 DIAGNOSIS — N13.39 OTHER HYDRONEPHROSIS: ICD-10-CM

## 2024-11-07 LAB
POC APPEARANCE, URINE: CLEAR
POC BILIRUBIN, URINE: NEGATIVE
POC BLOOD, URINE: NEGATIVE
POC COLOR, URINE: YELLOW
POC GLUCOSE, URINE: NEGATIVE MG/DL
POC KETONES, URINE: NEGATIVE MG/DL
POC LEUKOCYTES, URINE: NEGATIVE
POC NITRITE,URINE: NEGATIVE
POC PH, URINE: 7 PH
POC PROTEIN, URINE: NEGATIVE MG/DL
POC SPECIFIC GRAVITY, URINE: 1.01
POC UROBILINOGEN, URINE: 0.2 EU/DL

## 2024-11-07 PROCEDURE — 3078F DIAST BP <80 MM HG: CPT | Performed by: UROLOGY

## 2024-11-07 PROCEDURE — 3074F SYST BP LT 130 MM HG: CPT | Performed by: UROLOGY

## 2024-11-07 PROCEDURE — 51798 US URINE CAPACITY MEASURE: CPT | Performed by: UROLOGY

## 2024-11-07 PROCEDURE — 81003 URINALYSIS AUTO W/O SCOPE: CPT | Performed by: UROLOGY

## 2024-11-07 PROCEDURE — 99213 OFFICE O/P EST LOW 20 MIN: CPT | Performed by: UROLOGY

## 2024-11-07 NOTE — PROGRESS NOTES
Subjective   Patient ID: Jesica Brown is a 63 y.o. female who presents for Follow-up. Last seen 8/6/24 when We reviewed the Lasix scan results together. Even though her right kidney is draining slower than the left, it is draining WNL.   UA is clear. PVR today is 115 mL. Recommend she increase tamsulosin to 0.4 mg bid. Refill prescription for tamsulosin. Follow-up in 3 months for repeat PVR.     HPI  PVR today is 15 mL.   The patient has discontinued taking tamsulosin a couple of weeks ago as her BP was very low (90/50 even 80/45). It took a couple of weeks for her BP to normalize.       Review of Systems  A 12 system review was completed and is negative with the exception of those signs and symptoms noted in the history of present illness.    Objective   Physical Exam  General: in NAD, appears stated age  Head: normocephalic, atraumatic  Respiratory: normal effort, no use of accessory muscles  Cardiovascular: no edema noted  Skin: normal turgor, no rashes  Neurologic: grossly intact, oriented to person/place/time  Psychiatric: mode and affect appropriate     Assessment/Plan   Problem List Items Addressed This Visit             ICD-10-CM    Hydronephrosis N13.30    Relevant Orders    Post-Void Residual (Completed)    POCT UA Automated manually resulted (Completed)     Other Visit Diagnoses         Codes    Retention of urine     R33.9    Relevant Orders    Post-Void Residual (Completed)    POCT UA Automated manually resulted (Completed)          The patient is doing very well without medication. PVR is good and symptoms have resolved. Follow-up prn.       Scribe Attestation  By signing my name below, I, Vanesa Garay   attest that this documentation has been prepared under the direction and in the presence of Jose Martin Lawrence MD.

## 2024-11-26 ENCOUNTER — TELEPHONE (OUTPATIENT)
Dept: PULMONOLOGY | Age: 64
End: 2024-11-26

## 2024-11-26 ENCOUNTER — APPOINTMENT (OUTPATIENT)
Dept: RADIOLOGY | Facility: HOSPITAL | Age: 64
End: 2024-11-26
Payer: COMMERCIAL

## 2024-11-26 ENCOUNTER — HOSPITAL ENCOUNTER (OUTPATIENT)
Dept: RADIOLOGY | Facility: HOSPITAL | Age: 64
Discharge: HOME | End: 2024-11-26
Payer: COMMERCIAL

## 2024-11-26 VITALS — BODY MASS INDEX: 20.77 KG/M2 | HEIGHT: 61 IN | WEIGHT: 110 LBS

## 2024-11-26 DIAGNOSIS — Z12.31 ENCOUNTER FOR SCREENING MAMMOGRAM FOR MALIGNANT NEOPLASM OF BREAST: ICD-10-CM

## 2024-11-26 DIAGNOSIS — M81.0 POSTMENOPAUSAL OSTEOPOROSIS: ICD-10-CM

## 2024-11-26 DIAGNOSIS — J44.9 CHRONIC OBSTRUCTIVE PULMONARY DISEASE, UNSPECIFIED COPD TYPE (HCC): Primary | ICD-10-CM

## 2024-11-26 PROCEDURE — 77080 DXA BONE DENSITY AXIAL: CPT

## 2024-11-26 PROCEDURE — 77067 SCR MAMMO BI INCL CAD: CPT

## 2024-11-26 PROCEDURE — 77080 DXA BONE DENSITY AXIAL: CPT | Performed by: STUDENT IN AN ORGANIZED HEALTH CARE EDUCATION/TRAINING PROGRAM

## 2024-11-26 PROCEDURE — 77063 BREAST TOMOSYNTHESIS BI: CPT | Performed by: RADIOLOGY

## 2024-11-26 PROCEDURE — 77067 SCR MAMMO BI INCL CAD: CPT | Performed by: RADIOLOGY

## 2024-12-02 ENCOUNTER — OFFICE VISIT (OUTPATIENT)
Dept: PULMONOLOGY | Age: 64
End: 2024-12-02
Payer: COMMERCIAL

## 2024-12-02 VITALS
HEART RATE: 74 BPM | DIASTOLIC BLOOD PRESSURE: 68 MMHG | SYSTOLIC BLOOD PRESSURE: 104 MMHG | BODY MASS INDEX: 21.03 KG/M2 | OXYGEN SATURATION: 98 % | WEIGHT: 115 LBS

## 2024-12-02 DIAGNOSIS — J44.9 CHRONIC OBSTRUCTIVE PULMONARY DISEASE, UNSPECIFIED COPD TYPE (HCC): Primary | ICD-10-CM

## 2024-12-02 DIAGNOSIS — K21.9 GASTROESOPHAGEAL REFLUX DISEASE WITHOUT ESOPHAGITIS: ICD-10-CM

## 2024-12-02 DIAGNOSIS — J45.40 MODERATE PERSISTENT ASTHMA WITHOUT COMPLICATION: ICD-10-CM

## 2024-12-02 DIAGNOSIS — R06.02 SHORTNESS OF BREATH: ICD-10-CM

## 2024-12-02 PROCEDURE — 99214 OFFICE O/P EST MOD 30 MIN: CPT | Performed by: INTERNAL MEDICINE

## 2024-12-02 PROCEDURE — 3078F DIAST BP <80 MM HG: CPT | Performed by: INTERNAL MEDICINE

## 2024-12-02 PROCEDURE — 3074F SYST BP LT 130 MM HG: CPT | Performed by: INTERNAL MEDICINE

## 2024-12-02 ASSESSMENT — ENCOUNTER SYMPTOMS
COUGH: 0
NAUSEA: 0
SINUS PRESSURE: 0
EYE DISCHARGE: 0
SORE THROAT: 0
WHEEZING: 0
DIARRHEA: 0
VOMITING: 0
CHEST TIGHTNESS: 0
VOICE CHANGE: 0
RHINORRHEA: 0
EYE ITCHING: 0
SHORTNESS OF BREATH: 0
ABDOMINAL PAIN: 0
TROUBLE SWALLOWING: 0

## 2024-12-02 NOTE — PROGRESS NOTES
There is no abdominal tenderness. There is no rebound.   Musculoskeletal:         General: Normal range of motion.   Lymphadenopathy:      Cervical: No cervical adenopathy.   Skin:     General: Skin is warm and dry.   Neurological:      Mental Status: She is alert and oriented to person, place, and time.      Coordination: Coordination normal.   Psychiatric:         Mood and Affect: Mood normal.         Behavior: Behavior normal.         Current Outpatient Medications   Medication Sig Dispense Refill    Budeson-Glycopyrrol-Formoterol 160-9-4.8 MCG/ACT AERO Inhale 2 puffs into the lungs in the morning and at bedtime 2 puff twice a day. 3 each 1    albuterol sulfate HFA (VENTOLIN HFA) 108 (90 Base) MCG/ACT inhaler Inhale 2 puffs into the lungs 4 times daily as needed for Wheezing 18 g 0    alendronate (FOSAMAX) 70 MG tablet TAKE 1 TABLET BY MOUTH ONCE A WEEK. take IN THE MORNING ON AN EMPTY STOMACH with a full glass OF water. do not take anything else BY MOUTH or lie down for the next 30 MINUTES      telmisartan (MICARDIS) 40 MG tablet Take 1 tablet by mouth daily 30 tablet 0     No current facility-administered medications for this visit.       Results for orders placed during the hospital encounter of 01/16/24    XR CHEST (2 VW)    Narrative  EXAMINATION:  TWO XRAY VIEWS OF THE CHEST    1/16/2024 10:54 am    COMPARISON:  12/29/2022    HISTORY:  ORDERING SYSTEM PROVIDED HISTORY: Chronic obstructive pulmonary disease,  unspecified COPD type ([facility])  TECHNOLOGIST PROVIDED HISTORY:  What reading provider will be dictating this exam?->CRC    FINDINGS:  Degenerative changes thoracic spine with scoliosis.    The lungs are without acute focal process.  There is no effusion or  pneumothorax. The cardiomediastinal silhouette is without acute process. The  osseous structures are without acute process.    Impression  No acute process.      Results for orders placed during the hospital encounter of 12/29/22    XR CHEST (2

## 2024-12-06 DIAGNOSIS — F51.01 PRIMARY INSOMNIA: ICD-10-CM

## 2024-12-06 RX ORDER — TRAZODONE HYDROCHLORIDE 50 MG/1
TABLET ORAL
Qty: 90 TABLET | Refills: 3 | Status: SHIPPED | OUTPATIENT
Start: 2024-12-06

## 2024-12-09 NOTE — TELEPHONE ENCOUNTER
PATIENT HAS AN APPT WITH YOU ON 12/2/24. SHE STATES THAT SHE GETS A BREATHING TEST EVERY YEAR. SHE IS ASKING IF YOU CAN PUT AN ORDER IN FOR A PFT BEFORE HER APPT. PLEASE ADVISE.   
Request for PFT done.  
No

## 2024-12-13 DIAGNOSIS — I10 HYPERTENSION, UNSPECIFIED TYPE: ICD-10-CM

## 2024-12-13 RX ORDER — TELMISARTAN 40 MG/1
40 TABLET ORAL DAILY
Qty: 90 TABLET | Refills: 3 | Status: SHIPPED | OUTPATIENT
Start: 2024-12-13

## 2024-12-16 ENCOUNTER — HOSPITAL ENCOUNTER (OUTPATIENT)
Dept: PULMONOLOGY | Age: 64
Discharge: HOME | End: 2024-12-16
Attending: INTERNAL MEDICINE
Payer: COMMERCIAL

## 2024-12-16 ENCOUNTER — HOSPITAL ENCOUNTER (OUTPATIENT)
Dept: GENERAL RADIOLOGY | Age: 64
Discharge: HOME | End: 2024-12-18
Attending: INTERNAL MEDICINE
Payer: COMMERCIAL

## 2024-12-16 DIAGNOSIS — J45.40 MODERATE PERSISTENT ASTHMA WITHOUT COMPLICATION: ICD-10-CM

## 2024-12-16 PROCEDURE — 94726 PLETHYSMOGRAPHY LUNG VOLUMES: CPT

## 2024-12-16 PROCEDURE — 71046 X-RAY EXAM CHEST 2 VIEWS: CPT

## 2024-12-16 PROCEDURE — 94060 EVALUATION OF WHEEZING: CPT

## 2024-12-16 PROCEDURE — 94729 DIFFUSING CAPACITY: CPT

## 2024-12-16 PROCEDURE — 6360000002 HC RX W HCPCS

## 2024-12-16 RX ORDER — ALBUTEROL SULFATE 0.83 MG/ML
SOLUTION RESPIRATORY (INHALATION)
Status: COMPLETED
Start: 2024-12-16 | End: 2024-12-16

## 2024-12-16 RX ADMIN — ALBUTEROL SULFATE 2.5 MG: 2.5 SOLUTION RESPIRATORY (INHALATION) at 10:50

## 2024-12-18 NOTE — PROCEDURES
[place] [id number]                    PULMONARY FUNCTION  Jenny Pinto   64 y.o.   female     Test interpretation     Spirometry is normal with no significant response to bronchodilator  Lung volumes are normal     Diffusion capacity is normal       Clinical correlation is recommended     Macario Skelton MD [facility], 12/18/2024 10:48 AM

## 2025-01-13 ENCOUNTER — OFFICE VISIT (OUTPATIENT)
Dept: PULMONOLOGY | Age: 65
End: 2025-01-13
Payer: COMMERCIAL

## 2025-01-13 VITALS
HEART RATE: 63 BPM | OXYGEN SATURATION: 99 % | WEIGHT: 115 LBS | DIASTOLIC BLOOD PRESSURE: 76 MMHG | SYSTOLIC BLOOD PRESSURE: 102 MMHG | BODY MASS INDEX: 21.03 KG/M2

## 2025-01-13 DIAGNOSIS — J30.1 ALLERGIC RHINITIS DUE TO POLLEN, UNSPECIFIED SEASONALITY: ICD-10-CM

## 2025-01-13 DIAGNOSIS — J45.20 MILD INTERMITTENT ASTHMA WITHOUT COMPLICATION: ICD-10-CM

## 2025-01-13 DIAGNOSIS — R06.02 SHORTNESS OF BREATH: ICD-10-CM

## 2025-01-13 DIAGNOSIS — K21.9 GASTROESOPHAGEAL REFLUX DISEASE WITHOUT ESOPHAGITIS: ICD-10-CM

## 2025-01-13 DIAGNOSIS — J44.9 CHRONIC OBSTRUCTIVE PULMONARY DISEASE, UNSPECIFIED COPD TYPE (HCC): Primary | ICD-10-CM

## 2025-01-13 PROCEDURE — 99214 OFFICE O/P EST MOD 30 MIN: CPT | Performed by: INTERNAL MEDICINE

## 2025-01-13 PROCEDURE — 3078F DIAST BP <80 MM HG: CPT | Performed by: INTERNAL MEDICINE

## 2025-01-13 PROCEDURE — 3074F SYST BP LT 130 MM HG: CPT | Performed by: INTERNAL MEDICINE

## 2025-01-13 RX ORDER — ALBUTEROL SULFATE 90 UG/1
2 INHALANT RESPIRATORY (INHALATION) 4 TIMES DAILY PRN
Qty: 18 G | Refills: 0 | Status: SHIPPED | OUTPATIENT
Start: 2025-01-13

## 2025-01-13 ASSESSMENT — ENCOUNTER SYMPTOMS
VOMITING: 0
VOICE CHANGE: 0
WHEEZING: 0
SHORTNESS OF BREATH: 1
RHINORRHEA: 0
CHEST TIGHTNESS: 0
SORE THROAT: 0
NAUSEA: 0
COUGH: 0
EYE DISCHARGE: 0
TROUBLE SWALLOWING: 0
ABDOMINAL PAIN: 0
DIARRHEA: 0
SINUS PRESSURE: 0
EYE ITCHING: 0

## 2025-01-13 NOTE — PROGRESS NOTES
General: There is no distension.      Tenderness: There is no abdominal tenderness. There is no rebound.   Musculoskeletal:         General: Normal range of motion.   Lymphadenopathy:      Cervical: No cervical adenopathy.   Skin:     General: Skin is warm and dry.   Neurological:      Mental Status: She is alert and oriented to person, place, and time.      Coordination: Coordination normal.   Psychiatric:         Mood and Affect: Mood normal.         Behavior: Behavior normal.         Current Outpatient Medications   Medication Sig Dispense Refill    albuterol sulfate HFA (VENTOLIN HFA) 108 (90 Base) MCG/ACT inhaler Inhale 2 puffs into the lungs 4 times daily as needed for Wheezing 18 g 0    Budeson-Glycopyrrol-Formoterol 160-9-4.8 MCG/ACT AERO Inhale 2 puffs into the lungs in the morning and at bedtime 2 puff twice a day. 3 each 1    alendronate (FOSAMAX) 70 MG tablet TAKE 1 TABLET BY MOUTH ONCE A WEEK. take IN THE MORNING ON AN EMPTY STOMACH with a full glass OF water. do not take anything else BY MOUTH or lie down for the next 30 MINUTES      telmisartan (MICARDIS) 40 MG tablet Take 1 tablet by mouth daily 30 tablet 0     No current facility-administered medications for this visit.       Results for orders placed during the hospital encounter of 12/16/24    XR CHEST (2 VW)    Narrative  EXAMINATION:  TWO XRAY VIEWS OF THE CHEST    12/16/2024 10:44 am    COMPARISON:  January 6, 2024 1100 hours    HISTORY:  ORDERING SYSTEM PROVIDED HISTORY: Moderate persistent asthma without  complication  TECHNOLOGIST PROVIDED HISTORY:  Reason for exam:->asthma  What reading provider will be dictating this exam?->CRC    FINDINGS:  Two views are submitted.    The cardiac silhouette is normal size configuration.    Pulmonary vascular attenuated.    Lung fields are hyperinflated.  There is widening of the AP diameter chest.  Radiographic findings suggestive of COPD.    Right-sided trachea.    No focal infiltrate    No effusion

## 2025-05-13 ENCOUNTER — OFFICE VISIT (OUTPATIENT)
Age: 65
End: 2025-05-13
Payer: COMMERCIAL

## 2025-05-13 VITALS
HEART RATE: 72 BPM | WEIGHT: 117 LBS | SYSTOLIC BLOOD PRESSURE: 110 MMHG | OXYGEN SATURATION: 97 % | DIASTOLIC BLOOD PRESSURE: 72 MMHG | BODY MASS INDEX: 21.39 KG/M2

## 2025-05-13 DIAGNOSIS — J30.1 ALLERGIC RHINITIS DUE TO POLLEN, UNSPECIFIED SEASONALITY: ICD-10-CM

## 2025-05-13 DIAGNOSIS — R06.02 SHORTNESS OF BREATH: ICD-10-CM

## 2025-05-13 DIAGNOSIS — K21.9 GASTROESOPHAGEAL REFLUX DISEASE WITHOUT ESOPHAGITIS: ICD-10-CM

## 2025-05-13 DIAGNOSIS — J44.9 CHRONIC OBSTRUCTIVE PULMONARY DISEASE, UNSPECIFIED COPD TYPE (HCC): Primary | ICD-10-CM

## 2025-05-13 PROCEDURE — 3078F DIAST BP <80 MM HG: CPT | Performed by: INTERNAL MEDICINE

## 2025-05-13 PROCEDURE — 99214 OFFICE O/P EST MOD 30 MIN: CPT | Performed by: INTERNAL MEDICINE

## 2025-05-13 PROCEDURE — 3074F SYST BP LT 130 MM HG: CPT | Performed by: INTERNAL MEDICINE

## 2025-05-13 NOTE — PROGRESS NOTES
Subjective:             Jenny Pinto is a 64 y.o. female who complains today of:     Chief Complaint   Patient presents with    Follow-up     4m f/u on COPD       HPI  She had CXR and PFT      Radiographic findings suggestive of COPD.  .No acute active cardiopulmonary process  PFT done Spirometry is normal with no significant response to bronchodilator  Lung volumes mild hyperinflation . Diffusion capacity is normal.      She uses an albuterol inhaler as needed, but requiring more  due to wheezing.  Which was only required once during the summer while hiking in humid weather.     C/o shortness of breath with exertion, not all the time . Occasional Wheezing.   No Hemoptysis.No Chest tightness. No Chest pain with radiation  or pleuritic pain.  No  leg edema. No orthopnea.No Fever or chills. No Rhinorrhea and postnasal drip.    IMPRESSION:   Radiographic findings suggestive of COPD.  .   No acute active cardiopulmonary process    24  PFT Spirometry is normal with no significant response to bronchodilator  Lung volumes are normal. Diffusion capacity is normal     She consulted with Dr. Robles, an allergist, who diagnosed her with lactose intolerance rather than a milk protein allergy. She was advised to continue using Flonase and was informed that biological therapy or immunotherapy was not necessary at this time.    Allergies:  Lisinopril, Molds & smuts, Milk-related compounds, Pollen extract, and Penicillins  Past Medical History:   Diagnosis Date    Arthritis of both knees     Arthritis of right hand     Fibroadenoma of breast, right      Past Surgical History:   Procedure Laterality Date    BREAST ENHANCEMENT SURGERY      BREAST FIBROADENOMA SURGERY       SECTION      PARTIAL HYSTERECTOMY (CERVIX NOT REMOVED)      TONSILLECTOMY      WISDOM TOOTH EXTRACTION       Family History   Problem Relation Age of Onset    Cancer Mother         lung    High Blood Pressure Mother     High Blood

## 2025-05-15 ENCOUNTER — TELEPHONE (OUTPATIENT)
Age: 65
End: 2025-05-15

## 2025-05-15 DIAGNOSIS — J44.9 CHRONIC OBSTRUCTIVE PULMONARY DISEASE, UNSPECIFIED COPD TYPE (HCC): Primary | ICD-10-CM

## 2025-05-15 RX ORDER — ALBUTEROL SULFATE 90 UG/1
2 INHALANT RESPIRATORY (INHALATION) 4 TIMES DAILY PRN
Qty: 18 G | Refills: 0 | Status: SHIPPED | OUTPATIENT
Start: 2025-05-15

## 2025-05-15 NOTE — TELEPHONE ENCOUNTER
Pt states bevespi is not covered and states her other drs call insurance company and find out what is covered. She would like you to do that because she doesn't know what is covered.      Please advise

## 2025-05-15 NOTE — TELEPHONE ENCOUNTER
Rx requested:  Requested Prescriptions     Pending Prescriptions Disp Refills    albuterol sulfate HFA (VENTOLIN HFA) 108 (90 Base) MCG/ACT inhaler 18 g 0     Sig: Inhale 2 puffs into the lungs 4 times daily as needed for Wheezing       Last Office Visit:   5/13/2025      Next Visit Date:  Future Appointments   Date Time Provider Department Center   11/13/2025  9:30 AM Deejay Dunne MD [facility]

## 2025-05-19 ASSESSMENT — ENCOUNTER SYMPTOMS
RHINORRHEA: 0
TROUBLE SWALLOWING: 0
SORE THROAT: 0
DIARRHEA: 0
WHEEZING: 0
EYE ITCHING: 0
VOMITING: 0
EYE DISCHARGE: 0
SINUS PRESSURE: 0
CHEST TIGHTNESS: 0
COUGH: 0
ABDOMINAL PAIN: 0
NAUSEA: 0
VOICE CHANGE: 0

## 2025-05-22 NOTE — TELEPHONE ENCOUNTER
BEVESPI IS DENIED.           PER INSURANCE Anoro Ellipta AND Stiolto Respimat ARE COVERED.        PLEASE ADVISE

## 2025-05-23 RX ORDER — ALBUTEROL SULFATE 90 UG/1
2 INHALANT RESPIRATORY (INHALATION) 4 TIMES DAILY PRN
Qty: 18 G | Refills: 3 | Status: SHIPPED | OUTPATIENT
Start: 2025-05-23

## 2025-05-23 NOTE — TELEPHONE ENCOUNTER
Rx was sent with no refills.        Rx requested:  Requested Prescriptions     Pending Prescriptions Disp Refills    albuterol sulfate HFA (VENTOLIN HFA) 108 (90 Base) MCG/ACT inhaler 18 g 3     Sig: Inhale 2 puffs into the lungs 4 times daily as needed for Wheezing       Last Office Visit:   5/13/2025      Next Visit Date:  Future Appointments   Date Time Provider Department Center   11/13/2025  9:30 AM Deejay Dunne MD [facility]

## 2025-08-07 ENCOUNTER — TELEPHONE (OUTPATIENT)
Dept: PRIMARY CARE | Facility: CLINIC | Age: 65
End: 2025-08-07
Payer: COMMERCIAL

## 2025-08-08 NOTE — TELEPHONE ENCOUNTER
passed away and left behind debt    She is trying to get tax relief and IRS requested a letter about her health.      Letter needs to state that she has copd/asthma, osteoporosis, osteoarthritis.

## 2025-08-30 DIAGNOSIS — M81.0 POSTMENOPAUSAL OSTEOPOROSIS: ICD-10-CM

## 2025-09-02 RX ORDER — ALENDRONATE SODIUM 70 MG/1
TABLET ORAL
Qty: 12 TABLET | Refills: 3 | Status: SHIPPED | OUTPATIENT
Start: 2025-09-02

## 2025-10-14 ENCOUNTER — APPOINTMENT (OUTPATIENT)
Dept: PRIMARY CARE | Facility: CLINIC | Age: 65
End: 2025-10-14
Payer: COMMERCIAL

## (undated) DEVICE — CATHETER, URETERAL, POLLACK, OPEN END, 5.5 FR, 70 CM

## (undated) DEVICE — SUTURE, SILK, 0, 30 IN, FSL, BLACK

## (undated) DEVICE — SCISSORS, METZ, CURVED, 3/4 BLADE

## (undated) DEVICE — SEAL, UNIVERSAL 5-8MM  XI

## (undated) DEVICE — Device

## (undated) DEVICE — SUTURE, V-LOC 3-0 CV-23 6 GR 180 ABS"

## (undated) DEVICE — DRAPE, COLUMN, DAVINCI XI

## (undated) DEVICE — TUBING SET, TRI-LUMEN, FILTERED, F/AIRSEAL

## (undated) DEVICE — COVER, TIP HOT SHEARS ENDOWRIST

## (undated) DEVICE — NEEDLE, INSUFFLATION, 13GAX120MM, DISP

## (undated) DEVICE — TISSUE ADHESIVE, PREMIERPRO EXOFIN, PRECISION PEN HV, 1.0ML

## (undated) DEVICE — GLOVE, SURGICAL, PROTEXIS PI MICRO, 7.5, PF, LF

## (undated) DEVICE — SOLUTION, IRRIGATION, SODIUM CHLORIDE 0.9%, 1000 ML, POUR BOTTLE

## (undated) DEVICE — TISSEEL FIBRIN SEALANT, PRIMA, FROZEN, 4ML

## (undated) DEVICE — GLOVE, SURGICAL, PROTEXIS PI W/NEU-THERA, 7.5, PF, LF

## (undated) DEVICE — KIT, APPLICATOR, DUPLOSPRAY, 30CM

## (undated) DEVICE — GOWN, ASTOUND, XL

## (undated) DEVICE — ASSEMBLY, STRYKER FLOW 2, SUCTION IRRIGATOR, WITH TIP

## (undated) DEVICE — DRAPE, SHEET, UTILITY, NON ABSORBENT, 18 X 26 IN, LF

## (undated) DEVICE — ACCESS PORT, 12MM, 100MM LENGTH, LOW PROFILE W/BLADELESS OPTICAL TIP

## (undated) DEVICE — CARE KIT, LAPAROSCOPIC, ADVANCED

## (undated) DEVICE — SYRINGE, 60 CC, IRRIGATION, PISTON, CATH TIP, W/LUER ADAPTER,DISP

## (undated) DEVICE — DRAPE PACK, LAVH, W/ATTACHED LEGGINGS, W/POUCH, 100 X 114 IN, LF, STERILE

## (undated) DEVICE — TRAY, SURESTEP, SILICONE DRAINAGE BAG, STATLOCK, 16FR

## (undated) DEVICE — TOWEL PACK, STERILE, 4/PACK, BLUE

## (undated) DEVICE — DRAIN, JP CHANNEL, 15 FR, RND, W/TROCAR

## (undated) DEVICE — OBTURATOR, BLADELESS , SU

## (undated) DEVICE — GLOVE, SURGICAL, PROTEXIS PI ORTHO, 7.0, PF, LF

## (undated) DEVICE — DRAPE, ARM XI

## (undated) DEVICE — EVACUATOR, WOUND, SUCTION, CLOSED, JACKSON-PRATT, 100 CC, SILICONE

## (undated) DEVICE — GUIDEWIRE, STRAIGHT, ZIPWIRE, .035 X 150CM, STANDARD

## (undated) DEVICE — CATHETER, URETERAL, OLIVE TIP, 5 FR, 115 CM, POLYURETHANE

## (undated) DEVICE — APPLICATOR, CHLORAPREP, W/ORANGE TINT, 26ML

## (undated) DEVICE — SYRINGE, HYPODERMIC, CONTROL, LUER LOCK, 10 CC, PLASTIC, STERILE

## (undated) DEVICE — SUTURE, VICRYL, 3-0, 27IN, RB-1

## (undated) DEVICE — GUIDEWIRE, DUAL SENSOR, .035 X 150 STRAIGHT,  3CM

## (undated) DEVICE — NEEDLE, HYPODERMIC, SPECIALTY, REGULAR WALL, SHORT BEVEL, 18 G X 1.5 IN

## (undated) DEVICE — TROCAR, KII OPTICAL BLADELESS 5MM Z THREAD 100MM LNGTH

## (undated) DEVICE — SOLUTION, IRRIGATION, STERILE WATER, 1000 ML, POUR BOTTLE

## (undated) DEVICE — TRAY, PARA/PUD BLOCK, W/10ML SYRINGE

## (undated) DEVICE — CATHETER, FOLEY, ELASTOMER, 2 WAY, 16 FR 5 CC, SILICONE

## (undated) DEVICE — SUTURE, VICRYL, 4-0, 27 IN, PS-2, UNDYED